# Patient Record
Sex: FEMALE | Race: WHITE | NOT HISPANIC OR LATINO | Employment: OTHER | URBAN - NONMETROPOLITAN AREA
[De-identification: names, ages, dates, MRNs, and addresses within clinical notes are randomized per-mention and may not be internally consistent; named-entity substitution may affect disease eponyms.]

---

## 2018-06-10 ENCOUNTER — APPOINTMENT (OUTPATIENT)
Dept: CT IMAGING | Facility: HOSPITAL | Age: 68
End: 2018-06-10

## 2018-06-10 ENCOUNTER — APPOINTMENT (OUTPATIENT)
Dept: GENERAL RADIOLOGY | Facility: HOSPITAL | Age: 68
End: 2018-06-10

## 2018-06-10 ENCOUNTER — HOSPITAL ENCOUNTER (EMERGENCY)
Facility: HOSPITAL | Age: 68
Discharge: ANOTHER HOSPITAL | End: 2018-06-10
Attending: EMERGENCY MEDICINE | Admitting: EMERGENCY MEDICINE

## 2018-06-10 VITALS
DIASTOLIC BLOOD PRESSURE: 58 MMHG | HEART RATE: 99 BPM | RESPIRATION RATE: 18 BRPM | WEIGHT: 110 LBS | OXYGEN SATURATION: 97 % | SYSTOLIC BLOOD PRESSURE: 111 MMHG | HEIGHT: 60 IN | TEMPERATURE: 97.6 F | BODY MASS INDEX: 21.6 KG/M2

## 2018-06-10 DIAGNOSIS — A41.9 SEPSIS, DUE TO UNSPECIFIED ORGANISM: ICD-10-CM

## 2018-06-10 DIAGNOSIS — T81.43XA POSTPROCEDURAL INTRAABDOMINAL ABSCESS: Primary | ICD-10-CM

## 2018-06-10 DIAGNOSIS — IMO0001 POSTOPERATIVE WOUND ABSCESS, INITIAL ENCOUNTER: ICD-10-CM

## 2018-06-10 DIAGNOSIS — D64.9 ANEMIA, UNSPECIFIED TYPE: ICD-10-CM

## 2018-06-10 LAB
ABO GROUP BLD: NORMAL
ABO GROUP BLD: NORMAL
ALBUMIN SERPL-MCNC: 3.9 G/DL (ref 3.5–5)
ALBUMIN/GLOB SERPL: 0.9 G/DL (ref 1–2)
ALP SERPL-CCNC: 772 U/L (ref 38–126)
ALT SERPL W P-5'-P-CCNC: 49 U/L (ref 13–69)
ANION GAP SERPL CALCULATED.3IONS-SCNC: 15.2 MMOL/L (ref 10–20)
AST SERPL-CCNC: 63 U/L (ref 15–46)
BASOPHILS # BLD AUTO: 0.05 10*3/MM3 (ref 0–0.2)
BASOPHILS NFR BLD AUTO: 0.3 % (ref 0–2.5)
BILIRUB SERPL-MCNC: 0.6 MG/DL (ref 0.2–1.3)
BLD GP AB SCN SERPL QL: NEGATIVE
BUN BLD-MCNC: 18 MG/DL (ref 7–20)
BUN/CREAT SERPL: 36 (ref 7.1–23.5)
CALCIUM SPEC-SCNC: 10 MG/DL (ref 8.4–10.2)
CHLORIDE SERPL-SCNC: 100 MMOL/L (ref 98–107)
CO2 SERPL-SCNC: 24 MMOL/L (ref 26–30)
CREAT BLD-MCNC: 0.5 MG/DL (ref 0.6–1.3)
D-LACTATE SERPL-SCNC: 1 MMOL/L (ref 0.5–2)
DEPRECATED RDW RBC AUTO: 58.2 FL (ref 37–54)
EOSINOPHIL # BLD AUTO: 0.45 10*3/MM3 (ref 0–0.7)
EOSINOPHIL NFR BLD AUTO: 2.7 % (ref 0–7)
ERYTHROCYTE [DISTWIDTH] IN BLOOD BY AUTOMATED COUNT: 19.5 % (ref 11.5–14.5)
GFR SERPL CREATININE-BSD FRML MDRD: 123 ML/MIN/1.73
GLOBULIN UR ELPH-MCNC: 4.2 GM/DL
GLUCOSE BLD-MCNC: 92 MG/DL (ref 74–98)
HCT VFR BLD AUTO: 23.4 % (ref 37–47)
HEMOCCULT STL QL: NEGATIVE
HGB BLD-MCNC: 7.7 G/DL (ref 12–16)
HOLD SPECIMEN: NORMAL
HOLD SPECIMEN: NORMAL
IMM GRANULOCYTES # BLD: 0.12 10*3/MM3 (ref 0–0.06)
IMM GRANULOCYTES NFR BLD: 0.7 % (ref 0–0.6)
INR PPP: 1.34 (ref 0.9–1.1)
LYMPHOCYTES # BLD AUTO: 1.96 10*3/MM3 (ref 0.6–3.4)
LYMPHOCYTES NFR BLD AUTO: 11.9 % (ref 10–50)
MAGNESIUM SERPL-MCNC: 2 MG/DL (ref 1.6–2.3)
MCH RBC QN AUTO: 27.1 PG (ref 27–31)
MCHC RBC AUTO-ENTMCNC: 32.9 G/DL (ref 30–37)
MCV RBC AUTO: 82.4 FL (ref 81–99)
MONOCYTES # BLD AUTO: 1.33 10*3/MM3 (ref 0–0.9)
MONOCYTES NFR BLD AUTO: 8.1 % (ref 0–12)
NEUTROPHILS # BLD AUTO: 12.56 10*3/MM3 (ref 2–6.9)
NEUTROPHILS NFR BLD AUTO: 76.3 % (ref 37–80)
NRBC BLD MANUAL-RTO: 0 /100 WBC (ref 0–0)
PLATELET # BLD AUTO: 765 10*3/MM3 (ref 130–400)
PMV BLD AUTO: 9.4 FL (ref 6–12)
POTASSIUM BLD-SCNC: 4.2 MMOL/L (ref 3.5–5.1)
PROT SERPL-MCNC: 8.1 G/DL (ref 6.3–8.2)
PROTHROMBIN TIME: 14.9 SECONDS (ref 9.3–12.1)
RBC # BLD AUTO: 2.84 10*6/MM3 (ref 4.2–5.4)
RH BLD: POSITIVE
RH BLD: POSITIVE
SODIUM BLD-SCNC: 135 MMOL/L (ref 137–145)
T&S EXPIRATION DATE: NORMAL
TROPONIN I SERPL-MCNC: <0.012 NG/ML (ref 0–0.03)
WBC NRBC COR # BLD: 16.47 10*3/MM3 (ref 4.8–10.8)
WHOLE BLOOD HOLD SPECIMEN: NORMAL
WHOLE BLOOD HOLD SPECIMEN: NORMAL

## 2018-06-10 PROCEDURE — 86900 BLOOD TYPING SEROLOGIC ABO: CPT

## 2018-06-10 PROCEDURE — 80053 COMPREHEN METABOLIC PANEL: CPT | Performed by: EMERGENCY MEDICINE

## 2018-06-10 PROCEDURE — 83605 ASSAY OF LACTIC ACID: CPT | Performed by: PHYSICIAN ASSISTANT

## 2018-06-10 PROCEDURE — 71045 X-RAY EXAM CHEST 1 VIEW: CPT

## 2018-06-10 PROCEDURE — 93005 ELECTROCARDIOGRAM TRACING: CPT | Performed by: EMERGENCY MEDICINE

## 2018-06-10 PROCEDURE — 96367 TX/PROPH/DG ADDL SEQ IV INF: CPT

## 2018-06-10 PROCEDURE — 84484 ASSAY OF TROPONIN QUANT: CPT | Performed by: EMERGENCY MEDICINE

## 2018-06-10 PROCEDURE — 25010000002 PIPERACILLIN SOD-TAZOBACTAM PER 1 G: Performed by: PHYSICIAN ASSISTANT

## 2018-06-10 PROCEDURE — 99285 EMERGENCY DEPT VISIT HI MDM: CPT

## 2018-06-10 PROCEDURE — 87040 BLOOD CULTURE FOR BACTERIA: CPT | Performed by: EMERGENCY MEDICINE

## 2018-06-10 PROCEDURE — 85025 COMPLETE CBC W/AUTO DIFF WBC: CPT | Performed by: EMERGENCY MEDICINE

## 2018-06-10 PROCEDURE — 87070 CULTURE OTHR SPECIMN AEROBIC: CPT | Performed by: EMERGENCY MEDICINE

## 2018-06-10 PROCEDURE — 83735 ASSAY OF MAGNESIUM: CPT | Performed by: EMERGENCY MEDICINE

## 2018-06-10 PROCEDURE — 87147 CULTURE TYPE IMMUNOLOGIC: CPT | Performed by: EMERGENCY MEDICINE

## 2018-06-10 PROCEDURE — 86900 BLOOD TYPING SEROLOGIC ABO: CPT | Performed by: PHYSICIAN ASSISTANT

## 2018-06-10 PROCEDURE — 86901 BLOOD TYPING SEROLOGIC RH(D): CPT

## 2018-06-10 PROCEDURE — 87186 SC STD MICRODIL/AGAR DIL: CPT | Performed by: EMERGENCY MEDICINE

## 2018-06-10 PROCEDURE — 85610 PROTHROMBIN TIME: CPT | Performed by: PHYSICIAN ASSISTANT

## 2018-06-10 PROCEDURE — 74176 CT ABD & PELVIS W/O CONTRAST: CPT

## 2018-06-10 PROCEDURE — 87077 CULTURE AEROBIC IDENTIFY: CPT | Performed by: EMERGENCY MEDICINE

## 2018-06-10 PROCEDURE — 82962 GLUCOSE BLOOD TEST: CPT

## 2018-06-10 PROCEDURE — 82272 OCCULT BLD FECES 1-3 TESTS: CPT | Performed by: EMERGENCY MEDICINE

## 2018-06-10 PROCEDURE — 25010000002 VANCOMYCIN PER 500 MG: Performed by: PHYSICIAN ASSISTANT

## 2018-06-10 PROCEDURE — 86901 BLOOD TYPING SEROLOGIC RH(D): CPT | Performed by: PHYSICIAN ASSISTANT

## 2018-06-10 PROCEDURE — 96361 HYDRATE IV INFUSION ADD-ON: CPT

## 2018-06-10 PROCEDURE — 96365 THER/PROPH/DIAG IV INF INIT: CPT

## 2018-06-10 PROCEDURE — 86850 RBC ANTIBODY SCREEN: CPT | Performed by: PHYSICIAN ASSISTANT

## 2018-06-10 RX ORDER — BUPROPION HYDROCHLORIDE 100 MG/1
200 TABLET, EXTENDED RELEASE ORAL 2 TIMES DAILY
COMMUNITY

## 2018-06-10 RX ORDER — MELATONIN
2000 DAILY
COMMUNITY

## 2018-06-10 RX ORDER — OXYCODONE HYDROCHLORIDE AND ACETAMINOPHEN 5; 325 MG/1; MG/1
1 TABLET ORAL EVERY 4 HOURS PRN
COMMUNITY

## 2018-06-10 RX ORDER — SUMATRIPTAN 25 MG/1
25 TABLET, FILM COATED ORAL ONCE AS NEEDED
COMMUNITY

## 2018-06-10 RX ORDER — LEVALBUTEROL INHALATION SOLUTION 1.25 MG/3ML
1 SOLUTION RESPIRATORY (INHALATION) EVERY 6 HOURS PRN
COMMUNITY

## 2018-06-10 RX ORDER — MONTELUKAST SODIUM 10 MG/1
10 TABLET ORAL NIGHTLY
COMMUNITY

## 2018-06-10 RX ORDER — ALBUTEROL SULFATE 90 UG/1
2 AEROSOL, METERED RESPIRATORY (INHALATION) EVERY 4 HOURS PRN
COMMUNITY

## 2018-06-10 RX ORDER — ATORVASTATIN CALCIUM 10 MG/1
10 TABLET, FILM COATED ORAL DAILY
COMMUNITY

## 2018-06-10 RX ORDER — DULOXETIN HYDROCHLORIDE 60 MG/1
60 CAPSULE, DELAYED RELEASE ORAL DAILY
COMMUNITY

## 2018-06-10 RX ORDER — LEVOTHYROXINE SODIUM 0.1 MG/1
100 TABLET ORAL DAILY
COMMUNITY

## 2018-06-10 RX ORDER — CIPROFLOXACIN 500 MG/1
500 TABLET, FILM COATED ORAL 2 TIMES DAILY
COMMUNITY

## 2018-06-10 RX ORDER — ROPINIROLE 1 MG/1
1 TABLET, FILM COATED ORAL NIGHTLY
COMMUNITY

## 2018-06-10 RX ORDER — PANTOPRAZOLE SODIUM 40 MG/1
40 TABLET, DELAYED RELEASE ORAL DAILY
COMMUNITY

## 2018-06-10 RX ORDER — ONDANSETRON 4 MG/1
4 TABLET, ORALLY DISINTEGRATING ORAL EVERY 8 HOURS PRN
COMMUNITY

## 2018-06-10 RX ORDER — AMLODIPINE BESYLATE 10 MG/1
10 TABLET ORAL DAILY
COMMUNITY

## 2018-06-10 RX ORDER — LANOLIN ALCOHOL/MO/W.PET/CERES
2000 CREAM (GRAM) TOPICAL DAILY
COMMUNITY

## 2018-06-10 RX ORDER — TEMAZEPAM 15 MG/1
15 CAPSULE ORAL NIGHTLY PRN
COMMUNITY

## 2018-06-10 RX ORDER — SODIUM CHLORIDE 0.9 % (FLUSH) 0.9 %
10 SYRINGE (ML) INJECTION AS NEEDED
Status: DISCONTINUED | OUTPATIENT
Start: 2018-06-10 | End: 2018-06-11 | Stop reason: HOSPADM

## 2018-06-10 RX ADMIN — SODIUM CHLORIDE 1000 ML: 9 INJECTION, SOLUTION INTRAVENOUS at 18:55

## 2018-06-10 RX ADMIN — SODIUM CHLORIDE 1500 ML: 9 INJECTION, SOLUTION INTRAVENOUS at 21:15

## 2018-06-10 RX ADMIN — TAZOBACTAM SODIUM AND PIPERACILLIN SODIUM 4.5 G: 500; 4 INJECTION, SOLUTION INTRAVENOUS at 20:14

## 2018-06-10 RX ADMIN — VANCOMYCIN HYDROCHLORIDE 1000 MG: 1 INJECTION, POWDER, LYOPHILIZED, FOR SOLUTION INTRAVENOUS at 20:53

## 2018-06-11 LAB — GLUCOSE BLDC GLUCOMTR-MCNC: 83 MG/DL (ref 70–130)

## 2018-06-13 LAB
BACTERIA SPEC AEROBE CULT: ABNORMAL
BACTERIA SPEC AEROBE CULT: ABNORMAL

## 2018-06-15 LAB
BACTERIA SPEC AEROBE CULT: NORMAL
BACTERIA SPEC AEROBE CULT: NORMAL

## 2018-07-12 PROCEDURE — 99283 EMERGENCY DEPT VISIT LOW MDM: CPT

## 2018-07-13 ENCOUNTER — HOSPITAL ENCOUNTER (EMERGENCY)
Facility: HOSPITAL | Age: 68
Discharge: HOME | End: 2018-07-13
Attending: EMERGENCY MEDICINE | Admitting: EMERGENCY MEDICINE

## 2018-07-13 VITALS
TEMPERATURE: 98 F | OXYGEN SATURATION: 100 % | BODY MASS INDEX: 21.05 KG/M2 | RESPIRATION RATE: 18 BRPM | HEART RATE: 77 BPM | DIASTOLIC BLOOD PRESSURE: 82 MMHG | HEIGHT: 60 IN | WEIGHT: 107.2 LBS | SYSTOLIC BLOOD PRESSURE: 128 MMHG

## 2018-07-13 DIAGNOSIS — T85.598A FEEDING TUBE DYSFUNCTION, INITIAL ENCOUNTER: Primary | ICD-10-CM

## 2018-07-14 ENCOUNTER — APPOINTMENT (OUTPATIENT)
Dept: GENERAL RADIOLOGY | Facility: HOSPITAL | Age: 68
End: 2018-07-14

## 2018-07-14 ENCOUNTER — HOSPITAL ENCOUNTER (EMERGENCY)
Facility: HOSPITAL | Age: 68
Discharge: HOME | End: 2018-07-14
Attending: EMERGENCY MEDICINE | Admitting: STUDENT IN AN ORGANIZED HEALTH CARE EDUCATION/TRAINING PROGRAM

## 2018-07-14 ENCOUNTER — APPOINTMENT (OUTPATIENT)
Dept: CT IMAGING | Facility: HOSPITAL | Age: 68
End: 2018-07-14

## 2018-07-14 VITALS
WEIGHT: 104 LBS | TEMPERATURE: 98.2 F | HEART RATE: 93 BPM | RESPIRATION RATE: 18 BRPM | DIASTOLIC BLOOD PRESSURE: 63 MMHG | BODY MASS INDEX: 20.42 KG/M2 | HEIGHT: 60 IN | OXYGEN SATURATION: 100 % | SYSTOLIC BLOOD PRESSURE: 96 MMHG

## 2018-07-14 DIAGNOSIS — R07.81 PLEURITIC PAIN: Primary | ICD-10-CM

## 2018-07-14 LAB
ALBUMIN SERPL-MCNC: 3.5 G/DL (ref 3.5–5)
ALBUMIN/GLOB SERPL: 0.8 G/DL (ref 1–2)
ALP SERPL-CCNC: 126 U/L (ref 38–126)
ALT SERPL W P-5'-P-CCNC: 20 U/L (ref 13–69)
ANION GAP SERPL CALCULATED.3IONS-SCNC: 15.8 MMOL/L (ref 10–20)
AST SERPL-CCNC: 46 U/L (ref 15–46)
BASOPHILS # BLD AUTO: 0.05 10*3/MM3 (ref 0–0.2)
BASOPHILS NFR BLD AUTO: 0.4 % (ref 0–2.5)
BILIRUB SERPL-MCNC: 0.5 MG/DL (ref 0.2–1.3)
BUN BLD-MCNC: 12 MG/DL (ref 7–20)
BUN/CREAT SERPL: 24 (ref 7.1–23.5)
CALCIUM SPEC-SCNC: 9.4 MG/DL (ref 8.4–10.2)
CHLORIDE SERPL-SCNC: 103 MMOL/L (ref 98–107)
CO2 SERPL-SCNC: 19 MMOL/L (ref 26–30)
CREAT BLD-MCNC: 0.5 MG/DL (ref 0.6–1.3)
DEPRECATED RDW RBC AUTO: 50.4 FL (ref 37–54)
EOSINOPHIL # BLD AUTO: 0.34 10*3/MM3 (ref 0–0.7)
EOSINOPHIL NFR BLD AUTO: 2.8 % (ref 0–7)
ERYTHROCYTE [DISTWIDTH] IN BLOOD BY AUTOMATED COUNT: 16.7 % (ref 11.5–14.5)
GFR SERPL CREATININE-BSD FRML MDRD: 123 ML/MIN/1.73
GLOBULIN UR ELPH-MCNC: 4.3 GM/DL
GLUCOSE BLD-MCNC: 104 MG/DL (ref 74–98)
HCT VFR BLD AUTO: 31.9 % (ref 37–47)
HGB BLD-MCNC: 10.3 G/DL (ref 12–16)
HOLD SPECIMEN: NORMAL
HOLD SPECIMEN: NORMAL
IMM GRANULOCYTES # BLD: 0.04 10*3/MM3 (ref 0–0.06)
IMM GRANULOCYTES NFR BLD: 0.3 % (ref 0–0.6)
LYMPHOCYTES # BLD AUTO: 1.37 10*3/MM3 (ref 0.6–3.4)
LYMPHOCYTES NFR BLD AUTO: 11.3 % (ref 10–50)
MCH RBC QN AUTO: 26.8 PG (ref 27–31)
MCHC RBC AUTO-ENTMCNC: 32.3 G/DL (ref 30–37)
MCV RBC AUTO: 83.1 FL (ref 81–99)
MONOCYTES # BLD AUTO: 0.53 10*3/MM3 (ref 0–0.9)
MONOCYTES NFR BLD AUTO: 4.4 % (ref 0–12)
NEUTROPHILS # BLD AUTO: 9.83 10*3/MM3 (ref 2–6.9)
NEUTROPHILS NFR BLD AUTO: 80.8 % (ref 37–80)
NRBC BLD MANUAL-RTO: 0 /100 WBC (ref 0–0)
PLATELET # BLD AUTO: 410 10*3/MM3 (ref 130–400)
PMV BLD AUTO: 9.3 FL (ref 6–12)
POTASSIUM BLD-SCNC: 3.8 MMOL/L (ref 3.5–5.1)
PROT SERPL-MCNC: 7.8 G/DL (ref 6.3–8.2)
RBC # BLD AUTO: 3.84 10*6/MM3 (ref 4.2–5.4)
SODIUM BLD-SCNC: 134 MMOL/L (ref 137–145)
TROPONIN I SERPL-MCNC: <0.012 NG/ML (ref 0–0.03)
WBC NRBC COR # BLD: 12.16 10*3/MM3 (ref 4.8–10.8)
WHOLE BLOOD HOLD SPECIMEN: NORMAL
WHOLE BLOOD HOLD SPECIMEN: NORMAL

## 2018-07-14 PROCEDURE — 96374 THER/PROPH/DIAG INJ IV PUSH: CPT

## 2018-07-14 PROCEDURE — 96361 HYDRATE IV INFUSION ADD-ON: CPT

## 2018-07-14 PROCEDURE — 25010000002 MORPHINE PER 10 MG: Performed by: STUDENT IN AN ORGANIZED HEALTH CARE EDUCATION/TRAINING PROGRAM

## 2018-07-14 PROCEDURE — 93005 ELECTROCARDIOGRAM TRACING: CPT | Performed by: EMERGENCY MEDICINE

## 2018-07-14 PROCEDURE — 80053 COMPREHEN METABOLIC PANEL: CPT | Performed by: EMERGENCY MEDICINE

## 2018-07-14 PROCEDURE — 25010000002 IOPAMIDOL 61 % SOLUTION: Performed by: STUDENT IN AN ORGANIZED HEALTH CARE EDUCATION/TRAINING PROGRAM

## 2018-07-14 PROCEDURE — 71045 X-RAY EXAM CHEST 1 VIEW: CPT

## 2018-07-14 PROCEDURE — 87040 BLOOD CULTURE FOR BACTERIA: CPT | Performed by: PHYSICIAN ASSISTANT

## 2018-07-14 PROCEDURE — 85025 COMPLETE CBC W/AUTO DIFF WBC: CPT | Performed by: EMERGENCY MEDICINE

## 2018-07-14 PROCEDURE — 99284 EMERGENCY DEPT VISIT MOD MDM: CPT

## 2018-07-14 PROCEDURE — 84484 ASSAY OF TROPONIN QUANT: CPT | Performed by: EMERGENCY MEDICINE

## 2018-07-14 PROCEDURE — 71275 CT ANGIOGRAPHY CHEST: CPT

## 2018-07-14 RX ORDER — SODIUM CHLORIDE 0.9 % (FLUSH) 0.9 %
10 SYRINGE (ML) INJECTION AS NEEDED
Status: DISCONTINUED | OUTPATIENT
Start: 2018-07-14 | End: 2018-07-15 | Stop reason: HOSPADM

## 2018-07-14 RX ORDER — MORPHINE SULFATE 4 MG/ML
4 INJECTION, SOLUTION INTRAMUSCULAR; INTRAVENOUS ONCE
Status: COMPLETED | OUTPATIENT
Start: 2018-07-14 | End: 2018-07-14

## 2018-07-14 RX ORDER — HYDROCODONE BITARTRATE AND ACETAMINOPHEN 5; 325 MG/1; MG/1
1 TABLET ORAL EVERY 8 HOURS PRN
Qty: 6 TABLET | Refills: 0 | Status: SHIPPED | OUTPATIENT
Start: 2018-07-14

## 2018-07-14 RX ORDER — ASPIRIN 325 MG
325 TABLET ORAL ONCE
Status: COMPLETED | OUTPATIENT
Start: 2018-07-14 | End: 2018-07-14

## 2018-07-14 RX ADMIN — MORPHINE SULFATE 4 MG: 4 INJECTION, SOLUTION INTRAMUSCULAR; INTRAVENOUS at 19:54

## 2018-07-14 RX ADMIN — IOPAMIDOL 100 ML: 612 INJECTION, SOLUTION INTRAVENOUS at 20:48

## 2018-07-14 RX ADMIN — SODIUM CHLORIDE 1000 ML: 9 INJECTION, SOLUTION INTRAVENOUS at 19:54

## 2018-07-14 RX ADMIN — ASPIRIN 325 MG ORAL TABLET 325 MG: 325 PILL ORAL at 19:54

## 2018-07-19 LAB — BACTERIA SPEC AEROBE CULT: NORMAL

## 2019-04-19 ENCOUNTER — HOSPITAL ENCOUNTER (EMERGENCY)
Age: 69
Discharge: HOME | End: 2019-04-19
Attending: EMERGENCY MEDICINE
Payer: MEDICARE

## 2019-04-19 ENCOUNTER — APPOINTMENT (OUTPATIENT)
Dept: GENERAL RADIOLOGY | Age: 69
End: 2019-04-19
Attending: EMERGENCY MEDICINE
Payer: MEDICARE

## 2019-04-19 VITALS
SYSTOLIC BLOOD PRESSURE: 103 MMHG | HEART RATE: 80 BPM | WEIGHT: 99 LBS | RESPIRATION RATE: 20 BRPM | OXYGEN SATURATION: 98 % | HEIGHT: 60 IN | TEMPERATURE: 98.4 F | DIASTOLIC BLOOD PRESSURE: 85 MMHG | BODY MASS INDEX: 19.44 KG/M2

## 2019-04-19 DIAGNOSIS — J44.1 COPD EXACERBATION (HCC): Primary | ICD-10-CM

## 2019-04-19 LAB
ALBUMIN SERPL-MCNC: 3 G/DL (ref 3.4–5)
ALBUMIN/GLOB SERPL: 0.9 {RATIO} (ref 0.8–1.7)
ALP SERPL-CCNC: 146 U/L (ref 45–117)
ALT SERPL-CCNC: 17 U/L (ref 13–56)
ANION GAP SERPL CALC-SCNC: 6 MMOL/L (ref 3–18)
AST SERPL-CCNC: 24 U/L (ref 15–37)
BASOPHILS # BLD: 0.1 K/UL (ref 0–0.1)
BASOPHILS NFR BLD: 1 % (ref 0–2)
BILIRUB SERPL-MCNC: 0.3 MG/DL (ref 0.2–1)
BNP SERPL-MCNC: 238 PG/ML (ref 0–900)
BUN SERPL-MCNC: 15 MG/DL (ref 7–18)
BUN/CREAT SERPL: 24 (ref 12–20)
CALCIUM SERPL-MCNC: 8.9 MG/DL (ref 8.5–10.1)
CHLORIDE SERPL-SCNC: 109 MMOL/L (ref 100–108)
CK MB CFR SERPL CALC: ABNORMAL % (ref 0–4)
CK MB SERPL-MCNC: <1 NG/ML (ref 5–25)
CK SERPL-CCNC: 25 U/L (ref 26–192)
CO2 SERPL-SCNC: 26 MMOL/L (ref 21–32)
CREAT SERPL-MCNC: 0.62 MG/DL (ref 0.6–1.3)
D DIMER PPP FEU-MCNC: 0.71 UG/ML(FEU)
DIFFERENTIAL METHOD BLD: ABNORMAL
EOSINOPHIL # BLD: 0.6 K/UL (ref 0–0.4)
EOSINOPHIL NFR BLD: 6 % (ref 0–5)
ERYTHROCYTE [DISTWIDTH] IN BLOOD BY AUTOMATED COUNT: 15.4 % (ref 11.6–14.5)
GLOBULIN SER CALC-MCNC: 3.5 G/DL (ref 2–4)
GLUCOSE SERPL-MCNC: 91 MG/DL (ref 74–99)
HCT VFR BLD AUTO: 35.2 % (ref 35–45)
HGB BLD-MCNC: 11.2 G/DL (ref 12–16)
LYMPHOCYTES # BLD: 3 K/UL (ref 0.9–3.6)
LYMPHOCYTES NFR BLD: 33 % (ref 21–52)
MAGNESIUM SERPL-MCNC: 2.2 MG/DL (ref 1.6–2.6)
MCH RBC QN AUTO: 28 PG (ref 24–34)
MCHC RBC AUTO-ENTMCNC: 31.8 G/DL (ref 31–37)
MCV RBC AUTO: 88 FL (ref 74–97)
MONOCYTES # BLD: 0.7 K/UL (ref 0.05–1.2)
MONOCYTES NFR BLD: 7 % (ref 3–10)
NEUTS SEG # BLD: 4.7 K/UL (ref 1.8–8)
NEUTS SEG NFR BLD: 53 % (ref 40–73)
PLATELET # BLD AUTO: 305 K/UL (ref 135–420)
PMV BLD AUTO: 9.2 FL (ref 9.2–11.8)
POTASSIUM SERPL-SCNC: 4.1 MMOL/L (ref 3.5–5.5)
PROT SERPL-MCNC: 6.5 G/DL (ref 6.4–8.2)
RBC # BLD AUTO: 4 M/UL (ref 4.2–5.3)
SODIUM SERPL-SCNC: 141 MMOL/L (ref 136–145)
TROPONIN I SERPL-MCNC: <0.02 NG/ML (ref 0–0.04)
WBC # BLD AUTO: 9 K/UL (ref 4.6–13.2)

## 2019-04-19 PROCEDURE — 71045 X-RAY EXAM CHEST 1 VIEW: CPT

## 2019-04-19 PROCEDURE — 96374 THER/PROPH/DIAG INJ IV PUSH: CPT

## 2019-04-19 PROCEDURE — 83880 ASSAY OF NATRIURETIC PEPTIDE: CPT

## 2019-04-19 PROCEDURE — 77030013140 HC MSK NEB VYRM -A

## 2019-04-19 PROCEDURE — 99284 EMERGENCY DEPT VISIT MOD MDM: CPT

## 2019-04-19 PROCEDURE — 80053 COMPREHEN METABOLIC PANEL: CPT

## 2019-04-19 PROCEDURE — 93005 ELECTROCARDIOGRAM TRACING: CPT

## 2019-04-19 PROCEDURE — 94640 AIRWAY INHALATION TREATMENT: CPT

## 2019-04-19 PROCEDURE — 85379 FIBRIN DEGRADATION QUANT: CPT

## 2019-04-19 PROCEDURE — 83735 ASSAY OF MAGNESIUM: CPT

## 2019-04-19 PROCEDURE — 82550 ASSAY OF CK (CPK): CPT

## 2019-04-19 PROCEDURE — 74011250636 HC RX REV CODE- 250/636: Performed by: EMERGENCY MEDICINE

## 2019-04-19 PROCEDURE — 74011000250 HC RX REV CODE- 250: Performed by: EMERGENCY MEDICINE

## 2019-04-19 PROCEDURE — 85025 COMPLETE CBC W/AUTO DIFF WBC: CPT

## 2019-04-19 RX ORDER — IPRATROPIUM BROMIDE AND ALBUTEROL SULFATE 2.5; .5 MG/3ML; MG/3ML
3 SOLUTION RESPIRATORY (INHALATION)
Status: COMPLETED | OUTPATIENT
Start: 2019-04-19 | End: 2019-04-19

## 2019-04-19 RX ORDER — DULOXETIN HYDROCHLORIDE 20 MG/1
60 CAPSULE, DELAYED RELEASE ORAL DAILY
COMMUNITY

## 2019-04-19 RX ORDER — LOPERAMIDE HYDROCHLORIDE 2 MG/1
2 CAPSULE ORAL
COMMUNITY

## 2019-04-19 RX ORDER — IPRATROPIUM BROMIDE AND ALBUTEROL SULFATE 2.5; .5 MG/3ML; MG/3ML
3 SOLUTION RESPIRATORY (INHALATION)
COMMUNITY

## 2019-04-19 RX ORDER — PREDNISONE 20 MG/1
60 TABLET ORAL DAILY
Qty: 15 TAB | Refills: 0 | Status: SHIPPED | OUTPATIENT
Start: 2019-04-19 | End: 2019-04-24

## 2019-04-19 RX ORDER — BUPROPION HYDROCHLORIDE 150 MG/1
300 TABLET ORAL
COMMUNITY

## 2019-04-19 RX ORDER — ALBUTEROL SULFATE 90 UG/1
AEROSOL, METERED RESPIRATORY (INHALATION)
COMMUNITY
End: 2019-04-19

## 2019-04-19 RX ORDER — ALBUTEROL SULFATE 90 UG/1
1 AEROSOL, METERED RESPIRATORY (INHALATION)
Qty: 1 INHALER | Refills: 1 | Status: SHIPPED | OUTPATIENT
Start: 2019-04-19

## 2019-04-19 RX ADMIN — IPRATROPIUM BROMIDE AND ALBUTEROL SULFATE 3 ML: .5; 3 SOLUTION RESPIRATORY (INHALATION) at 19:18

## 2019-04-19 RX ADMIN — METHYLPREDNISOLONE SODIUM SUCCINATE 125 MG: 125 INJECTION, POWDER, FOR SOLUTION INTRAMUSCULAR; INTRAVENOUS at 19:50

## 2019-04-19 RX ADMIN — IPRATROPIUM BROMIDE AND ALBUTEROL SULFATE 3 ML: .5; 3 SOLUTION RESPIRATORY (INHALATION) at 19:17

## 2019-04-19 NOTE — ED PROVIDER NOTES
EMERGENCY DEPARTMENT HISTORY AND PHYSICAL EXAM 
 
Date: 4/19/2019 Patient Name: Saul Yañez History of Presenting Illness Chief Complaint Patient presents with  Shortness of Breath History Provided By: Patient Additional History (Context):  
Saul Yañez is a 76 y.o. female with PMHX non-oxygen dependent COPD/emphysema presents to the emergency department C/O shortness of breath that started this morning. Patient states that she used to have her DuoNeb's however reports minimal improvement. Patient denies chest pain however she states that every time she takes a deep breath she feels \"tight\". Pt denies recent illness including fever, chills, nausea or vomiting, and any other sxs or complaints. Reports she just moved here from [place] 6 weeks ago. Denies any history of DVT or PE. 
 
PCP: None Current Outpatient Medications Medication Sig Dispense Refill  lipase-protease-amylase (CREON) 12,000-38,000 -60,000 unit capsule Take  by mouth three (3) times daily (with meals).  loperamide (IMODIUM) 2 mg capsule Take  by mouth.  buPROPion XL (WELLBUTRIN XL) 150 mg tablet Take 150 mg by mouth every morning.  DULoxetine (CYMBALTA) 20 mg capsule Take 20 mg by mouth daily.  Cetirizine 10 mg cap Take  by mouth.  albuterol-ipratropium (DUO-NEB) 2.5 mg-0.5 mg/3 ml nebu 3 mL by Nebulization route.  albuterol (PROVENTIL HFA, VENTOLIN HFA, PROAIR HFA) 90 mcg/actuation inhaler Take 1 Puff by inhalation every four (4) hours as needed for Wheezing. 1 Inhaler 1  
 HYDROcodone-acetaminophen (NORCO) 5-325 mg per tablet Take 1 Tab by mouth every four (4) hours as needed for Pain.  TEMAZEPAM PO Take  by mouth as needed.  ropinirole HCl (REQUIP PO) Take  by mouth as needed.  levothyroxine (SYNTHROID) 112 mcg tablet Take 112 mcg by mouth Daily (before breakfast).     
 nystatin-triamcinolone (MYCOLOG II) topical cream Apply  to affected area three (3) times daily. 30 g 2 Past History Past Medical History: 
Past Medical History:  
Diagnosis Date  Asthma  Depression  Emphysema of lung ([address]  Gastroparesis  Hypothyroidism  Malabsorption   
 of fat  Pancreatic insufficiency  Restless leg   
 bilateral  
 
 
Past Surgical History: 
Past Surgical History:  
Procedure Laterality Date [address] UNLISTED  HX HERNIA REPAIR    
 HX SMALL BOWEL RESECTION    
 intra aortic baloon pump  HX TUBAL LIGATION Family History: 
History reviewed. No pertinent family history. Social History: 
Social History Tobacco Use  Smoking status: Former Smoker  Smokeless tobacco: Never Used Substance Use Topics  Alcohol use: Never Frequency: Never  Drug use: Not Currently Allergies: 
No Known Allergies Review of Systems Review of Systems Constitutional: Negative for chills and fever. HENT: Negative for congestion, ear pain, sinus pain and sore throat. Eyes: Negative for pain and visual disturbance. Respiratory: Positive for chest tightness and shortness of breath. Negative for cough. Cardiovascular: Negative for chest pain and leg swelling. Gastrointestinal: Negative for abdominal pain, constipation, diarrhea, nausea and vomiting. Genitourinary: Negative for dysuria, hematuria, vaginal bleeding and vaginal discharge. Musculoskeletal: Negative for back pain and neck pain. Skin: Negative for pallor and rash. Neurological: Negative for dizziness, tremors, weakness, light-headedness and headaches. All other systems reviewed and are negative. Physical Exam  
 
Vitals:  
 04/19/19 1843 04/19/19 1921 BP: 103/85 Pulse: 80 Resp: 20 Temp: 98.4 °F (36.9 °C) SpO2: 98% 98% Weight: 44.9 kg (99 lb) Height: 5' (1.524 m) Physical Exam 
 
Nursing note and vitals reviewed Constitutional: Elderly  female, no acute distress Head: Normocephalic, Atraumatic Eyes: Pupils are equal, round, and reactive to light, EOMI Neck: Supple, non-tender Cardiovascular: Regular rate and rhythm, no murmurs, rubs, or gallops Chest: Normal work of breathing and chest excursion bilaterally Lungs: Clear to ausculation bilaterally, no wheezes, no rhonchi Abdomen: Soft, non tender, non distended, normoactive bowel sounds Back: No evidence of trauma or deformity Extremities: No evidence of trauma or deformity, no LE edema Skin: Warm and dry, normal cap refill Neuro: Alert and appropriate, CN intact, normal speech, moving all 4 extremities freely and symmetrically Psychiatric: Normal mood and affect Diagnostic Study Results Labs - No results found for this or any previous visit (from the past 12 hour(s)). Radiologic Studies -  
XR CHEST PORT Final Result Impression: Hyperinflated lungs without acute process. CT Results  (Last 48 hours) None CXR Results  (Last 48 hours) None Medical Decision Making I am the first provider for this patient. I reviewed the vital signs, available nursing notes, past medical history, past surgical history, family history and social history. Vital Signs-Reviewed the patient's vital signs. Pulse Oximetry Analysis -98 % on room air Cardiac Monitor: 
Rate: 85 bpm 
Rhythm: Regular EKG interpretation: (Preliminary) 6:56 PM  
 
Records Reviewed: Nursing Notes and Old Medical Records Provider Notes:  
76 y.o. female past medical history of COPD/emphysema presenting with shortness of breath that started today. On exam patient does not appear to be in respiratory distress. Saturating 98% room room air. Lungs clear to auscultation with no wheezes auscultated. Will give duo nebs and Solu-Medrol and reassess her breathing. No prior history of DVT or PE. However, PE is my differential with her recent long distance move.   Will obtain d-dimer. Will also evaluate for ACS although lower on my differential. 
 
Procedures: 
Procedures ED Course:  
6:56 PM 
 Initial assessment performed. The patients presenting problems have been discussed, and they are in agreement with the care plan formulated and outlined with them. I have encouraged them to ask questions as they arise throughout their visit. BEDSIDE TRANSFER OF CARE: 
6:56 PM 
[facility]  [facility] Augusto Garcia MD.  Discussed available diagnostic results and care plan at length at beside. Patient and family made aware of provider change. All questions answered. Patient and family voiced understanding. Written by Augusto Garcia MD,  
 
9:15 PM 
Progress note Patient responded well to treatments medications results updated with patient. They feel comfortable going home with reasons to return discussed at length patient given prescription for medications including steroids encouraged to follow-up with primary care doctor Diagnosis and Disposition DISCHARGE NOTE: 
 
Myesha Barker's  results have been reviewed with her. She has been counseled regarding her diagnosis, treatment, and plan. She verbally conveys understanding and agreement of the signs, symptoms, diagnosis, treatment and prognosis and additionally agrees to follow up as discussed. She also agrees with the care-plan and conveys that all of her questions have been answered. I have also provided discharge instructions for her that include: educational information regarding their diagnosis and treatment, and list of reasons why they would want to return to the ED prior to their follow-up appointment, should her condition change. She has been provided with education for proper emergency department utilization. CLINICAL IMPRESSION: 
 
1. COPD exacerbation ([address] PLAN: 
1. D/C Home 2.   
Discharge Medication List as of 4/19/2019  9:38 PM  
  
 START taking these medications Details  
predniSONE (DELTASONE) 20 mg tablet Take 60 mg by mouth daily for 5 days. With Breakfast, Print, Disp-15 Tab, R-0  
  
  
CONTINUE these medications which have CHANGED Details  
albuterol (PROVENTIL HFA, VENTOLIN HFA, PROAIR HFA) 90 mcg/actuation inhaler Take 1 Puff by inhalation every four (4) hours as needed for Wheezing., Print, Disp-1 Inhaler, R-1  
  
  
CONTINUE these medications which have NOT CHANGED Details  
lipase-protease-amylase (CREON) 12,000-38,000 -60,000 unit capsule Take  by mouth three (3) times daily (with meals). , Historical Med  
  
loperamide (IMODIUM) 2 mg capsule Take  by mouth., Historical Med  
  
buPROPion XL (WELLBUTRIN XL) 150 mg tablet Take 150 mg by mouth every morning., Historical Med DULoxetine (CYMBALTA) 20 mg capsule Take 20 mg by mouth daily. , Historical Med Cetirizine 10 mg cap Take  by mouth., Historical Med  
  
albuterol-ipratropium (DUO-NEB) 2.5 mg-0.5 mg/3 ml nebu 3 mL by Nebulization route., Historical Med 3. Follow-up Information Follow up With Specialties Details Why Contact Info None  In 1 week  None (395) Patient stated that they have no PCP 
  
 [facility] EMERGENCY DE[facility] Emergency Medicine  As needed, If symptoms worsen [address] [address] 
[phone]  
  
 
____________________________________ Please note that this dictation was completed with Smartsheet, the [employer] voice recognition software. Quite often unanticipated grammatical, syntax, homophones, and other interpretive errors are inadvertently transcribed by the computer software. Please disregard these errors. Please excuse any errors that have escaped final proofreading.

## 2019-04-20 NOTE — DISCHARGE INSTRUCTIONS
Patient Education        Using a Metered-Dose Inhaler: Care Instructions  Your Care Instructions    A metered-dose inhaler lets you breathe medicine into your lungs quickly. Inhaled medicine works faster than the same medicine in a pill. An inhaler allows you to take less medicine than you would need if you took it as a pill. \"Metered-dose\" means that the inhaler gives a measured amount of medicine each time you use it. A metered-dose inhaler gives medicine in the form of a liquid mist.  Your doctor may want you to use a spacer with your inhaler. A spacer is a chamber that you attach to the inhaler. The chamber holds the medicine before you inhale it. That way, you can inhale the medicine in as many breaths as you need. Doctors recommend using a spacer with most metered-dose inhalers, especially those with corticosteroid medicines. Follow-up care is a key part of your treatment and safety. Be sure to make and go to all appointments, and call your doctor if you are having problems. It's also a good idea to know your test results and keep a list of the medicines you take. How can you care for yourself at home? To get started using your inhaler  · Talk with your health care provider to be sure you are using your inhaler the right way. It might help if you practice using it in front of a mirror. Use the inhaler exactly as prescribed. · Check that you have the correct medicine. If you use more than one inhaler, put a label on each one. This will let you know which one to use at the right time. · Keep track of how much medicine is in the inhaler. Check the label to see how many doses are in the container. If you know how many puffs you can take, you can replace the inhaler before you run out. Ask your health care provider how you can keep track of how much medicine is left. · Use a spacer if you have problems pressing the inhaler and breathing in at the same time.  You also may need a spacer if you are using corticosteroid medicines. · If you are using a corticosteroid inhaler, gargle and rinse out your mouth with water after use. Do not swallow the water. Swallowing the water will increase the chance that the medicine will get into your bloodstream. This may make it more likely that you will have side effects. To use a spacer with an inhaler  1. Shake the inhaler. Remove the inhaler cap, and place the mouthpiece of the inhaler into the spacer. Check the inhaler instructions to see if you need to prime your inhaler before you use it. If it needs priming, follow the instructions on how to prime your inhaler. 2. Remove the cap from the spacer. 3. Hold the inhaler upright with the mouthpiece at the bottom. 4. Tilt your head back a little, and breathe out slowly and completely. 5. Place the spacer's mouthpiece in your mouth. 6. Press down on the inhaler to spray one puff of medicine into the spacer, and then start breathing in slowly. Wait to inhale until after you have pressed down on the inhaler. Some spacers have a whistle. If you hear it, you should breathe in more slowly. 7. Hold your breath for 10 seconds. This will let the medicine settle in your lungs. 8. If you need to take a second dose, wait 30 to 60 seconds to allow the inhaler valve to refill. To use an inhaler without a spacer  1. Shake the inhaler as directed. Remove the cap. Check the instructions to see if you need to prime your inhaler before you use it. If it needs priming, follow the instructions on how to prime your inhaler. 2. Hold the inhaler upright with the mouthpiece at the bottom. 3. Tilt your head back a little, and breathe out slowly and completely. 4. Position the inhaler in one of two ways:  ? You can place the inhaler in your mouth. This is easier for most people. And it lowers the risk that any of the medicine will get into your eyes.   ? Or you can place the inhaler 1 to 2 inches in front of your open mouth, without closing your lips over it. Try to open your mouth as wide as you can. Placing the inhaler in front of your open mouth may be better for getting the medicine into your lungs. But some people may find this too hard to do. 5. Start taking slow, even breaths through your mouth. Press down on the inhaler once, then inhale fully. 6. Hold your breath for 10 seconds. This will let the medicine settle in your lungs. 7. If you need to take a second dose, wait 30 to 60 seconds to allow the inhaler valve to refill. Where can you learn more? Go to http://rocky-kenneth.info/. Enter K111 in the search box to learn more about \"Using a Metered-Dose Inhaler: Care Instructions. \"  Current as of: September 5, 2018  Content Version: 11.9  © 4222-2177 [employer]. Care instructions adapted under license by [employer] (which disclaims liability or warranty for this information). If you have questions about a medical condition or this instruction, always ask your healthcare professional. [place] [address] any warranty or liability for your use of this information. Patient Education        Chronic Obstructive Pulmonary Disease (COPD): Care Instructions  Your Care Instructions    Chronic obstructive pulmonary disease (COPD) is a general term for a group of lung diseases, including emphysema and chronic bronchitis. People with COPD have decreased airflow in and out of the lungs, which makes it hard to breathe. The airways also can get clogged with thick mucus. Cigarette smoking is a major cause of COPD. Although there is no cure for COPD, you can slow its progress. Following your treatment plan and taking care of yourself can help you feel better and live longer. Follow-up care is a key part of your treatment and safety. Be sure to make and go to all appointments, and call your doctor if you are having problems.  It's also a good idea to know your test results and keep a list of the medicines you take. How can you care for yourself at home?   Staying healthy    · Do not smoke. This is the most important step you can take to prevent more damage to your lungs. If you need help quitting, talk to your doctor about stop-smoking programs and medicines. These can increase your chances of quitting for good.     · Avoid colds and flu. Get a pneumococcal vaccine shot. If you have had one before, ask your doctor whether you need a second dose. Get the flu vaccine every fall. If you must be around people with colds or the flu, wash your hands often.     · Avoid secondhand smoke, air pollution, and high altitudes. Also avoid cold, dry air and hot, humid air. Stay at home with your windows closed when air pollution is bad.    Medicines and oxygen therapy    · Take your medicines exactly as prescribed. Call your doctor if you think you are having a problem with your medicine.     · You may be taking medicines such as:  ? Bronchodilators. These help open your airways and make breathing easier. Bronchodilators are either short-acting (work for 6 to 9 hours) or long-acting (work for 24 hours). You inhale most bronchodilators, so they start to act quickly. Always carry your quick-relief inhaler with you in case you need it while you are away from home. ? Corticosteroids (prednisone, budesonide). These reduce airway inflammation. They come in pill or inhaled form. You must take these medicines every day for them to work well.     · A spacer may help you get more inhaled medicine to your lungs. Ask your doctor or pharmacist if a spacer is right for you. If it is, ask how to use it properly.     · Do not take any vitamins, over-the-counter medicine, or herbal products without talking to your doctor first.     · If your doctor prescribed antibiotics, take them as directed. Do not stop taking them just because you feel better.  You need to take the full course of antibiotics.     · Oxygen therapy boosts the amount of oxygen in your blood and helps you breathe easier. Use the flow rate your doctor has recommended, and do not change it without talking to your doctor first.   Activity    · Get regular exercise. Walking is an easy way to get exercise. Start out slowly, and walk a little more each day.     · Pay attention to your breathing. You are exercising too hard if you cannot talk while you are exercising.     · Take short rest breaks when doing household chores and other activities.     · Learn breathing methods--such as breathing through pursed lips--to help you become less short of breath.     · If your doctor has not set you up with a pulmonary rehabilitation program, talk to him or her about whether rehab is right for you. Rehab includes exercise programs, education about your disease and how to manage it, help with diet and other changes, and emotional support. Diet    · Eat regular, healthy meals. Use bronchodilators about 1 hour before you eat to make it easier to eat. Eat several small meals instead of three large ones. Drink beverages at the end of the meal. Avoid foods that are hard to chew.     · Eat foods that contain protein so that you do not lose muscle mass.     · Talk with your doctor if you gain too much weight or if you lose weight without trying.    Mental health    · Talk to your family, friends, or a therapist about your feelings. It is normal to feel frightened, angry, hopeless, helpless, and even guilty. Talking openly about bad feelings can help you cope. If these feelings last, talk to your doctor. When should you call for help? Call 911 anytime you think you may need emergency care.  For example, call if:    · You have severe trouble breathing.    Call your doctor now or seek immediate medical care if:    · You have new or worse trouble breathing.     · You cough up blood.     · You have a fever.    Watch closely for changes in your health, and be sure to contact your doctor if:    · You cough more deeply or more often, especially if you notice more mucus or a change in the color of your mucus.     · You have new or worse swelling in your legs or belly.     · You are not getting better as expected. Where can you learn more? Go to http://rocky-kenneth.info/. Xochitl Short in the search box to learn more about \"Chronic Obstructive Pulmonary Disease (COPD): Care Instructions. \"  Current as of: September 5, 2018  Content Version: 11.9  © 0892-4656 [employer]. Care instructions adapted under license by [employer] (which disclaims liability or warranty for this information). If you have questions about a medical condition or this instruction, always ask your healthcare professional. [address] any warranty or liability for your use of this information. Patient Education        COPD Exacerbation Plan: Care Instructions  Your Care Instructions    If you have chronic obstructive pulmonary disease (COPD), your usual shortness of breath could suddenly get worse. You may start coughing more and have more mucus. This flare-up is called a COPD exacerbation (say \"[id number]-[id number]-[id number]\"). A lung infection or air pollution could set off an exacerbation. Sometimes it can happen after a quick change in temperature or being around chemicals. Work with your doctor to make a plan for dealing with an exacerbation. You can better manage it if you plan ahead. Follow-up care is a key part of your treatment and safety. Be sure to make and go to all appointments, and call your doctor if you are having problems. It's also a good idea to know your test results and keep a list of the medicines you take. How can you care for yourself at home? During an exacerbation  · Do not panic if you start to have one. Quick treatment at home may help you prevent serious breathing problems.  If you have a COPD exacerbation plan that you developed with your doctor, follow it.  · Take your medicines exactly as your doctor tells you.  ? Use your inhaler as directed by your doctor. If your symptoms do not get better after you use your medicine, have someone take you to the emergency room. Call an ambulance if necessary. ? With inhaled medicines, a spacer or a nebulizer may help you get more medicine to your lungs. Ask your doctor or pharmacist how to use them properly. Practice using the spacer in front of a mirror before you have an exacerbation. This may help you get the medicine into your lungs quickly. ? If your doctor has given you steroid pills, take them as directed. ? Your doctor may have given you a prescription for antibiotics, which you can fill if you need it. ? Talk to your doctor if you have any problems with your medicine. And call your doctor if you have to use your antibiotic or steroid pills. Preventing an exacerbation  · Do not smoke. This is the most important step you can take to prevent more damage to your lungs and prevent problems. If you already smoke, it is never too late to stop. If you need help quitting, talk to your doctor about stop-smoking programs and medicines. These can increase your chances of quitting for good. · Take your daily medicines as prescribed. · Avoid colds and flu. ? Get a pneumococcal vaccine. ? Get a flu vaccine each year, as soon as it is available. Ask those you live or work with to do the same, so they will not get the flu and infect you. ? Try to stay away from people with colds or the flu. ? Wash your hands often. · Avoid secondhand smoke; air pollution; cold, dry air; hot, humid air; and high altitudes. Stay at home with your windows closed when air pollution is bad. · Learn breathing techniques for COPD, such as breathing through pursed lips. These techniques can help you breathe easier during an exacerbation. When should you call for help? Call 911 anytime you think you may need emergency care.  For example, call if:    · You have severe trouble breathing.     · You have severe chest pain.    Call your doctor now or seek immediate medical care if:    · You have new or worse shortness of breath.     · You develop new chest pain.     · You are coughing more deeply or more often, especially if you notice more mucus or a change in the color of your mucus.     · You cough up blood.     · You have new or increased swelling in your legs or belly.     · You have a fever.    Watch closely for changes in your health, and be sure to contact your doctor if:    · You need to use your antibiotic or steroid pills.     · Your symptoms are getting worse. Where can you learn more? Go to http://rocky-kenneth.info/. Enter W157 in the search box to learn more about \"COPD Exacerbation Plan: Care Instructions. \"  Current as of: September 5, 2018  Content Version: 11.9  © 5301-7552 [employer]. Care instructions adapted under license by [employer] (which disclaims liability or warranty for this information). If you have questions about a medical condition or this instruction, always ask your healthcare professional. [address] any warranty or liability for your use of this information.

## 2019-04-21 ENCOUNTER — HOSPITAL ENCOUNTER (EMERGENCY)
Age: 69
Discharge: HOME | End: 2019-04-21
Attending: EMERGENCY MEDICINE | Admitting: EMERGENCY MEDICINE
Payer: MEDICARE

## 2019-04-21 ENCOUNTER — APPOINTMENT (OUTPATIENT)
Dept: CT IMAGING | Age: 69
End: 2019-04-21
Attending: PHYSICIAN ASSISTANT
Payer: MEDICARE

## 2019-04-21 VITALS
HEIGHT: 63 IN | SYSTOLIC BLOOD PRESSURE: 135 MMHG | HEART RATE: 76 BPM | TEMPERATURE: 98 F | BODY MASS INDEX: 17.54 KG/M2 | DIASTOLIC BLOOD PRESSURE: 80 MMHG | OXYGEN SATURATION: 99 % | WEIGHT: 99 LBS | RESPIRATION RATE: 16 BRPM

## 2019-04-21 DIAGNOSIS — Z76.0 MEDICATION REFILL: ICD-10-CM

## 2019-04-21 DIAGNOSIS — R10.84 ABDOMINAL PAIN, GENERALIZED: Primary | ICD-10-CM

## 2019-04-21 DIAGNOSIS — K92.89 FISTULA OF DIGESTIVE SYSTEM: ICD-10-CM

## 2019-04-21 DIAGNOSIS — L30.8 DERMATITIS ASSOCIATED WITH MOISTURE: ICD-10-CM

## 2019-04-21 LAB
ALBUMIN SERPL-MCNC: 2.8 G/DL (ref 3.4–5)
ALBUMIN/GLOB SERPL: 0.8 {RATIO} (ref 0.8–1.7)
ALP SERPL-CCNC: 128 U/L (ref 45–117)
ALT SERPL-CCNC: 19 U/L (ref 13–56)
ANION GAP SERPL CALC-SCNC: 9 MMOL/L (ref 3–18)
APPEARANCE UR: ABNORMAL
AST SERPL-CCNC: 18 U/L (ref 15–37)
BACTERIA URNS QL MICRO: ABNORMAL /HPF
BASOPHILS # BLD: 0 K/UL (ref 0–0.1)
BASOPHILS NFR BLD: 0 % (ref 0–2)
BILIRUB SERPL-MCNC: 0.2 MG/DL (ref 0.2–1)
BILIRUB UR QL: NEGATIVE
BUN SERPL-MCNC: 24 MG/DL (ref 7–18)
BUN/CREAT SERPL: 38 (ref 12–20)
CALCIUM SERPL-MCNC: 8.9 MG/DL (ref 8.5–10.1)
CAOX CRY URNS QL MICRO: ABNORMAL
CHLORIDE SERPL-SCNC: 110 MMOL/L (ref 100–108)
CO2 SERPL-SCNC: 23 MMOL/L (ref 21–32)
COLOR UR: YELLOW
CREAT SERPL-MCNC: 0.63 MG/DL (ref 0.6–1.3)
DIFFERENTIAL METHOD BLD: ABNORMAL
EOSINOPHIL # BLD: 0 K/UL (ref 0–0.4)
EOSINOPHIL NFR BLD: 0 % (ref 0–5)
EPITH CASTS URNS QL MICRO: ABNORMAL /LPF (ref 0–5)
ERYTHROCYTE [DISTWIDTH] IN BLOOD BY AUTOMATED COUNT: 15.9 % (ref 11.6–14.5)
GLOBULIN SER CALC-MCNC: 3.5 G/DL (ref 2–4)
GLUCOSE SERPL-MCNC: 129 MG/DL (ref 74–99)
GLUCOSE UR STRIP.AUTO-MCNC: NEGATIVE MG/DL
HCT VFR BLD AUTO: 33.5 % (ref 35–45)
HGB BLD-MCNC: 10.6 G/DL (ref 12–16)
HGB UR QL STRIP: ABNORMAL
KETONES UR QL STRIP.AUTO: NEGATIVE MG/DL
LEUKOCYTE ESTERASE UR QL STRIP.AUTO: NEGATIVE
LIPASE SERPL-CCNC: 35 U/L (ref 73–393)
LYMPHOCYTES # BLD: 1.5 K/UL (ref 0.9–3.6)
LYMPHOCYTES NFR BLD: 14 % (ref 21–52)
MCH RBC QN AUTO: 27.7 PG (ref 24–34)
MCHC RBC AUTO-ENTMCNC: 31.6 G/DL (ref 31–37)
MCV RBC AUTO: 87.7 FL (ref 74–97)
MONOCYTES # BLD: 0.3 K/UL (ref 0.05–1.2)
MONOCYTES NFR BLD: 3 % (ref 3–10)
MUCOUS THREADS URNS QL MICRO: ABNORMAL /LPF
NEUTS SEG # BLD: 9.2 K/UL (ref 1.8–8)
NEUTS SEG NFR BLD: 83 % (ref 40–73)
NITRITE UR QL STRIP.AUTO: NEGATIVE
PH UR STRIP: 6 [PH] (ref 5–8)
PLATELET # BLD AUTO: 300 K/UL (ref 135–420)
PMV BLD AUTO: 9 FL (ref 9.2–11.8)
POTASSIUM SERPL-SCNC: 3.9 MMOL/L (ref 3.5–5.5)
PROT SERPL-MCNC: 6.3 G/DL (ref 6.4–8.2)
PROT UR STRIP-MCNC: NEGATIVE MG/DL
RBC # BLD AUTO: 3.82 M/UL (ref 4.2–5.3)
RBC #/AREA URNS HPF: >100 /HPF (ref 0–5)
SODIUM SERPL-SCNC: 142 MMOL/L (ref 136–145)
SP GR UR REFRACTOMETRY: >1.03 (ref 1–1.03)
UROBILINOGEN UR QL STRIP.AUTO: 0.2 EU/DL (ref 0.2–1)
WBC # BLD AUTO: 11 K/UL (ref 4.6–13.2)
WBC URNS QL MICRO: ABNORMAL /HPF (ref 0–5)

## 2019-04-21 PROCEDURE — 74177 CT ABD & PELVIS W/CONTRAST: CPT

## 2019-04-21 PROCEDURE — 83690 ASSAY OF LIPASE: CPT

## 2019-04-21 PROCEDURE — 74011250636 HC RX REV CODE- 250/636: Performed by: PHYSICIAN ASSISTANT

## 2019-04-21 PROCEDURE — 85025 COMPLETE CBC W/AUTO DIFF WBC: CPT

## 2019-04-21 PROCEDURE — 74011250637 HC RX REV CODE- 250/637: Performed by: PHYSICIAN ASSISTANT

## 2019-04-21 PROCEDURE — 96374 THER/PROPH/DIAG INJ IV PUSH: CPT

## 2019-04-21 PROCEDURE — 74011636320 HC RX REV CODE- 636/320: Performed by: EMERGENCY MEDICINE

## 2019-04-21 PROCEDURE — 80053 COMPREHEN METABOLIC PANEL: CPT

## 2019-04-21 PROCEDURE — 81001 URINALYSIS AUTO W/SCOPE: CPT

## 2019-04-21 PROCEDURE — 99285 EMERGENCY DEPT VISIT HI MDM: CPT

## 2019-04-21 RX ORDER — HYDROCODONE BITARTRATE AND ACETAMINOPHEN 5; 325 MG/1; MG/1
1 TABLET ORAL
Status: COMPLETED | OUTPATIENT
Start: 2019-04-21 | End: 2019-04-21

## 2019-04-21 RX ORDER — NYSTATIN AND TRIAMCINOLONE ACETONIDE 100000; 1 [USP'U]/G; MG/G
CREAM TOPICAL 3 TIMES DAILY
Qty: 30 G | Refills: 2 | Status: SHIPPED | OUTPATIENT
Start: 2019-04-21

## 2019-04-21 RX ORDER — MORPHINE SULFATE 2 MG/ML
4 INJECTION, SOLUTION INTRAMUSCULAR; INTRAVENOUS
Status: COMPLETED | OUTPATIENT
Start: 2019-04-21 | End: 2019-04-21

## 2019-04-21 RX ORDER — HYDROCODONE BITARTRATE AND ACETAMINOPHEN 5; 325 MG/1; MG/1
1 TABLET ORAL
Qty: 20 TAB | Refills: 0 | Status: SHIPPED | OUTPATIENT
Start: 2019-04-21 | End: 2019-04-24

## 2019-04-21 RX ADMIN — HYDROCODONE BITARTRATE AND ACETAMINOPHEN 1 TABLET: 5; 325 TABLET ORAL at 19:26

## 2019-04-21 RX ADMIN — MORPHINE SULFATE 4 MG: 2 INJECTION, SOLUTION INTRAMUSCULAR; INTRAVENOUS at 20:48

## 2019-04-21 RX ADMIN — IOPAMIDOL 100 ML: 612 INJECTION, SOLUTION INTRAVENOUS at 19:49

## 2019-04-21 NOTE — ED TRIAGE NOTES
Pt arrived per EMS with c/o mid abdominal pain due to inflamed/irritated fistula. Fistula is visibly draining green discharge.

## 2019-04-21 NOTE — ED PROVIDER NOTES
EMERGENCY DEPARTMENT HISTORY AND PHYSICAL EXAM 
 
Date: 4/21/2019 Patient Name: Na Quinonez History of Presenting Illness Chief Complaint Patient presents with  
 Skin Problem History Provided By: Patient Chief Complaint: abd pain Additional History (Context):  
6:11 PM 
Na Quinonez is a 76 y.o. female with PMHX abd fistula presents to the emergency department C/O abdominal pain that began yesterday. She has drainage from her fistula site which is not new. She denies any fevers nausea or vomiting. She normally takes Norco for pain but ran out. PCP: None Current Outpatient Medications Medication Sig Dispense Refill  
 HYDROcodone-acetaminophen (NORCO) 5-325 mg per tablet Take 1 Tab by mouth every four (4) hours as needed for Pain for up to 3 days. Max Daily Amount: 6 Tabs. Indications: Pain 20 Tab 0  
 nystatin-triamcinolone (MYCOLOG II) topical cream Apply  to affected area three (3) times daily. 30 g 2  
 lipase-protease-amylase (CREON) 12,000-38,000 -60,000 unit capsule Take  by mouth three (3) times daily (with meals).  loperamide (IMODIUM) 2 mg capsule Take  by mouth.  buPROPion XL (WELLBUTRIN XL) 150 mg tablet Take 150 mg by mouth every morning.  DULoxetine (CYMBALTA) 20 mg capsule Take 20 mg by mouth daily.  Cetirizine 10 mg cap Take  by mouth.  albuterol-ipratropium (DUO-NEB) 2.5 mg-0.5 mg/3 ml nebu 3 mL by Nebulization route.  albuterol (PROVENTIL HFA, VENTOLIN HFA, PROAIR HFA) 90 mcg/actuation inhaler Take 1 Puff by inhalation every four (4) hours as needed for Wheezing. 1 Inhaler 1  predniSONE (DELTASONE) 20 mg tablet Take 60 mg by mouth daily for 5 days. With Breakfast 15 Tab 0 Past History Past Medical History: 
Past Medical History:  
Diagnosis Date  Asthma  Depression  Emphysema of lung ([address]  Gastroparesis  Hypothyroidism  Malabsorption   
 of fat  Pancreatic insufficiency  Restless leg   
 bilateral  
 
 
Past Surgical History: 
Past Surgical History:  
Procedure Laterality Date [address] UNLISTED Family History: 
History reviewed. No pertinent family history. Social History: 
Social History Tobacco Use  Smoking status: Never Smoker  Smokeless tobacco: Never Used Substance Use Topics  Alcohol use: Never Frequency: Never  Drug use: Not on file Allergies: 
No Known Allergies Review of Systems Review of Systems Constitutional: Negative for chills and fever. HENT: Positive for congestion. Gastrointestinal: Positive for abdominal pain. Negative for diarrhea, nausea and vomiting. Neurological: Negative for weakness and headaches. All other systems reviewed and are negative. Physical Exam  
 
Vitals:  
 04/21/19 1750 04/21/19 1926 04/21/19 1930 04/21/19 2030 BP: 130/82 124/73 129/72 135/80 Pulse: 76 Resp: 16 Temp: 98 °F (36.7 °C) SpO2: 98% 98% 99% 99% Weight: 44.9 kg (99 lb) Height: 5' 3\" (1.6 m) Physical Exam  
Constitutional: She is oriented to person, place, and time. She appears well-developed and well-nourished. Alert, lying on stretcher, NAD, non toxic HENT:  
Head: Normocephalic and atraumatic. Neck: Normal range of motion. Neck supple. Cardiovascular: Normal rate, regular rhythm, normal heart sounds and intact distal pulses. No murmur heard. Pulmonary/Chest: Effort normal and breath sounds normal. No respiratory distress. She has no wheezes. She has no rales. Abdominal: Soft. Bowel sounds are normal. There is tenderness in the epigastric area and periumbilical area. Neurological: She is alert and oriented to person, place, and time. Skin: Skin is warm and dry. Psychiatric: She has a normal mood and affect. Judgment normal.  
Nursing note and vitals reviewed. Diagnostic Study Results Labs: No results found for this or any previous visit (from the past 12 hour(s)). Radiologic Studies:  
CT ABD PELV W CONT Final Result IMPRESSION:  
  
Postoperative changes seen about the stomach with suggestion of a  
gastrojejunostomy. Sliver of low density fluid seen at the operative site  
extending to the skin suspicious for a fistulous tract. No drainable fluid  
collection seen. There is mild thickening of the proximal jejunum just distal to  
the gastrojejunostomy which may reflect marginal ulcer disease. Consider upper GI series for further evaluation. Large amount of stool in the colon. No bowel obstruction. CT Results  (Last 48 hours) 04/21/19 2004  CT ABD PELV W CONT Final result Impression:  IMPRESSION:  
   
Postoperative changes seen about the stomach with suggestion of a  
gastrojejunostomy. Sliver of low density fluid seen at the operative site  
extending to the skin suspicious for a fistulous tract. No drainable fluid  
collection seen. There is mild thickening of the proximal jejunum just distal to  
the gastrojejunostomy which may reflect marginal ulcer disease. Consider upper GI series for further evaluation. Large amount of stool in the colon. No bowel obstruction. Narrative:  EXAM: CT of the abdomen and pelvis INDICATION: Abdominal pain COMPARISON: None. TECHNIQUE: Axial CT imaging of the abdomen and pelvis was performed with  
intravenous contrast. Multiplanar reformats were generated. One or more dose  
reduction techniques were used on this CT: automated exposure control,  
adjustment of the mAs and/or kVp according to patient size, and iterative  
reconstruction techniques. The specific techniques used on this CT exam have  
been documented in the patient's electronic medical record.  Digital Imaging and  
Communications in Medicine (DICOM) format image data are available to  
 nonaffiliated external healthcare facilities or entities on a secure, media  
free, reciprocally searchable basis with patient authorization for at least a  
12-month period after this study. _______________ FINDINGS:  
   
LOWER CHEST: There are moderate to severe emphysematous changes. LIVER, BILIARY: Liver is normal. No biliary dilation. Gallbladder is  
unremarkable. PANCREAS: Normal.  
   
SPLEEN: Normal.  
   
ADRENALS: Normal.  
   
KIDNEYS: There is a 5 mm calculus in the lower pole the right kidney. Kidneys  
demonstrate no hydronephrosis. There is a prominent right extrarenal pelvis. VASCULATURE: Moderate calcific atherosclerosis present. There is ectasia of the  
infrarenal abdominal aorta measuring 2 cm. LYMPH NODES: No enlarged lymph nodes. GASTROINTESTINAL TRACT: There are postoperative changes seen about the stomach. There is suggestion of a gastrojejunostomy. There is low density area anterior  
to the stomach and a slightly to the left of the stomach tracking to the skin at  
the midline. This sliver of fluid collection is best seen on axial image 23 and  
measures approximately 7 mm in width and extends proximally 6 cm in length to  
the skin suggesting a fistulous tract. No drainable intra-abdominal fluid  
collections seen. There is suggestion of a gastrojejunostomy and there is some  
thickening of the proximal portion of the jejunum possibly related to peptic  
disease. Moderate to large amount of stool present in the colon. Appendix is not clearly  
visualized. No inflammation seen the right lower quadrant to suggest acute  
appendicitis. There are postsurgical changes seen about the colon. PELVIC ORGANS: There is a 2.5 x 2.5 cm cystic structure in the right hemipelvis  
suggesting a adnexal cyst.  
   
BONES: No acute or aggressive osseous abnormalities identified. OTHER: None.  
   
_______________ Medical Decision Making I am the first provider for this patient. I reviewed the vital signs, available nursing notes, past medical history, past surgical history, family history and social history. Vital Signs: Reviewed the patient's vital signs. Pulse Oximetry Analysis: 98% on RA Records Reviewed: Nursing Notes and Old Medical Records Procedures: 
Procedures ED Course:  
6:11 PM Initial assessment performed. The patients presenting problems have been discussed, and they are in agreement with the care plan formulated and outlined with them. I have encouraged them to ask questions as they arise throughout their visit. SIGN OUT: 
8:24 PM 
Patient's presentation, labs/imaging and plan of care was reviewed with Lee Kuo PA-C as part of sign out. They will follow up on CT as part of the plan discussed with the patient. Benny Gonzalez's assistance in completion of this plan is greatly appreciated but it should be noted that I will be the provider of record for this patient. 9:20 PM 
Spoke to the patient regarding the results of her CT scan. There is no obvious abscess as indicated by the radiologist.  Caitlin Sinclair a lot of the inflammation that he is seeing is related just to her chronic fistula. Very happy. She is followed by Dr. Carolina Ocampo, surgeon. Has a scheduled appointment to see him very shortly. Has been seen by him already. Status post fistulogram.  Discussions are ongoing regarding whether he can I do about her chronic fistula formation / repair. Patient also appears to have a fungal type inflammation around the stoma. Moisture associated skin disorder. Discussed medication and treatment. Also will refill her [place].  Discharged home. Patient requested referrals for internal medicine and gastroenterology. Not happy with the receptiveness she is getting from [facility] regarding seeing the specialists and internist. 
 
 
 
Discussion: Pt presents with patient with complicated surgical abdominal history with chronic fistula. She is complaining of increased abdominal pain. Is afebrile no leukocytosis. States she ran out of her Norco.  Patient is concerned for infection. Abdominal CT pending. strict return precautions given, pt offering no questions or complaints. Diagnosis and Disposition DISCHARGE NOTE: 
Victoriano Barker's  results have been reviewed with her. She has been counseled regarding her diagnosis, treatment, and plan. She verbally conveys understanding and agreement of the signs, symptoms, diagnosis, treatment and prognosis and additionally agrees to follow up as discussed. She also agrees with the care-plan and conveys that all of her questions have been answered. I have also provided discharge instructions for her that include: educational information regarding their diagnosis and treatment, and list of reasons why they would want to return to the ED prior to their follow-up appointment, should her condition change. She has been provided with education for proper emergency department utilization. CLINICAL IMPRESSION: 
 
1. Abdominal pain, generalized 2. Fistula of digestive system 3. Dermatitis associated with moisture 4. Medication refill PLAN: 
1. D/C Home 2. Discharge Medication List as of 4/21/2019  9:27 PM  
  
START taking these medications Details HYDROcodone-acetaminophen (NORCO) 5-325 mg per tablet Take 1 Tab by mouth every four (4) hours as needed for Pain for up to 3 days. Max Daily Amount: 6 Tabs. Indications: Pain, Print, Disp-20 Tab, R-0  
  
nystatin-triamcinolone (MYCOLOG II) topical cream Apply  to affected area three (3) times daily. , Print, Disp-30 g, R-2  
  
  
CONTINUE these medications which have NOT CHANGED Details  
lipase-protease-amylase (CREON) 12,000-38,000 -60,000 unit capsule Take  by mouth three (3) times daily (with meals). , Historical Med  
  
 loperamide (IMODIUM) 2 mg capsule Take  by mouth., Historical Med  
  
buPROPion XL (WELLBUTRIN XL) 150 mg tablet Take 150 mg by mouth every morning., Historical Med DULoxetine (CYMBALTA) 20 mg capsule Take 20 mg by mouth daily. , Historical Med Cetirizine 10 mg cap Take  by mouth., Historical Med  
  
albuterol-ipratropium (DUO-NEB) 2.5 mg-0.5 mg/3 ml nebu 3 mL by Nebulization route., Historical Med  
  
albuterol (PROVENTIL HFA, VENTOLIN HFA, PROAIR HFA) 90 mcg/actuation inhaler Take 1 Puff by inhalation every four (4) hours as needed for Wheezing., Print, Disp-1 Inhaler, R-1  
  
predniSONE (DELTASONE) 20 mg tablet Take 60 mg by mouth daily for 5 days. With Breakfast, Print, Disp-15 Tab, R-0  
  
  
 
3. Follow-up Information Follow up With Specialties Details Why Contact Info Preston Mohan MD Family Practice, Palliative Medicine Call in 1 day To make an appointment Viry Martinez [address] [facility] [place] 
[phone] Sadiq Butler MD Gastroenterology Call in 1 day Make appointment - GI [address] [facility] [address] [place] [address] 
[phone] [facility] EMERGENCY DEPT Emergency Medicine  If symptoms worsen [address] [place] [address] [phone]

## 2019-04-22 NOTE — DISCHARGE INSTRUCTIONS

## 2019-04-22 NOTE — ED NOTES
Pt now reporting blood tinge discharge from fistula. Will update provider. Pain re-assessment as documented.

## 2019-04-22 NOTE — ED NOTES
Discharge instructions reviewed with opportunity for questions provided. Pt vocalized understanding. Armband removed and shredded. Pt stable condition at time of discharge. Daughter standing by in lobby for transport.

## 2019-04-28 ENCOUNTER — HOSPITAL ENCOUNTER (EMERGENCY)
Age: 69
Discharge: HOME | End: 2019-04-28
Attending: EMERGENCY MEDICINE
Payer: MEDICARE

## 2019-04-28 ENCOUNTER — APPOINTMENT (OUTPATIENT)
Dept: ULTRASOUND IMAGING | Age: 69
End: 2019-04-28
Attending: EMERGENCY MEDICINE
Payer: MEDICARE

## 2019-04-28 ENCOUNTER — APPOINTMENT (OUTPATIENT)
Dept: CT IMAGING | Age: 69
End: 2019-04-28
Attending: EMERGENCY MEDICINE
Payer: MEDICARE

## 2019-04-28 VITALS
HEIGHT: 60 IN | HEART RATE: 99 BPM | WEIGHT: 98 LBS | TEMPERATURE: 97.5 F | SYSTOLIC BLOOD PRESSURE: 112 MMHG | DIASTOLIC BLOOD PRESSURE: 92 MMHG | BODY MASS INDEX: 19.24 KG/M2 | RESPIRATION RATE: 16 BRPM | OXYGEN SATURATION: 100 %

## 2019-04-28 DIAGNOSIS — K63.2 ENTEROCUTANEOUS FISTULA: Primary | ICD-10-CM

## 2019-04-28 DIAGNOSIS — N83.201 CYST OF RIGHT OVARY: ICD-10-CM

## 2019-04-28 LAB
ALBUMIN SERPL-MCNC: 2.7 G/DL (ref 3.4–5)
ALBUMIN/GLOB SERPL: 0.8 {RATIO} (ref 0.8–1.7)
ALP SERPL-CCNC: 127 U/L (ref 45–117)
ALT SERPL-CCNC: 17 U/L (ref 13–56)
ANION GAP SERPL CALC-SCNC: 7 MMOL/L (ref 3–18)
AST SERPL-CCNC: 14 U/L (ref 15–37)
BASOPHILS # BLD: 0 K/UL (ref 0–0.1)
BASOPHILS NFR BLD: 0 % (ref 0–2)
BILIRUB SERPL-MCNC: 0.3 MG/DL (ref 0.2–1)
BUN SERPL-MCNC: 13 MG/DL (ref 7–18)
BUN/CREAT SERPL: 21 (ref 12–20)
CALCIUM SERPL-MCNC: 8.8 MG/DL (ref 8.5–10.1)
CHLORIDE SERPL-SCNC: 108 MMOL/L (ref 100–108)
CO2 SERPL-SCNC: 26 MMOL/L (ref 21–32)
CREAT SERPL-MCNC: 0.61 MG/DL (ref 0.6–1.3)
DIFFERENTIAL METHOD BLD: ABNORMAL
EOSINOPHIL # BLD: 0.4 K/UL (ref 0–0.4)
EOSINOPHIL NFR BLD: 4 % (ref 0–5)
ERYTHROCYTE [DISTWIDTH] IN BLOOD BY AUTOMATED COUNT: 15.6 % (ref 11.6–14.5)
GLOBULIN SER CALC-MCNC: 3.4 G/DL (ref 2–4)
GLUCOSE SERPL-MCNC: 84 MG/DL (ref 74–99)
HCT VFR BLD AUTO: 38.4 % (ref 35–45)
HGB BLD-MCNC: 12.1 G/DL (ref 12–16)
LIPASE SERPL-CCNC: 41 U/L (ref 73–393)
LYMPHOCYTES # BLD: 2.9 K/UL (ref 0.9–3.6)
LYMPHOCYTES NFR BLD: 31 % (ref 21–52)
MCH RBC QN AUTO: 27.9 PG (ref 24–34)
MCHC RBC AUTO-ENTMCNC: 31.5 G/DL (ref 31–37)
MCV RBC AUTO: 88.5 FL (ref 74–97)
MONOCYTES # BLD: 0.9 K/UL (ref 0.05–1.2)
MONOCYTES NFR BLD: 10 % (ref 3–10)
NEUTS SEG # BLD: 5.1 K/UL (ref 1.8–8)
NEUTS SEG NFR BLD: 55 % (ref 40–73)
PLATELET # BLD AUTO: 333 K/UL (ref 135–420)
PMV BLD AUTO: 9.5 FL (ref 9.2–11.8)
POTASSIUM SERPL-SCNC: 3.5 MMOL/L (ref 3.5–5.5)
PROT SERPL-MCNC: 6.1 G/DL (ref 6.4–8.2)
RBC # BLD AUTO: 4.34 M/UL (ref 4.2–5.3)
SODIUM SERPL-SCNC: 141 MMOL/L (ref 136–145)
WBC # BLD AUTO: 9.3 K/UL (ref 4.6–13.2)

## 2019-04-28 PROCEDURE — 74011250636 HC RX REV CODE- 250/636: Performed by: EMERGENCY MEDICINE

## 2019-04-28 PROCEDURE — 74011636320 HC RX REV CODE- 636/320: Performed by: EMERGENCY MEDICINE

## 2019-04-28 PROCEDURE — 85025 COMPLETE CBC W/AUTO DIFF WBC: CPT

## 2019-04-28 PROCEDURE — 96361 HYDRATE IV INFUSION ADD-ON: CPT

## 2019-04-28 PROCEDURE — 83690 ASSAY OF LIPASE: CPT

## 2019-04-28 PROCEDURE — 99282 EMERGENCY DEPT VISIT SF MDM: CPT

## 2019-04-28 PROCEDURE — 80053 COMPREHEN METABOLIC PANEL: CPT

## 2019-04-28 PROCEDURE — 74177 CT ABD & PELVIS W/CONTRAST: CPT

## 2019-04-28 PROCEDURE — 96360 HYDRATION IV INFUSION INIT: CPT

## 2019-04-28 PROCEDURE — 76830 TRANSVAGINAL US NON-OB: CPT

## 2019-04-28 RX ORDER — LEVOTHYROXINE SODIUM 112 UG/1
88 TABLET ORAL
COMMUNITY

## 2019-04-28 RX ORDER — HYDROCODONE BITARTRATE AND ACETAMINOPHEN 5; 325 MG/1; MG/1
1 TABLET ORAL
Status: ON HOLD | COMMUNITY
End: 2019-10-08 | Stop reason: SDUPTHER

## 2019-04-28 RX ADMIN — SODIUM CHLORIDE 1000 ML: 900 INJECTION, SOLUTION INTRAVENOUS at 14:38

## 2019-04-28 RX ADMIN — IOPAMIDOL 100 ML: 612 INJECTION, SOLUTION INTRAVENOUS at 15:59

## 2019-04-28 NOTE — DISCHARGE INSTRUCTIONS
You were seen and evaluated in the Emergency Department. Please understand that your work up is not all encompassing and you should follow up with your primary care physician for further management and continuity of care. Please return to Emergency Department or seek medical attention immediately if you have acute worsening in your symptoms or develop chest pain, shortness of breath, repeated vomiting, fever, altered level of consciousness, coughing up blood, or start sweating and feel clammy. If you were prescribed any medicine for home, please take as prescribed by your health-care provider. If you were given any follow-up appointments or numbers to call, please do so as instructed. Avoid any tobacco products or excessive alcohol. Patient Education        Functional Ovarian Cyst: Care Instructions  Your Care Instructions    A functional ovarian cyst is a sac that forms on the surface of a woman's ovary during ovulation. The sac holds a maturing egg. Usually the sac goes away after the egg is released. But if the egg is not released, or if the sac closes up after the egg is released, the sac can swell up with fluid and form a cyst.  Functional ovarian cysts are different than ovarian growths caused by other problems, such as cancer. Most functional ovarian cysts cause no symptoms and go away on their own. Some cause mild pain. Others can cause severe pain when they rupture or bleed. Follow-up care is a key part of your treatment and safety. Be sure to make and go to all appointments, and call your doctor if you are having problems. It's also a good idea to know your test results and keep a list of the medicines you take. How can you care for yourself at home? · Use heat, such as a hot water bottle, a heating pad set on low, or a warm bath, to relax tense muscles and relieve cramping. · Be safe with medicines. Take pain medicines exactly as directed.   ? If the doctor gave you a prescription medicine for pain, take it as prescribed. ? If you are not taking a prescription pain medicine, ask your doctor if you can take an over-the-counter medicine. · Avoid constipation. Make sure you drink enough fluids and include fruits, vegetables, and fiber in your diet each day. Constipation does not cause ovarian cysts, but it may make your pelvic pain worse. When should you call for help? Call your doctor now or seek immediate medical care if:    · You have severe vaginal bleeding.     · You have new or worse belly or pelvic pain.    Watch closely for changes in your health, and be sure to contact your doctor if:    · You have unusual vaginal bleeding.     · You do not get better as expected. Where can you learn more? Go to http://rocky-kenneth.info/. Enter Z455 in the search box to learn more about \"Functional Ovarian Cyst: Care Instructions. \"  Current as of: May 14, 2018  Content Version: 11.9  © 9311-5961 [employer]. Care instructions adapted under license by [employer] (which disclaims liability or warranty for this information). If you have questions about a medical condition or this instruction, always ask your healthcare professional. [place] [address] any warranty or liability for your use of this information.

## 2019-04-28 NOTE — ED PROVIDER NOTES
EMERGENCY DEPARTMENT HISTORY AND PHYSICAL EXAM 
 
Date: 4/28/2019 Patient Name: Priti Amador History of Presenting Illness Chief Complaint Patient presents with  Abdominal Pain History Provided By: Patient Additional History (Context):  
Priti Amador is a 76 y.o. female with PMHX enterocutaneous fistula, gastroparesis, pancreatic insufficiency who is followed by Dr. Jamila Diamond who presents to the emergency department C/O new onset right lower quadrant pain that started this morning. Patient reports that she does have a history of right-sided ovarian cyst and was last told that it was approximately 4 cm in size. Patient was recently seen in the emergency department 7 days ago for possible infection of her enterocutaneous fistula. No acute abdominal findings with suggestion patient states that she has followed up with Dr. Jamila Diamond who then referred her to wound clinic. We will follow-up with him in 3 months. Pt denies nausea, vomiting, fever, dysuria or hematuria, and any other sxs or complaints. PCP: None Current Outpatient Medications Medication Sig Dispense Refill  
 HYDROcodone-acetaminophen (NORCO) 5-325 mg per tablet Take 1 Tab by mouth every four (4) hours as needed for Pain.  TEMAZEPAM PO Take  by mouth as needed.  ropinirole HCl (REQUIP PO) Take  by mouth as needed.  levothyroxine (SYNTHROID) 112 mcg tablet Take 112 mcg by mouth Daily (before breakfast).  nystatin-triamcinolone (MYCOLOG II) topical cream Apply  to affected area three (3) times daily. 30 g 2  
 lipase-protease-amylase (CREON) 12,000-38,000 -60,000 unit capsule Take  by mouth three (3) times daily (with meals).  loperamide (IMODIUM) 2 mg capsule Take  by mouth.  buPROPion XL (WELLBUTRIN XL) 150 mg tablet Take 150 mg by mouth every morning.  DULoxetine (CYMBALTA) 20 mg capsule Take 20 mg by mouth daily.  Cetirizine 10 mg cap Take  by mouth.  albuterol-ipratropium (DUO-NEB) 2.5 mg-0.5 mg/3 ml nebu 3 mL by Nebulization route.  albuterol (PROVENTIL HFA, VENTOLIN HFA, PROAIR HFA) 90 mcg/actuation inhaler Take 1 Puff by inhalation every four (4) hours as needed for Wheezing. 1 Inhaler 1 Past History Past Medical History: 
Past Medical History:  
Diagnosis Date  Asthma  Depression  Emphysema of lung ([address]  Gastroparesis  Hypothyroidism  Malabsorption   
 of fat  Pancreatic insufficiency  Restless leg   
 bilateral  
 
 
Past Surgical History: 
Past Surgical History:  
Procedure Laterality Date [address] [place] UNLISTED  HX HERNIA REPAIR    
 HX SMALL BOWEL RESECTION    
 intra aortic baloon pump  HX TUBAL LIGATION Family History: 
History reviewed. No pertinent family history. Social History: 
Social History Tobacco Use  Smoking status: Former Smoker  Smokeless tobacco: Never Used Substance Use Topics  Alcohol use: Never Frequency: Never  Drug use: Not Currently Allergies: 
No Known Allergies Review of Systems Review of Systems Constitutional: Negative for chills and fever. HENT: Negative for congestion, ear pain, sinus pain and sore throat. Eyes: Negative for pain and visual disturbance. Respiratory: Negative for cough and shortness of breath. Cardiovascular: Negative for chest pain and leg swelling. Gastrointestinal: Positive for abdominal pain. Negative for constipation, diarrhea, nausea and vomiting. Genitourinary: Negative for dysuria, hematuria, vaginal bleeding and vaginal discharge. Musculoskeletal: Negative for back pain and neck pain. Skin: Negative for rash and wound. Neurological: Negative for dizziness, tremors, weakness, light-headedness and numbness. All other systems reviewed and are negative. Physical Exam  
 
Vitals:  
 04/28/19 1342 BP: (!) 112/92 Pulse: 99 Resp: 16 Temp: 97.5 °F (36.4 °C) SpO2: 100% Weight: 44.5 kg (98 lb) Height: 5' (1.524 m) Physical Exam  
Abdominal:  
 
 
 
 
Nursing note and vitals reviewed Constitutional: Elderly  female, no acute distress Head: Normocephalic, Atraumatic Eyes: Pupils are equal, round, and reactive to light, EOMI Neck: Supple, non-tender Cardiovascular: Regular rate and rhythm, no murmurs, rubs, or gallops Chest: Normal work of breathing and chest excursion bilaterally Lungs: Clear to ausculation bilaterally, no wheezes, no rhonchi Abdomen: Soft, right lower quadrant tenderness with no rebound or guarding, non distended, normoactive bowel sounds, midline, vertical erythematous track along her abdomen with mild discharge Back: No evidence of trauma or deformity Extremities: No evidence of trauma or deformity, no LE edema Skin: Warm and dry, normal cap refill Neuro: Alert and appropriate, CN intact, normal speech, moving all 4 extremities freely and symmetrically Psychiatric: Normal mood and affect Diagnostic Study Results Labs - Recent Results (from the past 12 hour(s)) CBC WITH AUTOMATED DIFF Collection Time: 04/28/19  2:15 PM  
Result Value Ref Range WBC 9.3 4.6 - 13.2 K/uL  
 RBC 4.34 4.20 - 5.30 M/uL  
 HGB 12.1 12.0 - 16.0 g/dL HCT 38.4 35.0 - 45.0 % MCV 88.5 74.0 - 97.0 FL  
 MCH 27.9 24.0 - 34.0 PG  
 MCHC 31.5 31.0 - 37.0 g/dL  
 RDW 15.6 (H) 11.6 - 14.5 % PLATELET 617 481 - 130 K/uL MPV 9.5 9.2 - 11.8 FL  
 NEUTROPHILS 55 40 - 73 % LYMPHOCYTES 31 21 - 52 % MONOCYTES 10 3 - 10 % EOSINOPHILS 4 0 - 5 % BASOPHILS 0 0 - 2 %  
 ABS. NEUTROPHILS 5.1 1.8 - 8.0 K/UL  
 ABS. LYMPHOCYTES 2.9 0.9 - 3.6 K/UL  
 ABS. MONOCYTES 0.9 0.05 - 1.2 K/UL  
 ABS. EOSINOPHILS 0.4 0.0 - 0.4 K/UL  
 ABS. BASOPHILS 0.0 0.0 - 0.1 K/UL  
 DF AUTOMATED METABOLIC PANEL, COMPREHENSIVE Collection Time: 04/28/19  2:15 PM  
Result Value Ref Range  Sodium 141 136 - 145 mmol/L  
 Potassium 3.5 3.5 - 5.5 mmol/L Chloride 108 100 - 108 mmol/L  
 CO2 26 21 - 32 mmol/L Anion gap 7 3.0 - 18 mmol/L Glucose 84 74 - 99 mg/dL BUN 13 7.0 - 18 MG/DL Creatinine 0.61 0.6 - 1.3 MG/DL  
 BUN/Creatinine ratio 21 (H) 12 - 20 GFR est AA >60 >60 ml/min/1.73m2 GFR est non-AA >60 >60 ml/min/1.73m2 Calcium 8.8 8.5 - 10.1 MG/DL Bilirubin, total 0.3 0.2 - 1.0 MG/DL  
 ALT (SGPT) 17 13 - 56 U/L  
 AST (SGOT) 14 (L) 15 - 37 U/L Alk. phosphatase 127 (H) 45 - 117 U/L Protein, total 6.1 (L) 6.4 - 8.2 g/dL Albumin 2.7 (L) 3.4 - 5.0 g/dL Globulin 3.4 2.0 - 4.0 g/dL A-G Ratio 0.8 0.8 - 1.7 LIPASE Collection Time: 04/28/19  2:15 PM  
Result Value Ref Range Lipase 41 (L) 73 - 393 U/L Radiologic Studies -  
US TRANSVAGINAL Final Result IMPRESSION:  
  
Findings of a right ovarian follicle within normal limits for age. Left ovary not visualized in this exam.  
  
The posterior lower midline pelvic cystic structure is not visualized on this  
exam as correlated to the CT comparison exams and therefore recommend further  
evaluation with pelvic MRI which can be performed on a nonemergent basis. CT ABD PELV W CONT Final Result IMPRESSION:  
  
Postsurgical changes of the stomach and bowel for which there are regional  
inflammatory changes near the surgical gastric bed with findings concerning for  
underlying abdominal wall fistula formation and potential developing abscess  
and/or phlegmon formation anteriorly and along the left lateral margin. Separate pelvic and adnexal cystic structures. Recommend pelvic ultrasound or MRI when clinically feasible to ensure no underlying ovarian neoplasm. CT Results  (Last 48 hours) 04/28/19 1610  CT ABD PELV W CONT Final result  Impression:  IMPRESSION:  
   
Postsurgical changes of the stomach and bowel for which there are regional  
 inflammatory changes near the surgical gastric bed with findings concerning for  
underlying abdominal wall fistula formation and potential developing abscess  
and/or phlegmon formation anteriorly and along the left lateral margin. Separate pelvic and adnexal cystic structures. Recommend pelvic ultrasound or MRI when clinically feasible to ensure no underlying ovarian neoplasm. Narrative:  EXAM: CT ABD PELV W CONT INDICATION: Abdominal pain, RLQ  
   
COMPARISON: CT 4/21/2019 CONTRAST: 100 mL of Isovue-300. TECHNIQUE:   
Following the uneventful intravenous administration of contrast, thin axial  
images were obtained through the abdomen and pelvis. Coronal and sagittal  
reconstructions were generated. Oral contrast was not administered. CT dose  
reduction was achieved through use of a standardized protocol tailored for this  
examination and automatic exposure control for dose modulation. FINDINGS:   
LUNG BASES: Emphysematous changes. INCIDENTALLY IMAGED HEART AND MEDIASTINUM: Aortic valve calcification. Atherosclerosis. LIVER: No mass or biliary dilatation. GALLBLADDER: The gallbladder is distended. SPLEEN: No mass. PANCREAS: No mass or ductal dilatation. ADRENALS: Unremarkable. KIDNEYS: Bilateral nephrolithiasis. No hydronephrosis. STOMACH: There are postsurgical changes suggestive of prior gastrojejunostomy. There is a somewhat ill defined tract near the surgical bed at the midline,  
image 25 series 2 which extends to the skin surface. This area measures up to 18  
mm in the transverse direction on axial image 23 series 2. There also a  
subcentimeter ill-defined pocket of fluid collection and air within the lateral  
margin of the surgical bed. SMALL BOWEL: No evidence of bowel obstruction. There is large colonic stool  
burden. COLON: As above. APPENDIX: No appendicitis.   
PERITONEUM: There is an organized simple fluid oval-shaped attenuated structure  
in the right lower pelvis. This structure measures 37 mm. RETROPERITONEUM: No lymphadenopathy or aortic aneurysm. REPRODUCTIVE ORGANS: Apart from the deep posterior fluid attenuated structure in  
the pelvis there is a right adnexal fluid attenuation structure which measures 25 mm. URINARY BLADDER: No mass or calculus. BONES: Dgenerative changes of the skeleton. Osteopenia. ADDITIONAL COMMENTS: N/A  
   
  
  
 
CXR Results  (Last 48 hours) None Medical Decision Making I am the first provider for this patient. I reviewed the vital signs, available nursing notes, past medical history, past surgical history, family history and social history. Vital Signs-Reviewed the patient's vital signs. Pulse Oximetry Analysis -100 % on room air Records Reviewed: Nursing Notes and Old Medical Records Provider Notes:  
76 y.o. female with a history of enterocutaneous fistula, gastroparesis, pancreatic insufficiency presenting with right lower quadrant pain that she states is new and different from baseline abdominal pain x1 day. On exam patient is afebrile with appropriate vital signs. Abdomen is soft, nondistended, but with mild tenderness of the right lower quadrant with no rebound or guarding. Vertical, midline erythema, which she states is unchanged from her baseline. Although the patient just had a CT scan 7 days ago, patient reports that her right lower quadrant abdominal pain is new that started today. Will obtain labs and a CT scan. Procedures: 
Procedures ED Course:  
1:45 PM 
 Initial assessment performed. The patients presenting problems have been discussed, and they are in agreement with the care plan formulated and outlined with them. I have encouraged them to ask questions as they arise throughout their visit. ED Course as of Apr 28 1921 Sun Apr 28, 2019  
1707 5:07 PM Discussed patient's history, exam, and available diagnostics results with Dr. Noelle Taylor, radiologist, and when compared to prior CT scan obtained 7 days ago show no appreciable drainable abscess, maybe mildly by width 17 mm today vs. 11 mm   
 [CA] 1723 Right-sided adnexa sickle fluid attenuation in the right lower pelvis, will obtain an ultrasound to evaluate. [CA] ED Course User Index 
[CA] Dada Toth, DO  
 
 
 
7:19 PM 
R ovarian follicle within normal limits for age. Will be referred for Ob/Gyn for continuing management. Diagnosis and Disposition DISCHARGE NOTE: 
7:18 PM 
 
Courtney Barker's  results have been reviewed with her. She has been counseled regarding her diagnosis, treatment, and plan. She verbally conveys understanding and agreement of the signs, symptoms, diagnosis, treatment and prognosis and additionally agrees to follow up as discussed. She also agrees with the care-plan and conveys that all of her questions have been answered. I have also provided discharge instructions for her that include: educational information regarding their diagnosis and treatment, and list of reasons why they would want to return to the ED prior to their follow-up appointment, should her condition change. She has been provided with education for proper emergency department utilization. CLINICAL IMPRESSION: 
 
1. Enterocutaneous fistula 2. Cyst of right ovary PLAN: 
1. D/C Home 2. Current Discharge Medication List  
  
 
3. Follow-up Information Follow up With Specialties Details Why Contact Info Isaiah Salas MD Proctology Schedule an appointment as soon as possible for a visit in 2 days  [address] [facility] [address] [place] [address] 
[phone] Radha Walden MD Obstetrics & Gynecology, Gynecology, Obstetrics Schedule an appointment as soon as possible for a visit in 2 days  [address] [place] [address] [facility] [address] [facility] 
[phone] [facility] EMERGENCY DE[facility] Emergency Medicine  As needed if symptoms worsen [address] [place] [id number] 
[phone]  
  
 
____________________________________ Please note that this dictation was completed with [employer], the computer voice recognition software. Quite often unanticipated grammatical, syntax, homophones, and other interpretive errors are inadvertently transcribed by the computer software. Please disregard these errors. Please excuse any errors that have escaped final proofreading.

## 2019-04-28 NOTE — ED TRIAGE NOTES
Pt c/o abd pain, osnet today, pt states hx of rt ovarian cyst, pt has fistula, pt states skin is excoriated around fistula

## 2019-04-30 ENCOUNTER — HOSPITAL ENCOUNTER (OUTPATIENT)
Dept: LAB | Age: 69
Discharge: HOME | End: 2019-04-30
Payer: MEDICARE

## 2019-04-30 LAB — CRP SERPL-MCNC: 1.9 MG/DL (ref 0–0.3)

## 2019-04-30 PROCEDURE — 86140 C-REACTIVE PROTEIN: CPT

## 2019-04-30 PROCEDURE — 36415 COLL VENOUS BLD VENIPUNCTURE: CPT

## 2019-04-30 PROCEDURE — 86671 FUNGUS NES ANTIBODY: CPT

## 2019-05-01 LAB
BAKER'S YEAST IGA QN: 62.9 UNITS (ref 0–24.9)
BAKER'S YEAST IGG QN: 95.8 UNITS (ref 0–24.9)
FAX TO INFO,FAXT: NORMAL
FAX TO NUMBER,FAXN: NORMAL

## 2019-05-10 ENCOUNTER — HOSPITAL ENCOUNTER (EMERGENCY)
Age: 69
Discharge: HOME | End: 2019-05-11
Attending: EMERGENCY MEDICINE
Payer: MEDICARE

## 2019-05-10 ENCOUNTER — APPOINTMENT (OUTPATIENT)
Dept: GENERAL RADIOLOGY | Age: 69
End: 2019-05-10
Attending: EMERGENCY MEDICINE
Payer: MEDICARE

## 2019-05-10 VITALS
RESPIRATION RATE: 21 BRPM | OXYGEN SATURATION: 100 % | TEMPERATURE: 98.2 F | HEIGHT: 60 IN | WEIGHT: 98 LBS | DIASTOLIC BLOOD PRESSURE: 68 MMHG | SYSTOLIC BLOOD PRESSURE: 117 MMHG | BODY MASS INDEX: 19.24 KG/M2 | HEART RATE: 80 BPM

## 2019-05-10 DIAGNOSIS — R06.09 DOE (DYSPNEA ON EXERTION): ICD-10-CM

## 2019-05-10 DIAGNOSIS — J44.1 COPD EXACERBATION (HCC): Primary | ICD-10-CM

## 2019-05-10 LAB
ALBUMIN SERPL-MCNC: 2.9 G/DL (ref 3.4–5)
ALBUMIN/GLOB SERPL: 0.9 {RATIO} (ref 0.8–1.7)
ALP SERPL-CCNC: 163 U/L (ref 45–117)
ALT SERPL-CCNC: 18 U/L (ref 13–56)
ANION GAP SERPL CALC-SCNC: 10 MMOL/L (ref 3–18)
APPEARANCE UR: ABNORMAL
ARTERIAL PATENCY WRIST A: ABNORMAL
AST SERPL-CCNC: 19 U/L (ref 15–37)
BACTERIA URNS QL MICRO: ABNORMAL /HPF
BASE DEFICIT BLD-SCNC: 2 MMOL/L
BASOPHILS # BLD: 0.1 K/UL (ref 0–0.1)
BASOPHILS NFR BLD: 1 % (ref 0–2)
BDY SITE: ABNORMAL
BILIRUB SERPL-MCNC: 0.2 MG/DL (ref 0.2–1)
BILIRUB UR QL: NEGATIVE
BNP SERPL-MCNC: 265 PG/ML (ref 0–900)
BODY TEMPERATURE: 98.2
BUN SERPL-MCNC: 17 MG/DL (ref 7–18)
BUN/CREAT SERPL: 21 (ref 12–20)
CALCIUM SERPL-MCNC: 9.1 MG/DL (ref 8.5–10.1)
CHLORIDE SERPL-SCNC: 107 MMOL/L (ref 100–108)
CK MB CFR SERPL CALC: NORMAL % (ref 0–4)
CK MB SERPL-MCNC: <1 NG/ML (ref 5–25)
CK SERPL-CCNC: 29 U/L (ref 26–192)
CO2 SERPL-SCNC: 24 MMOL/L (ref 21–32)
COLOR UR: YELLOW
CREAT SERPL-MCNC: 0.81 MG/DL (ref 0.6–1.3)
D DIMER PPP FEU-MCNC: 0.83 UG/ML(FEU)
DIFFERENTIAL METHOD BLD: ABNORMAL
EOSINOPHIL # BLD: 0.5 K/UL (ref 0–0.4)
EOSINOPHIL NFR BLD: 5 % (ref 0–5)
EPITH CASTS URNS QL MICRO: ABNORMAL /LPF (ref 0–5)
ERYTHROCYTE [DISTWIDTH] IN BLOOD BY AUTOMATED COUNT: 15.6 % (ref 11.6–14.5)
GAS FLOW.O2 O2 DELIVERY SYS: ABNORMAL L/MIN
GLOBULIN SER CALC-MCNC: 3.3 G/DL (ref 2–4)
GLUCOSE SERPL-MCNC: 98 MG/DL (ref 74–99)
GLUCOSE UR STRIP.AUTO-MCNC: NEGATIVE MG/DL
HCO3 BLD-SCNC: 22 MMOL/L (ref 22–26)
HCT VFR BLD AUTO: 36.7 % (ref 35–45)
HGB BLD-MCNC: 11.6 G/DL (ref 12–16)
HGB UR QL STRIP: ABNORMAL
INR PPP: 1 (ref 0.8–1.2)
KETONES UR QL STRIP.AUTO: NEGATIVE MG/DL
LEUKOCYTE ESTERASE UR QL STRIP.AUTO: ABNORMAL
LYMPHOCYTES # BLD: 3 K/UL (ref 0.9–3.6)
LYMPHOCYTES NFR BLD: 31 % (ref 21–52)
MCH RBC QN AUTO: 28 PG (ref 24–34)
MCHC RBC AUTO-ENTMCNC: 31.6 G/DL (ref 31–37)
MCV RBC AUTO: 88.4 FL (ref 74–97)
MONOCYTES # BLD: 0.7 K/UL (ref 0.05–1.2)
MONOCYTES NFR BLD: 7 % (ref 3–10)
NEUTS SEG # BLD: 5.5 K/UL (ref 1.8–8)
NEUTS SEG NFR BLD: 56 % (ref 40–73)
NITRITE UR QL STRIP.AUTO: NEGATIVE
O2/TOTAL GAS SETTING VFR VENT: 0.21 %
PCO2 BLD: 31.7 MMHG (ref 35–45)
PH BLD: 7.45 [PH] (ref 7.35–7.45)
PH UR STRIP: 5.5 [PH] (ref 5–8)
PLATELET # BLD AUTO: 317 K/UL (ref 135–420)
PMV BLD AUTO: 9.8 FL (ref 9.2–11.8)
PO2 BLD: 73 MMHG (ref 80–100)
POTASSIUM SERPL-SCNC: 4.1 MMOL/L (ref 3.5–5.5)
PROT SERPL-MCNC: 6.2 G/DL (ref 6.4–8.2)
PROT UR STRIP-MCNC: NEGATIVE MG/DL
PROTHROMBIN TIME: 13.3 SEC (ref 11.5–15.2)
RBC # BLD AUTO: 4.15 M/UL (ref 4.2–5.3)
RBC #/AREA URNS HPF: ABNORMAL /HPF (ref 0–5)
SAO2 % BLD: 96 % (ref 92–97)
SERVICE CMNT-IMP: ABNORMAL
SODIUM SERPL-SCNC: 141 MMOL/L (ref 136–145)
SP GR UR REFRACTOMETRY: 1.02 (ref 1–1.03)
SPECIMEN TYPE: ABNORMAL
TOTAL RESP. RATE, ITRR: 2
TROPONIN I SERPL-MCNC: <0.02 NG/ML (ref 0–0.04)
UROBILINOGEN UR QL STRIP.AUTO: 1 EU/DL (ref 0.2–1)
WBC # BLD AUTO: 9.7 K/UL (ref 4.6–13.2)
WBC URNS QL MICRO: ABNORMAL /HPF (ref 0–5)

## 2019-05-10 PROCEDURE — 77030013140 HC MSK NEB VYRM -A

## 2019-05-10 PROCEDURE — 99285 EMERGENCY DEPT VISIT HI MDM: CPT

## 2019-05-10 PROCEDURE — 82550 ASSAY OF CK (CPK): CPT

## 2019-05-10 PROCEDURE — 85025 COMPLETE CBC W/AUTO DIFF WBC: CPT

## 2019-05-10 PROCEDURE — 74011000250 HC RX REV CODE- 250: Performed by: EMERGENCY MEDICINE

## 2019-05-10 PROCEDURE — 36600 WITHDRAWAL OF ARTERIAL BLOOD: CPT

## 2019-05-10 PROCEDURE — 83880 ASSAY OF NATRIURETIC PEPTIDE: CPT

## 2019-05-10 PROCEDURE — 93005 ELECTROCARDIOGRAM TRACING: CPT

## 2019-05-10 PROCEDURE — 71046 X-RAY EXAM CHEST 2 VIEWS: CPT

## 2019-05-10 PROCEDURE — 80053 COMPREHEN METABOLIC PANEL: CPT

## 2019-05-10 PROCEDURE — 81001 URINALYSIS AUTO W/SCOPE: CPT

## 2019-05-10 PROCEDURE — 94640 AIRWAY INHALATION TREATMENT: CPT

## 2019-05-10 PROCEDURE — 85610 PROTHROMBIN TIME: CPT

## 2019-05-10 PROCEDURE — 82803 BLOOD GASES ANY COMBINATION: CPT

## 2019-05-10 PROCEDURE — 85379 FIBRIN DEGRADATION QUANT: CPT

## 2019-05-10 RX ORDER — IPRATROPIUM BROMIDE AND ALBUTEROL SULFATE 2.5; .5 MG/3ML; MG/3ML
3 SOLUTION RESPIRATORY (INHALATION) ONCE
Status: COMPLETED | OUTPATIENT
Start: 2019-05-10 | End: 2019-05-10

## 2019-05-10 RX ADMIN — IPRATROPIUM BROMIDE AND ALBUTEROL SULFATE 3 ML: .5; 3 SOLUTION RESPIRATORY (INHALATION) at 22:44

## 2019-05-11 ENCOUNTER — APPOINTMENT (OUTPATIENT)
Dept: CT IMAGING | Age: 69
End: 2019-05-11
Attending: EMERGENCY MEDICINE
Payer: MEDICARE

## 2019-05-11 PROCEDURE — 71275 CT ANGIOGRAPHY CHEST: CPT

## 2019-05-11 PROCEDURE — 74011250636 HC RX REV CODE- 250/636: Performed by: EMERGENCY MEDICINE

## 2019-05-11 PROCEDURE — 96374 THER/PROPH/DIAG INJ IV PUSH: CPT

## 2019-05-11 PROCEDURE — 74011636320 HC RX REV CODE- 636/320: Performed by: EMERGENCY MEDICINE

## 2019-05-11 RX ORDER — METHYLPREDNISOLONE 4 MG/1
TABLET ORAL
Qty: 1 DOSE PACK | Refills: 0 | Status: SHIPPED | OUTPATIENT
Start: 2019-05-11 | End: 2019-06-13

## 2019-05-11 RX ORDER — AZITHROMYCIN 250 MG/1
TABLET, FILM COATED ORAL
Qty: 6 TAB | Refills: 0 | Status: SHIPPED | OUTPATIENT
Start: 2019-05-11 | End: 2019-09-01

## 2019-05-11 RX ADMIN — IOPAMIDOL 100 ML: 755 INJECTION, SOLUTION INTRAVENOUS at 00:53

## 2019-05-11 RX ADMIN — METHYLPREDNISOLONE SODIUM SUCCINATE 125 MG: 125 INJECTION, POWDER, FOR SOLUTION INTRAMUSCULAR; INTRAVENOUS at 02:40

## 2019-05-11 NOTE — DISCHARGE INSTRUCTIONS

## 2019-05-11 NOTE — ED TRIAGE NOTES
Patient arrives via EMS with c/c of SOB on exertion. Patient speaking in complete sentences without any distress noted. VSS. Patient on room air 100%. No wheezing noted. Per EMS patient was 100% on Room Air as well.

## 2019-05-13 LAB
ATRIAL RATE: 78 BPM
ATRIAL RATE: 84 BPM
CALCULATED P AXIS, ECG09: 40 DEGREES
CALCULATED P AXIS, ECG09: 65 DEGREES
CALCULATED R AXIS, ECG10: 14 DEGREES
CALCULATED R AXIS, ECG10: 54 DEGREES
CALCULATED T AXIS, ECG11: 47 DEGREES
CALCULATED T AXIS, ECG11: 69 DEGREES
DIAGNOSIS, 93000: NORMAL
DIAGNOSIS, 93000: NORMAL
P-R INTERVAL, ECG05: 154 MS
P-R INTERVAL, ECG05: 158 MS
Q-T INTERVAL, ECG07: 376 MS
Q-T INTERVAL, ECG07: 378 MS
QRS DURATION, ECG06: 76 MS
QRS DURATION, ECG06: 76 MS
QTC CALCULATION (BEZET), ECG08: 430 MS
QTC CALCULATION (BEZET), ECG08: 444 MS
VENTRICULAR RATE, ECG03: 78 BPM
VENTRICULAR RATE, ECG03: 84 BPM

## 2019-05-13 NOTE — ED PROVIDER NOTES
EMERGENCY DEPARTMENT HISTORY AND PHYSICAL EXAM 
 
Date: 5/10/2019 Patient Name: Mya Gomez History of Presenting Illness Chief Complaint Patient presents with  Shortness of Breath HPI:  
11:13 AM 
Mya Gomez is a 76 y.o. female with PMHX of COPD, hypothyroidism, depression, restless leg syndrome, gastroparesis and pancreatic insufficiency status post small bowel resection who presents to the emergency department C/O shortness of breath. Patient states that shortness of breath is worse with exertion and is been progressively worsening over the last 2 to 3 days. She has an occasional dry cough. She denies wheezing. She also denies fever. She has no chest pain. PCP: None Current Outpatient Medications Medication Sig Dispense Refill  methylPREDNISolone (MEDROL, WENDY,) 4 mg tablet Us as directed 1 Dose Pack 0  
 azithromycin (ZITHROMAX Z-WENDY) 250 mg tablet Take two tablets today then one tablet daily 6 Tab 0  
 HYDROcodone-acetaminophen (NORCO) 5-325 mg per tablet Take 1 Tab by mouth every four (4) hours as needed for Pain.  TEMAZEPAM PO Take  by mouth as needed.  ropinirole HCl (REQUIP PO) Take  by mouth as needed.  levothyroxine (SYNTHROID) 112 mcg tablet Take 112 mcg by mouth Daily (before breakfast).  lipase-protease-amylase (CREON) 12,000-38,000 -60,000 unit capsule Take  by mouth three (3) times daily (with meals).  loperamide (IMODIUM) 2 mg capsule Take  by mouth.  buPROPion XL (WELLBUTRIN XL) 150 mg tablet Take 150 mg by mouth every morning.  DULoxetine (CYMBALTA) 20 mg capsule Take 20 mg by mouth daily.  Cetirizine 10 mg cap Take  by mouth.  albuterol-ipratropium (DUO-NEB) 2.5 mg-0.5 mg/3 ml nebu 3 mL by Nebulization route.  albuterol (PROVENTIL HFA, VENTOLIN HFA, PROAIR HFA) 90 mcg/actuation inhaler Take 1 Puff by inhalation every four (4) hours as needed for Wheezing.  1 Inhaler 1  
  nystatin-triamcinolone (MYCOLOG II) topical cream Apply  to affected area three (3) times daily. 30 g 2 Past History Past Medical History: 
Past Medical History:  
Diagnosis Date  Asthma  Depression  Emphysema of lung ([address]  Gastroparesis  Hypothyroidism  Malabsorption   
 of fat  Pancreatic insufficiency  Restless leg   
 bilateral  
 
 
Past Surgical History: 
Past Surgical History:  
Procedure Laterality Date [address] [place] [address] UNLISTED  HX HERNIA REPAIR    
 HX SMALL BOWEL RESECTION    
 intra aortic baloon pump  HX TUBAL LIGATION Family History: 
History reviewed. No pertinent family history. Social History: 
Social History Tobacco Use  Smoking status: Former Smoker  Smokeless tobacco: Never Used Substance Use Topics  Alcohol use: Never Frequency: Never  Drug use: Not Currently Allergies: 
No Known Allergies Review of Systems Review of Systems Constitutional: Negative for chills and fever. HENT: Negative for congestion and sore throat. Respiratory: Positive for cough, chest tightness and shortness of breath. Negative for wheezing. Cardiovascular: Negative for chest pain, palpitations and leg swelling. Gastrointestinal: Negative for abdominal distention, nausea and vomiting. Genitourinary: Negative for difficulty urinating, dysuria, flank pain, frequency, hematuria, urgency, vaginal bleeding and vaginal discharge. Musculoskeletal: Negative for arthralgias and joint swelling. Skin: Negative for rash and wound. Neurological: Negative for dizziness, weakness, light-headedness and headaches. Hematological: Negative for adenopathy. Physical Exam  
 
Vitals:  
 05/10/19 2121 05/10/19 2130 05/10/19 2215 05/10/19 2245 BP:  105/70 117/68 Pulse:  86 80 Resp:  21 21 Temp:      
SpO2: 100% 99% 99% 100% Weight:      
Height:      
 
Physical Exam  
 Constitutional: She is oriented to person, place, and time. She appears well-developed and well-nourished. No distress. Patient appears in no acute distress. HENT:  
Head: Normocephalic and atraumatic. Right Ear: External ear normal. No swelling or tenderness. Tympanic membrane is not perforated, not erythematous and not bulging. Left Ear: External ear normal. No swelling or tenderness. Tympanic membrane is not perforated, not erythematous and not bulging. Nose: Nose normal. No mucosal edema or rhinorrhea. Right sinus exhibits no maxillary sinus tenderness and no frontal sinus tenderness. Left sinus exhibits no maxillary sinus tenderness and no frontal sinus tenderness. Mouth/Throat: Uvula is midline, oropharynx is clear and moist and mucous membranes are normal. No oral lesions. No trismus in the jaw. No dental abscesses or uvula swelling. No oropharyngeal exudate, posterior oropharyngeal edema, posterior oropharyngeal erythema or tonsillar abscesses. Eyes: Conjunctivae are normal. Right eye exhibits no discharge. Left eye exhibits no discharge. No scleral icterus. Neck: Normal range of motion. Neck supple. Cardiovascular: Normal rate, regular rhythm, normal heart sounds and intact distal pulses. Exam reveals no gallop and no friction rub. No murmur heard. Pulmonary/Chest: Effort normal and breath sounds normal. No accessory muscle usage. No tachypnea. No respiratory distress. She has no decreased breath sounds. She has no wheezes. She has no rhonchi. She has no rales. Abdominal: Soft. She exhibits no distension. There is no tenderness. She has a midline abdominal scar which is what appears to be chronic poorly healing small ulceration proximal end of the scar. Musculoskeletal: Normal range of motion. She exhibits no edema or tenderness. Lymphadenopathy:  
  She has no cervical adenopathy. Neurological: She is alert and oriented to person, place, and time.  No cranial nerve deficit. Coordination normal.  
Skin: Skin is warm and dry. No rash noted. She is not diaphoretic. No erythema. Psychiatric: She has a normal mood and affect. Judgment normal.  
Nursing note and vitals reviewed. Diagnostic Study Results Labs - No results found for this or any previous visit (from the past 12 hour(s)). Radiologic Studies -  
CTA CHEST W OR W WO CONT Final Result IMPRESSION:  
  
1. No evidence of pulmonary embolism. 2.  Emphysema. 3. 1.3 cm nodular density superior segment left lower lobe associated with  
pleural thickening. No comparisons are available. This could be related to prior  
procedure. Consider PET scan evaluation does been no prior surgery. Short-term  
follow-up should be considered. XR CHEST PA LAT Final Result Impression:  
--------------  
  
Emphysema/COPD. No focal consolidation or pleural effusion. No significant interval change. CT Results  (Last 48 hours) None CXR Results  (Last 48 hours) None Medications given in the ED- Medications  
albuterol-ipratropium (DUO-NEB) 2.5 MG-0.5 MG/3 ML (3 mL Nebulization Given 5/10/19 5105) iopamidol (ISOVUE-370) 76 % injection 100 mL (100 mL IntraVENous Given 5/11/19 0053) methylPREDNISolone (PF) (Solu-MEDROL) injection 125 mg (125 mg IntraVENous Given 5/11/19 0240) Medical Decision Making I am the first provider for this patient. I reviewed the vital signs, available nursing notes, past medical history, past surgical history, family history and social history. Vital Signs-Reviewed the patient's vital signs. Pulse Oximetry Analysis - 100% on ra Cardiac Monitor: 
Rate: 80 bpm 
Rhythm: Sinus EKG interpretation: (Preliminary) 11:13 AM  
NSR, rate 84, Nl Qrs Records Reviewed: Nursing Notes and Old Medical Records Provider Notes (Medical Decision Making):  
 
 The patient presents with short of breath with a differential diagnosis of COPD exacerbation, pneumonia, pneumothorax, PE, CHF, anxiety, hyperventilation, medication reaction Procedures: 
Procedures ED Course:  
11:13 AM Initial assessment performed. The patients presenting problems have been discussed, and they are in agreement with the care plan formulated and outlined with them. I have encouraged them to ask questions as they arise throughout their visit. Diagnosis and Disposition DISCHARGE NOTE: 
02:23 Young Villarreal  results have been reviewed with her. She has been counseled regarding her diagnosis, treatment, and plan. She verbally conveys understanding and agreement of the signs, symptoms, diagnosis, treatment and prognosis and additionally agrees to follow up as discussed. She also agrees with the care-plan and conveys that all of her questions have been answered. I have also provided discharge instructions for her that include: educational information regarding their diagnosis and treatment, and list of reasons why they would want to return to the ED prior to their follow-up appointment, should her condition change. She has been provided with education for proper emergency department utilization. CLINICAL IMPRESSION: 
 
1. COPD exacerbation ([address] 2. KAUR (dyspnea on exertion) PLAN: 
1. D/C Home 2. Discharge Medication List as of 5/11/2019  2:26 AM  
  
CONTINUE these medications which have NOT CHANGED Details HYDROcodone-acetaminophen (NORCO) 5-325 mg per tablet Take 1 Tab by mouth every four (4) hours as needed for Pain., Historical Med  
  
TEMAZEPAM PO Take  by mouth as needed., Historical Med  
  
ropinirole HCl (REQUIP PO) Take  by mouth as needed., Historical Med  
  
levothyroxine (SYNTHROID) 112 mcg tablet Take 112 mcg by mouth Daily (before breakfast). , Historical Med  
  
lipase-protease-amylase (CREON) 12,000-38,000 -60,000 unit capsule Take by mouth three (3) times daily (with meals). , Historical Med  
  
loperamide (IMODIUM) 2 mg capsule Take  by mouth., Historical Med  
  
buPROPion XL (WELLBUTRIN XL) 150 mg tablet Take 150 mg by mouth every morning., Historical Med DULoxetine (CYMBALTA) 20 mg capsule Take 20 mg by mouth daily. , Historical Med Cetirizine 10 mg cap Take  by mouth., Historical Med  
  
albuterol-ipratropium (DUO-NEB) 2.5 mg-0.5 mg/3 ml nebu 3 mL by Nebulization route., Historical Med  
  
albuterol (PROVENTIL HFA, VENTOLIN HFA, PROAIR HFA) 90 mcg/actuation inhaler Take 1 Puff by inhalation every four (4) hours as needed for Wheezing., Print, Disp-1 Inhaler, R-1  
  
nystatin-triamcinolone (MYCOLOG II) topical cream Apply  to affected area three (3) times daily. , Print, Disp-30 g, R-2 3. Follow-up Information Follow up With Specialties Details Why Contact Info Susie Mancera MD Pulmonary Disease Call in 2 days  [address] [facility] [address] [facility] 
[phone] [facility] EMERG[facility] Emergency Medicine  As needed [address] [place] [address] 
[phone] [facility]  In 2 days   [address] [place], [facility] [address] [facility] [place] [address] 
[phone]

## 2019-05-15 ENCOUNTER — HOSPITAL ENCOUNTER (OUTPATIENT)
Dept: WOUND CARE | Age: 69
Discharge: HOME | End: 2019-05-15
Attending: HOSPITALIST
Payer: MEDICARE

## 2019-05-15 VITALS
DIASTOLIC BLOOD PRESSURE: 78 MMHG | HEART RATE: 87 BPM | TEMPERATURE: 98 F | RESPIRATION RATE: 16 BRPM | WEIGHT: 97 LBS | OXYGEN SATURATION: 100 % | SYSTOLIC BLOOD PRESSURE: 139 MMHG | HEIGHT: 60 IN | BODY MASS INDEX: 19.04 KG/M2

## 2019-05-15 PROBLEM — K63.2 ENTEROCUTANEOUS FISTULA: Status: ACTIVE | Noted: 2019-05-15

## 2019-05-15 PROCEDURE — 99204 OFFICE O/P NEW MOD 45 MIN: CPT

## 2019-05-15 NOTE — WOUND CARE
[facility] Chief Complaint: 
Abdi Golden is a 76 y.o.  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 Referred by:  Dr. Gerda Leyden HPI:   She had multiple abdominal surgeries from 2017. She had bowel resection in Nov of 2017. She had bowel perforation and had surgery. [place] 2 May 2018. 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 She has redness around the opening. She is reffered to [facility] and is going to follow up at [facility] Wound caused by: prior multiple abdominal surgeries Current wound care: local wound care Offloading wound: n/a Appetite: good Wound associated pain: mild Diabetic: No 
Smoker: no 
 
 
Past Medical History:  
Diagnosis Date  Asthma  Depression  Emphysema of lung ([address]  Gastroparesis  Hypothyroidism  Malabsorption   
 of fat  Pancreatic insufficiency  Restless leg   
 bilateral  
  
Past Surgical History:  
Procedure Laterality Date [address] UNLISTED  HX HERNIA REPAIR    
 HX SMALL BOWEL RESECTION    
 intra aortic baloon pump  HX TUBAL LIGATION History reviewed. No pertinent family history. Social History Tobacco Use  Smoking status: Former Smoker  Smokeless tobacco: Never Used Substance Use Topics  Alcohol use: Never Frequency: Never Prior to Admission medications Medication Sig Start Date End Date Taking? Authorizing Provider  
methylPREDNISolone (MEDROL, WENDY,) 4 mg tablet Us as directed 5/11/19   Grady Hall MD  
azithromycin (ZITHROMAX Z-WENDY) 250 mg tablet Take two tablets today then one tablet daily 5/11/19   Georgette Magallanes MD  
HYDROcodone-acetaminophen (NORCO) 5-325 mg per tablet Take 1 Tab by mouth every four (4) hours as needed for Pain. Aaron Ayala MD  
TEMAZEPAM PO Take  by mouth as needed.     Aaron Ayala MD  
 ropinirole HCl (REQUIP PO) Take  by mouth as needed. Aaron Ayala MD  
levothyroxine (SYNTHROID) 112 mcg tablet Take 112 mcg by mouth Daily (before breakfast). Aaron Ayala MD  
nystatin-triamcinolone (MYCOLOG II) topical cream Apply  to affected area three (3) times daily. 4/21/19   KATHLEEN Sorenson  
lipase-protease-amylase (CREON) 12,000-38,000 -60,000 unit capsule Take  by mouth three (3) times daily (with meals). Aaron Ayala MD  
loperamide (IMODIUM) 2 mg capsule Take  by mouth. Aaron Ayala MD  
buPROPion XL (WELLBUTRIN XL) 150 mg tablet Take 150 mg by mouth every morning. Aaron Ayala MD  
DULoxetine (CYMBALTA) 20 mg capsule Take 20 mg by mouth daily. Aaron Ayala MD  
Cetirizine 10 mg cap Take  by mouth. Aaron Ayala MD  
albuterol-ipratropium (DUO-NEB) 2.5 mg-0.5 mg/3 ml nebu 3 mL by Nebulization route. Aaron Ayala MD  
albuterol (PROVENTIL HFA, VENTOLIN HFA, PROAIR HFA) 90 mcg/actuation inhaler Take 1 Puff by inhalation every four (4) hours as needed for Wheezing. 4/19/19   Guevara Swanson MD  
 
No Known Allergies Review of Systems Gen: No fever, chills, malaise, weight loss/gain. Heent: No headache, rhinorrhea, epistaxis, ear pain, hearing loss, sinus pain, neck pain/stiffness, sore throat. Heart: No chest pain, palpitations, KAUR, pnd, or orthopnea. Resp: No cough, hemoptysis, wheezing and shortness of breath. GI: No nausea, vomiting, diarrhea, constipation, melena or hematochezia. : No urinary obstruction, dysuria or hematuria. Derm: see above Musc/skeletal: no bone or joint complains. Vasc: No edema, cyanosis or claudication. Endo: No heat/cold intolerance, no polyuria,polydipsia or polyphagia. Neuro: No unilateral weakness, numbness, tingling. No seizures. Heme: No easy bruising or bleeding. Objective:  
 
Physical Exam:  
 
Visit Vitals /78 (BP 1 Location: Right arm, BP Patient Position: Sitting) Pulse 87 Temp 98 °F (36.7 °C) Resp 16 Ht 5' (1.524 m) Wt 44 kg (97 lb) SpO2 100% Breastfeeding? No  
BMI 18.94 kg/m² General: well developed, well nourished, pleasant , NAD. Hygiene good Psych: cooperative. Pleasant. No anxiety or depression. Normal mood and affect. Neuro: alert and oriented to person/place/situation. Otherwise nonfocal. 
Derm: Skin turgor for age, dry skin HEENT: Normocephalic, atraumatic. EOMI. Conjunctiva clear. No scleral icterus. Neck: Normal range of motion. No masses. Chest: Good air entry bilaterally. Respirations nonlabored Cardio[de-identified] Normal heart sounds,no rubs, murmurs or gallops Abdomen: Soft, nontender, nondistended, normoactive bowel sounds. Lower extremities: color normal; temperature normal. Calves are supple, nontender. Capillary refill <3 sec Enterocutaneous fistula above umbilicus, redness around the opening Wound Description: Wound Length:   
 
Wound Width :  
 
Wound Depth :  
 
 
Data Review: No results found for this or any previous visit (from the past 24 hour(s)). Assessment:  
 
Patient Active Problem List  
Diagnosis Code  Enterocutaneous fistula K60.3  
 
76 y.o. female with enterocutaneous fistula,  
I have discussed with her that her definite treatment is surgery. She has redness around the opening likely from acid release from secretion. Will apply barrier cream  
 
Needs : 
 
Good local wound care Nutrition optimization Plan:  
 
proshield around the fistula Patient to return for wound care in:  14 Days Follow up with Nurse visit as recommended. PLEASE CONTACT OFFICE AS SOON AS POSSIBLE IF UNABLE TO MAKE THIS APPOINTMENT. Inspect your wounds, looking for signs of infection which may include the following:  Increase in redness  Red \"streaks\" from wound  Increase in swelling  Fever  Unusual odor  Change in the amount of wound drainage.  Should you experience any significant changes in your wound(s) or have any questions regarding your home care instructions please contact the wound center or your home health company. If after regular business hours, please call your family doctor or local emergency room. Signed By: Matty Mendez MD   
 May 15, 2019

## 2019-05-15 NOTE — WOUND CARE
05/15/19 1501 Wound Abdomen Date First Assessed/Time First Assessed: 05/15/19 1501   Present on Hospital Admission: Yes  Primary Wound Type: Fistula  Location: Abdomen Dressing Status Breakthrough drainage Dressing Type Absorptive Non-staged Wound Description Full thickness Wound Length (cm) 3.5 cm Wound Width (cm) 3.2 cm Wound Depth (cm) 0.1 cm 
(0.1) Wound Volume (cm^3) 1.12 cm^3 Condition of Base Pink Condition of Edges Open Assessment Red;Painful Tunneling (Depth unknown due to too small opening to measure) Drainage Amount Copious Drainage Color Tan Wound Odor None Cleansing and Cleansing Agents  Other (comment); Soap and water (vashe) Dressing Changed Changed/New Dressing Type Applied Other (Comment) 
(Marathon skin protectant, Proshield, Exu-dry, Hypafix) Procedure Tolerated Well

## 2019-06-13 ENCOUNTER — HOSPITAL ENCOUNTER (EMERGENCY)
Age: 69
Discharge: HOME | End: 2019-06-13
Attending: EMERGENCY MEDICINE
Payer: MEDICARE

## 2019-06-13 VITALS
WEIGHT: 97 LBS | SYSTOLIC BLOOD PRESSURE: 126 MMHG | BODY MASS INDEX: 19.04 KG/M2 | TEMPERATURE: 97.8 F | HEIGHT: 60 IN | HEART RATE: 82 BPM | RESPIRATION RATE: 20 BRPM | OXYGEN SATURATION: 100 % | DIASTOLIC BLOOD PRESSURE: 80 MMHG

## 2019-06-13 DIAGNOSIS — J44.1 COPD EXACERBATION (HCC): Primary | ICD-10-CM

## 2019-06-13 PROCEDURE — 74011250636 HC RX REV CODE- 250/636: Performed by: EMERGENCY MEDICINE

## 2019-06-13 PROCEDURE — 74011000250 HC RX REV CODE- 250: Performed by: EMERGENCY MEDICINE

## 2019-06-13 PROCEDURE — 99282 EMERGENCY DEPT VISIT SF MDM: CPT

## 2019-06-13 PROCEDURE — 94640 AIRWAY INHALATION TREATMENT: CPT

## 2019-06-13 PROCEDURE — 96372 THER/PROPH/DIAG INJ SC/IM: CPT

## 2019-06-13 RX ORDER — METHYLPREDNISOLONE 4 MG/1
TABLET ORAL
Qty: 1 DOSE PACK | Refills: 0 | Status: SHIPPED | OUTPATIENT
Start: 2019-06-13 | End: 2019-09-29

## 2019-06-13 RX ORDER — IPRATROPIUM BROMIDE AND ALBUTEROL SULFATE 2.5; .5 MG/3ML; MG/3ML
3 SOLUTION RESPIRATORY (INHALATION) ONCE
Status: COMPLETED | OUTPATIENT
Start: 2019-06-13 | End: 2019-06-13

## 2019-06-13 RX ADMIN — IPRATROPIUM BROMIDE AND ALBUTEROL SULFATE 3 ML: .5; 3 SOLUTION RESPIRATORY (INHALATION) at 22:51

## 2019-06-13 RX ADMIN — METHYLPREDNISOLONE SODIUM SUCCINATE 125 MG: 125 INJECTION, POWDER, FOR SOLUTION INTRAMUSCULAR; INTRAVENOUS at 22:47

## 2019-06-14 NOTE — DISCHARGE INSTRUCTIONS

## 2019-06-14 NOTE — ED PROVIDER NOTES
EMERGENCY DEPARTMENT HISTORY AND PHYSICAL EXAM    Date: 6/13/2019  Patient Name: Steph Vargas    History of Presenting Illness     Chief Complaint   Patient presents with    Shortness of Breath         HPI:   10:41 PM  Steph Vargas is a 76 y.o. female with PMHX of COPD and asthma, gastroparesis, pancreatic insufficiency, hypothyroidism, depression and restless leg syndrome who presents to the emergency department C/O asthma attack. Patient states she began having a bad dry cough since this morning. Cough is not productive and is associated with shortness of breath and wheezing. She has no chest pain, fever, nausea or vomiting. Patient states she uses a nebulizer machine at home but she did not get any relief. .     PCP: Alma Noguera MD    Current Outpatient Medications   Medication Sig Dispense Refill    methylPREDNISolone (MEDROL, WENDY,) 4 mg tablet Us as directed 1 Dose Pack 0    azithromycin (ZITHROMAX Z-WENDY) 250 mg tablet Take two tablets today then one tablet daily 6 Tab 0    HYDROcodone-acetaminophen (NORCO) 5-325 mg per tablet Take 1 Tab by mouth every four (4) hours as needed for Pain.  TEMAZEPAM PO Take  by mouth as needed.  ropinirole HCl (REQUIP PO) Take  by mouth as needed.  levothyroxine (SYNTHROID) 112 mcg tablet Take 112 mcg by mouth Daily (before breakfast).  nystatin-triamcinolone (MYCOLOG II) topical cream Apply  to affected area three (3) times daily. 30 g 2    lipase-protease-amylase (CREON) 12,000-38,000 -60,000 unit capsule Take  by mouth three (3) times daily (with meals).  loperamide (IMODIUM) 2 mg capsule Take  by mouth.  buPROPion XL (WELLBUTRIN XL) 150 mg tablet Take 150 mg by mouth every morning.  DULoxetine (CYMBALTA) 20 mg capsule Take 20 mg by mouth daily.  Cetirizine 10 mg cap Take  by mouth.  albuterol-ipratropium (DUO-NEB) 2.5 mg-0.5 mg/3 ml nebu 3 mL by Nebulization route.       albuterol (PROVENTIL HFA, VENTOLIN HFA, PROAIR HFA) 90 mcg/actuation inhaler Take 1 Puff by inhalation every four (4) hours as needed for Wheezing. 1 Inhaler 1       Past History     Past Medical History:  Past Medical History:   Diagnosis Date    Asthma     Depression     Emphysema of lung (HCC)     Gastroparesis     Hypothyroidism     Malabsorption     of fat    Pancreatic insufficiency     Restless leg     bilateral       Past Surgical History:  Past Surgical History:   Procedure Laterality Date    ABDOMEN SURGERY PROC UNLISTED      HX HERNIA REPAIR      HX SMALL BOWEL RESECTION      intra aortic baloon pump    HX TUBAL LIGATION         Family History:  History reviewed. No pertinent family history. Social History:  Social History     Tobacco Use    Smoking status: Former Smoker    Smokeless tobacco: Never Used   Substance Use Topics    Alcohol use: Never     Frequency: Never    Drug use: Not Currently       Allergies:  No Known Allergies      Review of Systems   Review of Systems   Constitutional: Negative for chills and fever. HENT: Negative for congestion and sore throat. Respiratory: Positive for cough, shortness of breath and wheezing. Cardiovascular: Negative for chest pain. Gastrointestinal: Negative for abdominal distention, nausea and vomiting. Genitourinary: Negative for difficulty urinating, dysuria, flank pain, frequency, hematuria, urgency, vaginal bleeding and vaginal discharge. Musculoskeletal: Negative for arthralgias and joint swelling. Skin: Negative for rash and wound. Neurological: Negative for dizziness, weakness, light-headedness and headaches. Hematological: Negative for adenopathy. Physical Exam     Vitals:    06/13/19 2227   BP: 126/80   Pulse: 82   Resp: 20   Temp: 97.8 °F (36.6 °C)   SpO2: 100%   Weight: 44 kg (97 lb)   Height: 5' (1.524 m)     Physical Exam   Constitutional: She is oriented to person, place, and time. She appears well-developed and well-nourished. No distress. Chronically ill looking female in no distress. She arrived with coffee cup in her hand. HENT:   Head: Normocephalic and atraumatic. Right Ear: External ear normal. No swelling or tenderness. Tympanic membrane is not perforated, not erythematous and not bulging. Left Ear: External ear normal. No swelling or tenderness. Tympanic membrane is not perforated, not erythematous and not bulging. Nose: Nose normal. No mucosal edema or rhinorrhea. Right sinus exhibits no maxillary sinus tenderness and no frontal sinus tenderness. Left sinus exhibits no maxillary sinus tenderness and no frontal sinus tenderness. Mouth/Throat: Uvula is midline, oropharynx is clear and moist and mucous membranes are normal. No oral lesions. No trismus in the jaw. No dental abscesses or uvula swelling. No oropharyngeal exudate, posterior oropharyngeal edema, posterior oropharyngeal erythema or tonsillar abscesses. Eyes: Conjunctivae are normal. Right eye exhibits no discharge. Left eye exhibits no discharge. No scleral icterus. Neck: Normal range of motion. Neck supple. Cardiovascular: Normal rate, regular rhythm, normal heart sounds and intact distal pulses. Exam reveals no gallop and no friction rub. No murmur heard. Pulmonary/Chest: Effort normal and breath sounds normal. No accessory muscle usage. No tachypnea. No respiratory distress. She has no decreased breath sounds. She has no wheezes. She has no rhonchi. She has no rales. Lungs are clear without any wheezing. Abdominal: Soft. She exhibits no distension. There is no tenderness. Musculoskeletal: Normal range of motion. She exhibits no edema or tenderness. Lymphadenopathy:     She has no cervical adenopathy. Neurological: She is alert and oriented to person, place, and time. No cranial nerve deficit. Skin: Skin is warm and dry. No rash noted. She is not diaphoretic. No erythema. Psychiatric: She has a normal mood and affect.  Judgment normal.   Nursing note and vitals reviewed. Diagnostic Study Results     Labs -   No results found for this or any previous visit (from the past 12 hour(s)). Radiologic Studies -   No orders to display     CT Results  (Last 48 hours)    None        CXR Results  (Last 48 hours)    None          Medications given in the ED-  Medications   methylPREDNISolone (PF) (Solu-MEDROL) injection 125 mg (125 mg IntraMUSCular Given 6/13/19 4255)   albuterol-ipratropium (DUO-NEB) 2.5 MG-0.5 MG/3 ML (3 mL Nebulization Given 6/13/19 5131)         Medical Decision Making   I am the first provider for this patient. I reviewed the vital signs, available nursing notes, past medical history, past surgical history, family history and social history. Vital Signs-Reviewed the patient's vital signs  Pulse Oximetry Analysis - 100% on RA     Cardiac Monitor:  Rate: 82 bpm  Rhythm: Sinus      Records Reviewed: Nursing Notes and Old Medical Records    Provider Notes (Medical Decision Making):   Patient with long history of COPD. She presents complaining of asthma attack. She however appears stable. Her pulse ox is 100% on room air and she is not wheezing. She is asking for Solu-Medrol shot and a nebulizer treatment. Will discharge home with Medrol Dosepak and follow-up with PCP. Procedures:  Procedures    ED Course:   10:41 PM Initial assessment performed. The patients presenting problems have been discussed, and they are in agreement with the care plan formulated and outlined with them. I have encouraged them to ask questions as they arise throughout their visit. Diagnosis and Disposition       DISCHARGE NOTE:  10:55 PM    Vannessa Barker's  results have been reviewed with her. She has been counseled regarding her diagnosis, treatment, and plan. She verbally conveys understanding and agreement of the signs, symptoms, diagnosis, treatment and prognosis and additionally agrees to follow up as discussed.   She also agrees with the care-plan and conveys that all of her questions have been answered. I have also provided discharge instructions for her that include: educational information regarding their diagnosis and treatment, and list of reasons why they would want to return to the ED prior to their follow-up appointment, should her condition change. She has been provided with education for proper emergency department utilization. CLINICAL IMPRESSION:    1. COPD exacerbation ([address]        PLAN:  1. D/C Home  2. Current Discharge Medication List      CONTINUE these medications which have CHANGED    Details   methylPREDNISolone (MEDROL, WENDY,) 4 mg tablet Us as directed  Qty: 1 Dose Pack, Refills: 0           3.    Follow-up Information     Follow up With Specialties Details Why Contact Info    Krystal Shin MD Family Practice In 1 day  [address]  [address] [place] [address]  [phone]      [facility] EMERGENCY DEPT Emergency Medicine  As needed [address] [address] [phone]

## 2019-06-14 NOTE — ED TRIAGE NOTES
Patient with complaints of cough and shortness of breath.  Patient states that her asthma has been acting up

## 2019-06-14 NOTE — ED NOTES
Patient is ambulatory to ED bed 1 with c/o increased SOB since this morning. Patient states she did not call her primary care physician because she did not have a ride. Patient is able to formulate sentences and make needs known. No acute respiratory distress noted.

## 2019-06-14 NOTE — ED NOTES
Reviewed discharge instructions and medications with patient. Patient verbalized understanding of instructions. All questions answered    Armband removed and shredded. Patient ambulatory to waiting room to wait for [employer] cab.

## 2019-08-31 ENCOUNTER — APPOINTMENT (OUTPATIENT)
Dept: CT IMAGING | Age: 69
End: 2019-08-31
Attending: EMERGENCY MEDICINE
Payer: MEDICARE

## 2019-08-31 ENCOUNTER — APPOINTMENT (OUTPATIENT)
Dept: GENERAL RADIOLOGY | Age: 69
End: 2019-08-31
Attending: EMERGENCY MEDICINE
Payer: MEDICARE

## 2019-08-31 ENCOUNTER — HOSPITAL ENCOUNTER (OUTPATIENT)
Age: 69
Setting detail: OBSERVATION
Discharge: HOME | End: 2019-09-01
Attending: EMERGENCY MEDICINE | Admitting: INTERNAL MEDICINE
Payer: MEDICARE

## 2019-08-31 DIAGNOSIS — N12 PYELONEPHRITIS: Primary | ICD-10-CM

## 2019-08-31 DIAGNOSIS — J44.1 COPD EXACERBATION (HCC): ICD-10-CM

## 2019-08-31 DIAGNOSIS — R91.1 LUNG NODULE: ICD-10-CM

## 2019-08-31 LAB
ALBUMIN SERPL-MCNC: 2.7 G/DL (ref 3.4–5)
ALBUMIN/GLOB SERPL: 0.8 {RATIO} (ref 0.8–1.7)
ALP SERPL-CCNC: 173 U/L (ref 45–117)
ALT SERPL-CCNC: 26 U/L (ref 13–56)
ANION GAP SERPL CALC-SCNC: 5 MMOL/L (ref 3–18)
APPEARANCE UR: CLEAR
AST SERPL-CCNC: 28 U/L (ref 10–38)
BACTERIA URNS QL MICRO: ABNORMAL /HPF
BASOPHILS # BLD: 0.1 K/UL (ref 0–0.1)
BASOPHILS NFR BLD: 1 % (ref 0–2)
BILIRUB SERPL-MCNC: 0.3 MG/DL (ref 0.2–1)
BILIRUB UR QL: NEGATIVE
BUN SERPL-MCNC: 16 MG/DL (ref 7–18)
BUN/CREAT SERPL: 29 (ref 12–20)
CALCIUM SERPL-MCNC: 8.7 MG/DL (ref 8.5–10.1)
CHLORIDE SERPL-SCNC: 110 MMOL/L (ref 100–111)
CK MB CFR SERPL CALC: NORMAL % (ref 0–4)
CK MB SERPL-MCNC: <1 NG/ML (ref 5–25)
CK SERPL-CCNC: 47 U/L (ref 26–192)
CO2 SERPL-SCNC: 26 MMOL/L (ref 21–32)
COLOR UR: YELLOW
CREAT SERPL-MCNC: 0.56 MG/DL (ref 0.6–1.3)
DIFFERENTIAL METHOD BLD: ABNORMAL
EOSINOPHIL # BLD: 1 K/UL (ref 0–0.4)
EOSINOPHIL NFR BLD: 10 % (ref 0–5)
EPITH CASTS URNS QL MICRO: ABNORMAL /LPF (ref 0–5)
ERYTHROCYTE [DISTWIDTH] IN BLOOD BY AUTOMATED COUNT: 16.2 % (ref 11.6–14.5)
GLOBULIN SER CALC-MCNC: 3.6 G/DL (ref 2–4)
GLUCOSE SERPL-MCNC: 79 MG/DL (ref 74–99)
GLUCOSE UR STRIP.AUTO-MCNC: NEGATIVE MG/DL
HCT VFR BLD AUTO: 37.8 % (ref 35–45)
HGB BLD-MCNC: 12.1 G/DL (ref 12–16)
HGB UR QL STRIP: NEGATIVE
KETONES UR QL STRIP.AUTO: ABNORMAL MG/DL
LACTATE BLD-SCNC: 1.16 MMOL/L (ref 0.4–2)
LEUKOCYTE ESTERASE UR QL STRIP.AUTO: ABNORMAL
LYMPHOCYTES # BLD: 2.5 K/UL (ref 0.9–3.6)
LYMPHOCYTES NFR BLD: 25 % (ref 21–52)
MCH RBC QN AUTO: 27.5 PG (ref 24–34)
MCHC RBC AUTO-ENTMCNC: 32 G/DL (ref 31–37)
MCV RBC AUTO: 85.9 FL (ref 74–97)
MONOCYTES # BLD: 0.9 K/UL (ref 0.05–1.2)
MONOCYTES NFR BLD: 9 % (ref 3–10)
NEUTS SEG # BLD: 5.5 K/UL (ref 1.8–8)
NEUTS SEG NFR BLD: 55 % (ref 40–73)
NITRITE UR QL STRIP.AUTO: NEGATIVE
PH UR STRIP: 6.5 [PH] (ref 5–8)
PLATELET # BLD AUTO: 306 K/UL (ref 135–420)
PMV BLD AUTO: 9.5 FL (ref 9.2–11.8)
POTASSIUM SERPL-SCNC: 4.3 MMOL/L (ref 3.5–5.5)
PROT SERPL-MCNC: 6.3 G/DL (ref 6.4–8.2)
PROT UR STRIP-MCNC: NEGATIVE MG/DL
RBC # BLD AUTO: 4.4 M/UL (ref 4.2–5.3)
RBC #/AREA URNS HPF: ABNORMAL /HPF (ref 0–5)
SODIUM SERPL-SCNC: 141 MMOL/L (ref 136–145)
SP GR UR REFRACTOMETRY: >1.03 (ref 1–1.03)
TROPONIN I SERPL-MCNC: <0.02 NG/ML (ref 0–0.04)
UROBILINOGEN UR QL STRIP.AUTO: 0.2 EU/DL (ref 0.2–1)
WBC # BLD AUTO: 9.9 K/UL (ref 4.6–13.2)
WBC URNS QL MICRO: ABNORMAL /HPF (ref 0–5)

## 2019-08-31 PROCEDURE — 80053 COMPREHEN METABOLIC PANEL: CPT

## 2019-08-31 PROCEDURE — 71045 X-RAY EXAM CHEST 1 VIEW: CPT

## 2019-08-31 PROCEDURE — 96376 TX/PRO/DX INJ SAME DRUG ADON: CPT

## 2019-08-31 PROCEDURE — 99285 EMERGENCY DEPT VISIT HI MDM: CPT

## 2019-08-31 PROCEDURE — 87040 BLOOD CULTURE FOR BACTERIA: CPT

## 2019-08-31 PROCEDURE — 96372 THER/PROPH/DIAG INJ SC/IM: CPT

## 2019-08-31 PROCEDURE — 87086 URINE CULTURE/COLONY COUNT: CPT

## 2019-08-31 PROCEDURE — 93005 ELECTROCARDIOGRAM TRACING: CPT

## 2019-08-31 PROCEDURE — 85025 COMPLETE CBC W/AUTO DIFF WBC: CPT

## 2019-08-31 PROCEDURE — 74011250637 HC RX REV CODE- 250/637: Performed by: EMERGENCY MEDICINE

## 2019-08-31 PROCEDURE — 74011636320 HC RX REV CODE- 636/320: Performed by: EMERGENCY MEDICINE

## 2019-08-31 PROCEDURE — 74011250636 HC RX REV CODE- 250/636: Performed by: EMERGENCY MEDICINE

## 2019-08-31 PROCEDURE — 81001 URINALYSIS AUTO W/SCOPE: CPT

## 2019-08-31 PROCEDURE — 99218 HC RM OBSERVATION: CPT

## 2019-08-31 PROCEDURE — 74011250636 HC RX REV CODE- 250/636: Performed by: INTERNAL MEDICINE

## 2019-08-31 PROCEDURE — 83605 ASSAY OF LACTIC ACID: CPT

## 2019-08-31 PROCEDURE — 87077 CULTURE AEROBIC IDENTIFY: CPT

## 2019-08-31 PROCEDURE — 74011000250 HC RX REV CODE- 250: Performed by: EMERGENCY MEDICINE

## 2019-08-31 PROCEDURE — 71275 CT ANGIOGRAPHY CHEST: CPT

## 2019-08-31 PROCEDURE — 96375 TX/PRO/DX INJ NEW DRUG ADDON: CPT

## 2019-08-31 PROCEDURE — 96361 HYDRATE IV INFUSION ADD-ON: CPT

## 2019-08-31 PROCEDURE — 87186 SC STD MICRODIL/AGAR DIL: CPT

## 2019-08-31 PROCEDURE — 96374 THER/PROPH/DIAG INJ IV PUSH: CPT

## 2019-08-31 PROCEDURE — 82550 ASSAY OF CK (CPK): CPT

## 2019-08-31 PROCEDURE — 74011250637 HC RX REV CODE- 250/637: Performed by: INTERNAL MEDICINE

## 2019-08-31 RX ORDER — ACETAMINOPHEN 500 MG
500 TABLET ORAL
COMMUNITY

## 2019-08-31 RX ORDER — GUAIFENESIN 600 MG/1
1200 TABLET, EXTENDED RELEASE ORAL AS NEEDED
Status: ON HOLD | COMMUNITY
End: 2019-10-11 | Stop reason: SDUPTHER

## 2019-08-31 RX ORDER — LIDOCAINE 4 G/100G
1 PATCH TOPICAL
Status: COMPLETED | OUTPATIENT
Start: 2019-08-31 | End: 2019-09-01

## 2019-08-31 RX ORDER — ALBUTEROL SULFATE 90 UG/1
2 AEROSOL, METERED RESPIRATORY (INHALATION)
Status: DISCONTINUED | OUTPATIENT
Start: 2019-08-31 | End: 2019-09-01 | Stop reason: HOSPADM

## 2019-08-31 RX ORDER — LEVOTHYROXINE SODIUM 75 UG/1
112 TABLET ORAL
Status: DISCONTINUED | OUTPATIENT
Start: 2019-09-01 | End: 2019-08-31

## 2019-08-31 RX ORDER — KETOROLAC TROMETHAMINE 30 MG/ML
15 INJECTION, SOLUTION INTRAMUSCULAR; INTRAVENOUS ONCE
Status: COMPLETED | OUTPATIENT
Start: 2019-08-31 | End: 2019-08-31

## 2019-08-31 RX ORDER — TEMAZEPAM 15 MG/1
30 CAPSULE ORAL
Status: DISCONTINUED | OUTPATIENT
Start: 2019-08-31 | End: 2019-09-01 | Stop reason: HOSPADM

## 2019-08-31 RX ORDER — DULOXETIN HYDROCHLORIDE 60 MG/1
60 CAPSULE, DELAYED RELEASE ORAL DAILY
Status: DISCONTINUED | OUTPATIENT
Start: 2019-09-01 | End: 2019-09-01 | Stop reason: HOSPADM

## 2019-08-31 RX ORDER — IPRATROPIUM BROMIDE AND ALBUTEROL SULFATE 2.5; .5 MG/3ML; MG/3ML
3 SOLUTION RESPIRATORY (INHALATION)
Status: DISCONTINUED | OUTPATIENT
Start: 2019-08-31 | End: 2019-09-01 | Stop reason: HOSPADM

## 2019-08-31 RX ORDER — ACETAMINOPHEN 325 MG/1
650 TABLET ORAL ONCE
Status: COMPLETED | OUTPATIENT
Start: 2019-08-31 | End: 2019-08-31

## 2019-08-31 RX ORDER — GUAIFENESIN 600 MG/1
600 TABLET, EXTENDED RELEASE ORAL
Status: DISCONTINUED | OUTPATIENT
Start: 2019-08-31 | End: 2019-09-01 | Stop reason: HOSPADM

## 2019-08-31 RX ORDER — FLUTICASONE PROPIONATE AND SALMETEROL 500; 50 UG/1; UG/1
1 POWDER RESPIRATORY (INHALATION) EVERY 12 HOURS
COMMUNITY

## 2019-08-31 RX ORDER — MONTELUKAST SODIUM 10 MG/1
10 TABLET ORAL
Status: DISCONTINUED | OUTPATIENT
Start: 2019-08-31 | End: 2019-09-01 | Stop reason: HOSPADM

## 2019-08-31 RX ORDER — HEPARIN SODIUM 5000 [USP'U]/ML
5000 INJECTION, SOLUTION INTRAVENOUS; SUBCUTANEOUS EVERY 8 HOURS
Status: DISCONTINUED | OUTPATIENT
Start: 2019-08-31 | End: 2019-09-01 | Stop reason: HOSPADM

## 2019-08-31 RX ORDER — ROPINIROLE 0.25 MG/1
0.25 TABLET, FILM COATED ORAL AS NEEDED
Status: DISCONTINUED | OUTPATIENT
Start: 2019-08-31 | End: 2019-09-01 | Stop reason: HOSPADM

## 2019-08-31 RX ORDER — BUPROPION HYDROCHLORIDE 150 MG/1
150 TABLET ORAL
Status: DISCONTINUED | OUTPATIENT
Start: 2019-09-01 | End: 2019-09-01 | Stop reason: HOSPADM

## 2019-08-31 RX ORDER — HYDROCODONE BITARTRATE AND ACETAMINOPHEN 5; 325 MG/1; MG/1
1 TABLET ORAL
Status: COMPLETED | OUTPATIENT
Start: 2019-08-31 | End: 2019-08-31

## 2019-08-31 RX ORDER — ROPINIROLE 0.25 MG/1
0.25 TABLET, FILM COATED ORAL 3 TIMES DAILY
Status: DISCONTINUED | OUTPATIENT
Start: 2019-08-31 | End: 2019-08-31

## 2019-08-31 RX ORDER — FLUTICASONE FUROATE AND VILANTEROL 200; 25 UG/1; UG/1
1 POWDER RESPIRATORY (INHALATION) DAILY
Status: DISCONTINUED | OUTPATIENT
Start: 2019-09-01 | End: 2019-09-01 | Stop reason: HOSPADM

## 2019-08-31 RX ORDER — DULOXETIN HYDROCHLORIDE 20 MG/1
20 CAPSULE, DELAYED RELEASE ORAL DAILY
Status: DISCONTINUED | OUTPATIENT
Start: 2019-09-01 | End: 2019-08-31

## 2019-08-31 RX ORDER — OXYCODONE AND ACETAMINOPHEN 7.5; 325 MG/1; MG/1
1 TABLET ORAL
Status: DISCONTINUED | OUTPATIENT
Start: 2019-08-31 | End: 2019-09-01 | Stop reason: HOSPADM

## 2019-08-31 RX ORDER — LOPERAMIDE HYDROCHLORIDE 2 MG/1
2 CAPSULE ORAL 4 TIMES DAILY
Status: DISCONTINUED | OUTPATIENT
Start: 2019-08-31 | End: 2019-09-01

## 2019-08-31 RX ORDER — DIPHENHYDRAMINE HCL 25 MG
25 TABLET ORAL
COMMUNITY
End: 2019-11-11

## 2019-08-31 RX ADMIN — ACETAMINOPHEN 650 MG: 325 TABLET ORAL at 18:01

## 2019-08-31 RX ADMIN — MONTELUKAST 10 MG: 10 TABLET, FILM COATED ORAL at 22:34

## 2019-08-31 RX ADMIN — IOPAMIDOL 100 ML: 755 INJECTION, SOLUTION INTRAVENOUS at 15:46

## 2019-08-31 RX ADMIN — CEFTRIAXONE 1 G: 1 INJECTION, POWDER, FOR SOLUTION INTRAMUSCULAR; INTRAVENOUS at 18:07

## 2019-08-31 RX ADMIN — SODIUM CHLORIDE 1000 ML: 900 INJECTION, SOLUTION INTRAVENOUS at 17:58

## 2019-08-31 RX ADMIN — HEPARIN SODIUM 5000 UNITS: 5000 INJECTION INTRAVENOUS; SUBCUTANEOUS at 19:34

## 2019-08-31 RX ADMIN — BENZOCAINE AND MENTHOL 1 LOZENGE: 15; 3.6 LOZENGE ORAL at 22:33

## 2019-08-31 RX ADMIN — HYDROCODONE BITARTRATE AND ACETAMINOPHEN 1 TABLET: 5; 325 TABLET ORAL at 13:52

## 2019-08-31 RX ADMIN — KETOROLAC TROMETHAMINE 15 MG: 30 INJECTION, SOLUTION INTRAMUSCULAR; INTRAVENOUS at 17:59

## 2019-08-31 RX ADMIN — METHYLPREDNISOLONE SODIUM SUCCINATE 40 MG: 40 INJECTION, POWDER, FOR SOLUTION INTRAMUSCULAR; INTRAVENOUS at 22:35

## 2019-08-31 RX ADMIN — METHYLPREDNISOLONE SODIUM SUCCINATE 125 MG: 125 INJECTION, POWDER, FOR SOLUTION INTRAMUSCULAR; INTRAVENOUS at 13:40

## 2019-08-31 NOTE — ED NOTES
Pt c/o pain, requesting pain meds, Dr. Amie Art discussed admission vs discharge with pt. Pt states she would feel better if she could be admitted.

## 2019-08-31 NOTE — ED PROVIDER NOTES
EMERGENCY DEPARTMENT HISTORY AND PHYSICAL EXAM    Date: 8/31/2019  Patient Name: Michelle Chahal    History of Presenting Illness     Chief Complaint   Patient presents with    Cough    Back Pain         History Provided By: Patient    12:57 PM  Michelle Chahal is a 71 y.o. female with PMHX of emphysema who presents to the emergency department C/O cough and shortness of breath for the past 5 days and now with left-sided thoracic back pain. She denies any fever, swelling of her legs, abdominal pain, other complaints. States this feels similar to prior episodes of emphysema. States she used her nebulizer just prior to arrival.     PCP: Irina Monsalve MD    Current Facility-Administered Medications   Medication Dose Route Frequency Provider Last Rate Last Dose    lidocaine 4 % patch 1 Patch  1 Patch TransDERmal NOW Anest, Jelena Shaikh MD   1 Patch at 08/31/19 1802     Current Outpatient Medications   Medication Sig Dispense Refill    methylPREDNISolone (MEDROL, WENDY,) 4 mg tablet Us as directed 1 Dose Pack 0    azithromycin (ZITHROMAX Z-WENDY) 250 mg tablet Take two tablets today then one tablet daily 6 Tab 0    HYDROcodone-acetaminophen (NORCO) 5-325 mg per tablet Take 1 Tab by mouth every four (4) hours as needed for Pain.  TEMAZEPAM PO Take  by mouth as needed.  ropinirole HCl (REQUIP PO) Take  by mouth as needed.  levothyroxine (SYNTHROID) 112 mcg tablet Take 112 mcg by mouth Daily (before breakfast).  nystatin-triamcinolone (MYCOLOG II) topical cream Apply  to affected area three (3) times daily. 30 g 2    lipase-protease-amylase (CREON) 12,000-38,000 -60,000 unit capsule Take  by mouth three (3) times daily (with meals).  loperamide (IMODIUM) 2 mg capsule Take  by mouth.  buPROPion XL (WELLBUTRIN XL) 150 mg tablet Take 150 mg by mouth every morning.  DULoxetine (CYMBALTA) 20 mg capsule Take 20 mg by mouth daily.  Cetirizine 10 mg cap Take  by mouth.       albuterol-ipratropium (DUO-NEB) 2.5 mg-0.5 mg/3 ml nebu 3 mL by Nebulization route.  albuterol (PROVENTIL HFA, VENTOLIN HFA, PROAIR HFA) 90 mcg/actuation inhaler Take 1 Puff by inhalation every four (4) hours as needed for Wheezing. 1 Inhaler 1       Past History     Past Medical History:  Past Medical History:   Diagnosis Date    Asthma     Depression     Emphysema of lung (HCC)     Gastroparesis     Hypothyroidism     Malabsorption     of fat    Pancreatic insufficiency     Restless leg     bilateral       Past Surgical History:  Past Surgical History:   Procedure Laterality Date    ABDOMEN SURGERY PROC UNLISTED      HX HERNIA REPAIR      HX SMALL BOWEL RESECTION      intra aortic baloon pump    HX TUBAL LIGATION         Family History:  History reviewed. No pertinent family history. Social History:  Social History     Tobacco Use    Smoking status: Former Smoker    Smokeless tobacco: Never Used   Substance Use Topics    Alcohol use: Never     Frequency: Never    Drug use: Not Currently       Allergies:  No Known Allergies      Review of Systems   Review of Systems   Constitutional: Negative for fever. Respiratory: Positive for cough and shortness of breath. Cardiovascular: Positive for chest pain. Gastrointestinal: Negative for abdominal pain. Musculoskeletal: Positive for back pain. All other systems reviewed and are negative.         Physical Exam     Vitals:    08/31/19 1354 08/31/19 1430 08/31/19 1630 08/31/19 1712   BP: (!) 151/92 (!) 156/94 139/85 142/90   Pulse: 96 87 99 97   Resp: 23 20 23 20   Temp:    98.3 °F (36.8 °C)   SpO2: 96% 100% 97% 97%   Weight:       Height:         Physical Exam    Nursing notes and vital signs reviewed    Constitutional: Elderly and thin appearing, mild distress  Head: Normocephalic, Atraumatic  Eyes: EOMI  Neck: Supple  Cardiovascular: Regular rate and rhythm, no murmurs, rubs, or gallops  Chest: Normal work of breathing and chest excursion bilaterally  Lungs: Diminished to ausculation bilaterally  Back: No evidence of trauma or deformity  Extremities: No evidence of trauma or deformity, no LE edema  Skin: Warm and dry, normal cap refill  Neuro: Alert and appropriate  Psychiatric: Normal mood and affect      Diagnostic Study Results     Labs -     Recent Results (from the past 12 hour(s))   EKG, 12 LEAD, INITIAL    Collection Time: 08/31/19  1:14 PM   Result Value Ref Range    Ventricular Rate 105 BPM    Atrial Rate 105 BPM    P-R Interval 172 ms    QRS Duration 70 ms    Q-T Interval 308 ms    QTC Calculation (Bezet) 407 ms    Calculated P Axis 71 degrees    Calculated R Axis 57 degrees    Calculated T Axis 64 degrees    Diagnosis       Sinus tachycardia  Possible Left atrial enlargement  Borderline ECG  When compared with ECG of 10-MAY-2019 21:12,  No significant change was found     CBC WITH AUTOMATED DIFF    Collection Time: 08/31/19  1:40 PM   Result Value Ref Range    WBC 9.9 4.6 - 13.2 K/uL    RBC 4.40 4.20 - 5.30 M/uL    HGB 12.1 12.0 - 16.0 g/dL    HCT 37.8 35.0 - 45.0 %    MCV 85.9 74.0 - 97.0 FL    MCH 27.5 24.0 - 34.0 PG    MCHC 32.0 31.0 - 37.0 g/dL    RDW 16.2 (H) 11.6 - 14.5 %    PLATELET 790 369 - 263 K/uL    MPV 9.5 9.2 - 11.8 FL    NEUTROPHILS 55 40 - 73 %    LYMPHOCYTES 25 21 - 52 %    MONOCYTES 9 3 - 10 %    EOSINOPHILS 10 (H) 0 - 5 %    BASOPHILS 1 0 - 2 %    ABS. NEUTROPHILS 5.5 1.8 - 8.0 K/UL    ABS. LYMPHOCYTES 2.5 0.9 - 3.6 K/UL    ABS. MONOCYTES 0.9 0.05 - 1.2 K/UL    ABS. EOSINOPHILS 1.0 (H) 0.0 - 0.4 K/UL    ABS.  BASOPHILS 0.1 0.0 - 0.1 K/UL    DF AUTOMATED     METABOLIC PANEL, COMPREHENSIVE    Collection Time: 08/31/19  1:40 PM   Result Value Ref Range    Sodium 141 136 - 145 mmol/L    Potassium 4.3 3.5 - 5.5 mmol/L    Chloride 110 100 - 111 mmol/L    CO2 26 21 - 32 mmol/L    Anion gap 5 3.0 - 18 mmol/L    Glucose 79 74 - 99 mg/dL    BUN 16 7.0 - 18 MG/DL    Creatinine 0.56 (L) 0.6 - 1.3 MG/DL    BUN/Creatinine ratio 29 (H) 12 - 20      GFR est AA >60 >60 ml/min/1.73m2    GFR est non-AA >60 >60 ml/min/1.73m2    Calcium 8.7 8.5 - 10.1 MG/DL    Bilirubin, total 0.3 0.2 - 1.0 MG/DL    ALT (SGPT) 26 13 - 56 U/L    AST (SGOT) 28 10 - 38 U/L    Alk. phosphatase 173 (H) 45 - 117 U/L    Protein, total 6.3 (L) 6.4 - 8.2 g/dL    Albumin 2.7 (L) 3.4 - 5.0 g/dL    Globulin 3.6 2.0 - 4.0 g/dL    A-G Ratio 0.8 0.8 - 1.7     CARDIAC PANEL,(CK, CKMB & TROPONIN)    Collection Time: 08/31/19  1:40 PM   Result Value Ref Range    CK 47 26 - 192 U/L    CK - MB <1.0 <3.6 ng/ml    CK-MB Index  0.0 - 4.0 %     CALCULATION NOT PERFORMED WHEN RESULT IS BELOW LINEAR LIMIT    Troponin-I, QT <0.02 0.0 - 0.045 NG/ML   URINALYSIS W/ RFLX MICROSCOPIC    Collection Time: 08/31/19  4:11 PM   Result Value Ref Range    Color YELLOW      Appearance CLEAR      Specific gravity >1.030 (H) 1.005 - 1.030    pH (UA) 6.5 5.0 - 8.0      Protein NEGATIVE  NEG mg/dL    Glucose NEGATIVE  NEG mg/dL    Ketone TRACE (A) NEG mg/dL    Bilirubin NEGATIVE  NEG      Blood NEGATIVE  NEG      Urobilinogen 0.2 0.2 - 1.0 EU/dL    Nitrites NEGATIVE  NEG      Leukocyte Esterase MODERATE (A) NEG     URINE MICROSCOPIC ONLY    Collection Time: 08/31/19  4:11 PM   Result Value Ref Range    WBC TOO NUMEROUS TO COUNT 0 - 5 /hpf    RBC 0 to 1 0 - 5 /hpf    Epithelial cells 2+ 0 - 5 /lpf    Bacteria 3+ (A) NEG /hpf   POC LACTIC ACID    Collection Time: 08/31/19  5:40 PM   Result Value Ref Range    Lactic Acid (POC) 1.16 0.40 - 2.00 mmol/L       Radiologic Studies -   CTA CHEST W OR W WO CONT   Final Result   IMPRESSION:      1. No evidence of pulmonary embolism. 2.  Stable left lower lobe pleural-based mass. Recommend follow-up according to   the Fleischner's guidelines. 3. Emphysema.      ========      Fleischner Society Pulmonary Nodule Guidelines (revised 2017): Solid nodule >8 mm: Consider CT, PET-CT, or tissue sampling at 3 months.       XR CHEST PORT   Final Result   IMPRESSION:      No active cardiopulmonary disease. CT Results  (Last 48 hours)               08/31/19 1559  CTA CHEST W OR W WO CONT Final result    Impression:  IMPRESSION:       1. No evidence of pulmonary embolism. 2.  Stable left lower lobe pleural-based mass. Recommend follow-up according to   the Fleischner's guidelines. 3. Emphysema.       ========       Fleischner Society Pulmonary Nodule Guidelines (revised 2017): Solid nodule >8 mm: Consider CT, PET-CT, or tissue sampling at 3 months. Narrative:  EXAM: CTA chest       INDICATION: Pain. COMPARISON: May 11, 2019. TECHNIQUE: Axial CT imaging from the thoracic inlet through the diaphragm with   intravenous contrast. Coronal and sagittal MIP reformats were generated. One or more dose reduction techniques were used on this CT: automated exposure   control, adjustment of the mAs and/or kVp according to patient size, and   iterative reconstruction techniques. The specific techniques used on this CT   exam have been documented in the patient's electronic medical record. Digital   Imaging and Communications in Medicine (DICOM) format image data are available   to nonaffiliated external healthcare facilities or entities on a secure, media   free, reciprocally searchable basis with patient authorization for at least a   12-month period after this study. _______________       FINDINGS:       EXAM QUALITY: Adequate       PULMONARY ARTERIES: No evidence of pulmonary embolism. MEDIASTINUM: Normal heart size. No evidence of right heart strain. Aorta is   unremarkable. No pericardial effusion. Moderate atherosclerotic vascular   calcifications. LUNGS: Severe bilateral emphysematous changes. Stable left lower lobe spiculated   pleural-based 1.2 cm nodule. PLEURA: Normal.       AIRWAY: Normal.       LYMPH NODES: No enlarged nodes. UPPER ABDOMEN: Unremarkable.        OTHER: No acute or aggressive osseous abnormalities identified. _______________               CXR Results  (Last 48 hours)               08/31/19 1351  XR CHEST PORT Final result    Impression:  IMPRESSION:       No active cardiopulmonary disease. Narrative:  EXAM: CHEST RADIOGRAPH       CLINICAL INDICATION/HISTORY: SOB     > Additional: None       COMPARISON: 5/10/2019. TECHNIQUE: Portable frontal view of the chest       _______________       FINDINGS:       SUPPORT DEVICES: None. HEART AND MEDIASTINUM: No appreciable cardiomegaly. Remaining mediastinal   contours within normal limits. Aortic arch calcifications. LUNGS AND PLEURAL SPACES: Clear. No consolidation, mass or effusion. Hyperexpansion of the lungs. BONY THORAX AND SOFT TISSUES: Right shoulder anchor screw.       _______________                 Medications given in the ED-  Medications   lidocaine 4 % patch 1 Patch (1 Patch TransDERmal Apply Patch 8/31/19 1802)   methylPREDNISolone (PF) (Solu-MEDROL) injection 125 mg (125 mg IntraVENous Given 8/31/19 1340)   HYDROcodone-acetaminophen (NORCO) 5-325 mg per tablet 1 Tab (1 Tab Oral Given 8/31/19 1352)   iopamidol (ISOVUE-370) 76 % injection 100 mL (100 mL IntraVENous Given 8/31/19 1546)   cefTRIAXone (ROCEPHIN) 1 g in sterile water (preservative free) 10 mL IV syringe (1 g IntraVENous Given 8/31/19 1807)   ketorolac (TORADOL) injection 15 mg (15 mg IntraVENous Given 8/31/19 1759)   acetaminophen (TYLENOL) tablet 650 mg (650 mg Oral Given 8/31/19 1801)   sodium chloride 0.9 % bolus infusion 1,000 mL (1,000 mL IntraVENous New Bag 8/31/19 1758)         Medical Decision Making   I am the first provider for this patient. I reviewed the vital signs, available nursing notes, past medical history, past surgical history, family history and social history. Vital Signs-Reviewed the patient's vital signs.     Pulse Oximetry Analysis -100 % on room air    Cardiac Monitor:  Rate: 106 bpm  Rhythm: Sinus tachycardia    EKG interpretation: (Preliminary)  EKG read by Dr. Espinoza White at 1:22 PM  Sinus tachycardia at a rate of 105 bpm, IN interval 172 ms, QRS duration of 70 ms    Records Reviewed: Nursing Notes, Old Medical Records and Previous electrocardiograms    Provider Notes (Medical Decision Making): Jaquelin Arriola is a 71 y.o. female presenting with left-sided thoracic/CVA pain and worsening of her shortness of breath and cough. Suspect patient has mild COPD exacerbation but in addition found to have UTI and with the CVA tenderness concerning for pyelo. Patient has entero cutaneous fistula and has difficulty observing oral antibiotics. Discussed with patient. IV antibiotics initiated. Discussed with hospitalist for observation. Procedures:  Procedures    ED Course:   1:03 PM  Review of prior records and CTA from May showed left-sided pulmonary nodule that recommended short-term follow-up. Due to that being the location of patient's pain and symptoms today we will repeat CTA.    5:09 PM  Updated patient on all results. Patient states the pain is worse in her left side. She now has significant significant CVA tenderness on the left concerning for pyelonephritis. CONSULT NOTE:   6:16 PM  Dr. Espinoza White spoke with Dr. Edgar Ridley   Specialty: Hospitalist  Discussed pt's hx, disposition, and available diagnostic and imaging results over the telephone. Reviewed care plans. Will come see the patient. Diagnosis and Disposition     Critical Care Time: None    Core Measures:  For Hospitalized Patients:    1. Hospitalization Decision Time:  The decision to hospitalize the patient was made by Dr. Espinoza White at 6:05 PM on 8/31/2019    2. Aspirin: Aspirin was not given because the patient did not present with a stroke at the time of their Emergency Department evaluation    6:17 PM  Patient is being admitted to the hospital by Dr. Nalini Hicks.  The results of their tests and reasons for their admission have been discussed with them and/or available family. They convey agreement and understanding for the need to be admitted and for their admission diagnosis. CONDITIONS ON ADMISSION:  Sepsis is not present at the time of admission. Deep Vein Thrombosis is not present at the time of admission. Thrombosis is not present at the time of admission. Urinary Tract Infection is present at the time of admission. Pneumonia is not present at the time of admission. MRSA is not present at the time of admission. Wound infection is not present at the time of admission. Pressure Ulcer is not present at the time of admission. CLINICAL IMPRESSION:    1. Pyelonephritis    2. Lung nodule    3. COPD exacerbation ([facility]      _______________________________      Please note that this dictation was completed with [employer], the computer voice recognition software. Quite often unanticipated grammatical, syntax, homophones, and other interpretive errors are inadvertently transcribed by the computer software. Please disregard these errors. Please excuse any errors that have escaped final proofreading.

## 2019-08-31 NOTE — ED TRIAGE NOTES
Patient arrive via EMS for left mid back pain and cough x5 days, patient reports pain after coughing, a/ox4, ambulates independently

## 2019-08-31 NOTE — ED NOTES
Verbal shift change report given to Keli ALATORRE (oncoming nurse) by Cortney Branham (offgoing nurse). Report included the following information Kardex, OR Summary, Intake/Output, MAR, Recent Results and Med Rec Status.

## 2019-08-31 NOTE — H&P
History & Physical    Patient: Lars Cifuentes MRN: [id number]  CSN: [id number]    YOB: 1950  Age: 71 y.o. 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 Left lower spiculated 1.2 cm lung nodule on CT scan. Copd exacerbation. Left back pain. UTI r/o pyelonephritis. Pulmonary nodule 1.2 cm spiculated. Malnutrition protein severe chronic  Underweight. Hypothyroidism. Depression      IV antibiotics. IV steroids. Pain control. Will need outpatient w/u of nodule that will include PET +/- biopsy. DVT px.      CC: back pain     HPI:     Lars Cifuentes is a 71 y.o. female who per the ER,    \"Penelope Barker is a 71 y.o. female with PMHX of emphysema who presents to the emergency department C/O cough and shortness of breath for the past 5 days and now with left-sided thoracic back pain. She denies any fever, swelling of her legs, abdominal pain, other complaints. States this feels similar to prior episodes of emphysema. States she used her nebulizer just prior to arrival.\"    Seen in room 14. C/o pain in the back. Mild dyspnea as well. Found ot have ti. No uti symptoms. Concern for possible pyelonephritis. Due to her gi issues unable to absorb po abx well. Asked to bring in for iv abx and observation.       Past Medical History:   Diagnosis Date    Asthma     Depression     Emphysema of lung (HCC)     Gastroparesis     Hypothyroidism     Malabsorption     of fat    Pancreatic insufficiency     Restless leg     bilateral       Past Surgical History:   Procedure Laterality Date    ABDOMEN SURGERY PROC UNLISTED      HX HERNIA REPAIR      HX SMALL BOWEL RESECTION      intra aortic baloon pump    HX TUBAL LIGATION         History reviewed. No pertinent family history. Social History     Socioeconomic History    Marital status:      Spouse name: Not on file    Number of children: Not on file    Years of education: Not on file    Highest education level: Not on file   Tobacco Use    Smoking status: Former Smoker    Smokeless tobacco: Never Used   Substance and Sexual Activity    Alcohol use: Never     Frequency: Never    Drug use: Not Currently       Prior to Admission medications    Medication Sig Start Date End Date Taking? Authorizing Provider   methylPREDNISolone (MEDROL, WENDY,) 4 mg tablet Us as directed 6/13/19   Michael Hall MD   azithromycin (ZITHROMAX Z-WENYD) 250 mg tablet Take two tablets today then one tablet daily 5/11/19   Michael Hall MD   HYDROcodone-acetaminophen (NORCO) 5-325 mg per tablet Take 1 Tab by mouth every four (4) hours as needed for Pain. Aaron Ayala MD   TEMAZEPAM PO Take  by mouth as needed. Aaron Ayala MD   ropinirole HCl (REQUIP PO) Take  by mouth as needed. Aaron Ayala MD   levothyroxine (SYNTHROID) 112 mcg tablet Take 112 mcg by mouth Daily (before breakfast). Aaron Ayala MD   nystatin-triamcinolone (MYCOLOG II) topical cream Apply  to affected area three (3) times daily. 4/21/19   KATHLEEN Estes   lipase-protease-amylase (CREON) 12,000-38,000 -60,000 unit capsule Take  by mouth three (3) times daily (with meals). Aaron Ayala MD   loperamide (IMODIUM) 2 mg capsule Take  by mouth. Aaron Ayala MD   buPROPion XL (WELLBUTRIN XL) 150 mg tablet Take 150 mg by mouth every morning. Aaron Aayla MD   DULoxetine (CYMBALTA) 20 mg capsule Take 20 mg by mouth daily. Aaron Ayala MD   Cetirizine 10 mg cap Take  by mouth. Aaron Ayala MD   albuterol-ipratropium (DUO-NEB) 2.5 mg-0.5 mg/3 ml nebu 3 mL by Nebulization route.     Aaron Ayala MD   albuterol (PROVENTIL HFA, VENTOLIN HFA, PROAIR HFA) 90 mcg/actuation inhaler Take 1 Puff by inhalation every four (4) hours as needed for Wheezing. 19   Ghanshyam Olmedo MD       No Known Allergies    Review of Systems  Gen: No fever, chills, malaise, weight loss/gain. Heent: No headache, rhinorrhea, epistaxis, ear pain, hearing loss, sinus pain, neck pain/stiffness, sore throat. Heart: No chest pain, palpitations, KAUR, pnd, or orthopnea. Resp: see hpi  GI: No nausea, vomiting, diarrhea, constipation, melena or hematochezia. : No urinary obstruction, dysuria or hematuria. Derm: No rash, new skin lesion or pruritis. Musc/skeletal:see hpi  Vasc: No edema, cyanosis or claudication. Endo: No heat/cold intolerance, no polyuria,polydipsia or polyphagia. Neuro: No unilateral weakness, numbness, tingling. No seizures. Heme: No easy bruising or bleeding. Physical Exam:     Physical Exam:  Visit Vitals  /90 (BP 1 Location: Left arm, BP Patient Position: Supine)   Pulse 97   Temp 98.3 °F (36.8 °C)   Resp 20   Ht 5' (1.524 m)   Wt 42.6 kg (94 lb)   SpO2 97%   BMI 18.36 kg/m²      O2 Device: Room air    Temp (24hrs), Av °F (36.7 °C), Min:97.7 °F (36.5 °C), Max:98.3 °F (36.8 °C)    No intake/output data recorded. No intake/output data recorded. General:  Awake, cooperative, no distress. Head:  Normocephalic, without obvious abnormality, atraumatic. Eyes:  Conjunctivae/corneas clear, sclera anicteric, PERRL, EOMs intact. Nose: Nares normal. No drainage or sinus tenderness. Throat: Lips, mucosa, and tongue normal.    Neck: Supple, symmetrical, trachea midline, no adenopathy. Lungs: Moderate wheezing. No rales. Heart:  Regular rate and rhythm, S1, S2 normal, no murmur, click, rub or gallop. Abdomen: Soft, non-tender. Bowel sounds normal. No masses,  No organomegaly. Extremities: Extremities normal, atraumatic, no cyanosis or edema. Capillary refill normal.Left CVA tenderness. Pulses: 2+ and symmetric all extremities.    Skin: Skin color pink, turgor normal. No rashes or lesions   Neurologic: CNII-XII intact. No focal motor or sensory deficit. Labs Reviewed: All lab results for the last 24 hours reviewed. Recent Results (from the past 24 hour(s))   EKG, 12 LEAD, INITIAL    Collection Time: 08/31/19  1:14 PM   Result Value Ref Range    Ventricular Rate 105 BPM    Atrial Rate 105 BPM    P-R Interval 172 ms    QRS Duration 70 ms    Q-T Interval 308 ms    QTC Calculation (Bezet) 407 ms    Calculated P Axis 71 degrees    Calculated R Axis 57 degrees    Calculated T Axis 64 degrees    Diagnosis       Sinus tachycardia  Possible Left atrial enlargement  Borderline ECG  When compared with ECG of 10-MAY-2019 21:12,  No significant change was found     CBC WITH AUTOMATED DIFF    Collection Time: 08/31/19  1:40 PM   Result Value Ref Range    WBC 9.9 4.6 - 13.2 K/uL    RBC 4.40 4.20 - 5.30 M/uL    HGB 12.1 12.0 - 16.0 g/dL    HCT 37.8 35.0 - 45.0 %    MCV 85.9 74.0 - 97.0 FL    MCH 27.5 24.0 - 34.0 PG    MCHC 32.0 31.0 - 37.0 g/dL    RDW 16.2 (H) 11.6 - 14.5 %    PLATELET 684 130 - 692 K/uL    MPV 9.5 9.2 - 11.8 FL    NEUTROPHILS 55 40 - 73 %    LYMPHOCYTES 25 21 - 52 %    MONOCYTES 9 3 - 10 %    EOSINOPHILS 10 (H) 0 - 5 %    BASOPHILS 1 0 - 2 %    ABS. NEUTROPHILS 5.5 1.8 - 8.0 K/UL    ABS. LYMPHOCYTES 2.5 0.9 - 3.6 K/UL    ABS. MONOCYTES 0.9 0.05 - 1.2 K/UL    ABS. EOSINOPHILS 1.0 (H) 0.0 - 0.4 K/UL    ABS.  BASOPHILS 0.1 0.0 - 0.1 K/UL    DF AUTOMATED     METABOLIC PANEL, COMPREHENSIVE    Collection Time: 08/31/19  1:40 PM   Result Value Ref Range    Sodium 141 136 - 145 mmol/L    Potassium 4.3 3.5 - 5.5 mmol/L    Chloride 110 100 - 111 mmol/L    CO2 26 21 - 32 mmol/L    Anion gap 5 3.0 - 18 mmol/L    Glucose 79 74 - 99 mg/dL    BUN 16 7.0 - 18 MG/DL    Creatinine 0.56 (L) 0.6 - 1.3 MG/DL    BUN/Creatinine ratio 29 (H) 12 - 20      GFR est AA >60 >60 ml/min/1.73m2    GFR est non-AA >60 >60 ml/min/1.73m2    Calcium 8.7 8.5 - 10.1 MG/DL    Bilirubin, total 0.3 0.2 - 1.0 MG/DL    ALT (SGPT) 26 13 - 56 U/L    AST (SGOT) 28 10 - 38 U/L    Alk.  phosphatase 173 (H) 45 - 117 U/L    Protein, total 6.3 (L) 6.4 - 8.2 g/dL    Albumin 2.7 (L) 3.4 - 5.0 g/dL    Globulin 3.6 2.0 - 4.0 g/dL    A-G Ratio 0.8 0.8 - 1.7     CARDIAC PANEL,(CK, CKMB & TROPONIN)    Collection Time: 08/31/19  1:40 PM   Result Value Ref Range    CK 47 26 - 192 U/L    CK - MB <1.0 <3.6 ng/ml    CK-MB Index  0.0 - 4.0 %     CALCULATION NOT PERFORMED WHEN RESULT IS BELOW LINEAR LIMIT    Troponin-I, QT <0.02 0.0 - 0.045 NG/ML   URINALYSIS W/ RFLX MICROSCOPIC    Collection Time: 08/31/19  4:11 PM   Result Value Ref Range    Color YELLOW      Appearance CLEAR      Specific gravity >1.030 (H) 1.005 - 1.030    pH (UA) 6.5 5.0 - 8.0      Protein NEGATIVE  NEG mg/dL    Glucose NEGATIVE  NEG mg/dL    Ketone TRACE (A) NEG mg/dL    Bilirubin NEGATIVE  NEG      Blood NEGATIVE  NEG      Urobilinogen 0.2 0.2 - 1.0 EU/dL    Nitrites NEGATIVE  NEG      Leukocyte Esterase MODERATE (A) NEG     URINE MICROSCOPIC ONLY    Collection Time: 08/31/19  4:11 PM   Result Value Ref Range    WBC TOO NUMEROUS TO COUNT 0 - 5 /hpf    RBC 0 to 1 0 - 5 /hpf    Epithelial cells 2+ 0 - 5 /lpf    Bacteria 3+ (A) NEG /hpf   POC LACTIC ACID    Collection Time: 08/31/19  5:40 PM   Result Value Ref Range    Lactic Acid (POC) 1.16 0.40 - 2.00 mmol/L         CC: Tonia Huffman MD

## 2019-09-01 VITALS
HEIGHT: 60 IN | OXYGEN SATURATION: 98 % | RESPIRATION RATE: 16 BRPM | TEMPERATURE: 97.5 F | SYSTOLIC BLOOD PRESSURE: 134 MMHG | DIASTOLIC BLOOD PRESSURE: 62 MMHG | WEIGHT: 95.5 LBS | HEART RATE: 66 BPM | BODY MASS INDEX: 18.75 KG/M2

## 2019-09-01 LAB
ANION GAP SERPL CALC-SCNC: 7 MMOL/L (ref 3–18)
BUN SERPL-MCNC: 17 MG/DL (ref 7–18)
BUN/CREAT SERPL: 31 (ref 12–20)
CALCIUM SERPL-MCNC: 8.6 MG/DL (ref 8.5–10.1)
CHLORIDE SERPL-SCNC: 110 MMOL/L (ref 100–111)
CO2 SERPL-SCNC: 23 MMOL/L (ref 21–32)
CREAT SERPL-MCNC: 0.54 MG/DL (ref 0.6–1.3)
ERYTHROCYTE [DISTWIDTH] IN BLOOD BY AUTOMATED COUNT: 16.2 % (ref 11.6–14.5)
GLUCOSE SERPL-MCNC: 162 MG/DL (ref 74–99)
HCT VFR BLD AUTO: 33.7 % (ref 35–45)
HGB BLD-MCNC: 10.9 G/DL (ref 12–16)
MCH RBC QN AUTO: 27.4 PG (ref 24–34)
MCHC RBC AUTO-ENTMCNC: 32.3 G/DL (ref 31–37)
MCV RBC AUTO: 84.7 FL (ref 74–97)
PLATELET # BLD AUTO: 293 K/UL (ref 135–420)
PMV BLD AUTO: 9.8 FL (ref 9.2–11.8)
POTASSIUM SERPL-SCNC: 4.2 MMOL/L (ref 3.5–5.5)
RBC # BLD AUTO: 3.98 M/UL (ref 4.2–5.3)
SODIUM SERPL-SCNC: 140 MMOL/L (ref 136–145)
WBC # BLD AUTO: 6.6 K/UL (ref 4.6–13.2)

## 2019-09-01 PROCEDURE — 80048 BASIC METABOLIC PNL TOTAL CA: CPT

## 2019-09-01 PROCEDURE — 99218 HC RM OBSERVATION: CPT

## 2019-09-01 PROCEDURE — 96376 TX/PRO/DX INJ SAME DRUG ADON: CPT

## 2019-09-01 PROCEDURE — 74011250636 HC RX REV CODE- 250/636: Performed by: INTERNAL MEDICINE

## 2019-09-01 PROCEDURE — 74011250637 HC RX REV CODE- 250/637: Performed by: INTERNAL MEDICINE

## 2019-09-01 PROCEDURE — 96372 THER/PROPH/DIAG INJ SC/IM: CPT

## 2019-09-01 PROCEDURE — 36415 COLL VENOUS BLD VENIPUNCTURE: CPT

## 2019-09-01 PROCEDURE — 77030037875 HC DRSG MEPILEX <16IN BORD MOLN -A

## 2019-09-01 PROCEDURE — 77030011256 HC DRSG MEPILEX <16IN NO BORD MOLN -A

## 2019-09-01 PROCEDURE — 85027 COMPLETE CBC AUTOMATED: CPT

## 2019-09-01 RX ORDER — CEPHALEXIN 500 MG/1
500 CAPSULE ORAL 4 TIMES DAILY
Qty: 28 CAP | Refills: 0 | Status: SHIPPED | OUTPATIENT
Start: 2019-09-01 | End: 2019-09-29

## 2019-09-01 RX ORDER — CEPHALEXIN 500 MG/1
500 CAPSULE ORAL 4 TIMES DAILY
Qty: 28 CAP | Refills: 0 | Status: SHIPPED | OUTPATIENT
Start: 2019-09-01 | End: 2019-09-01 | Stop reason: SDUPTHER

## 2019-09-01 RX ORDER — METHYLPREDNISOLONE 4 MG/1
TABLET ORAL
Qty: 1 DOSE PACK | Refills: 0 | Status: SHIPPED | OUTPATIENT
Start: 2019-09-01 | End: 2019-09-01 | Stop reason: SDUPTHER

## 2019-09-01 RX ORDER — CETIRIZINE HCL 10 MG
10 TABLET ORAL DAILY
Status: DISCONTINUED | OUTPATIENT
Start: 2019-09-01 | End: 2019-09-01 | Stop reason: HOSPADM

## 2019-09-01 RX ORDER — LOPERAMIDE HYDROCHLORIDE 2 MG/1
4 CAPSULE ORAL 2 TIMES DAILY
Status: DISCONTINUED | OUTPATIENT
Start: 2019-09-01 | End: 2019-09-01 | Stop reason: HOSPADM

## 2019-09-01 RX ORDER — METHYLPREDNISOLONE 4 MG/1
TABLET ORAL
Qty: 1 DOSE PACK | Refills: 0 | Status: SHIPPED | OUTPATIENT
Start: 2019-09-01 | End: 2019-09-29

## 2019-09-01 RX ADMIN — BENZOCAINE AND MENTHOL 1 LOZENGE: 15; 3.6 LOZENGE ORAL at 13:20

## 2019-09-01 RX ADMIN — HEPARIN SODIUM 5000 UNITS: 5000 INJECTION INTRAVENOUS; SUBCUTANEOUS at 03:26

## 2019-09-01 RX ADMIN — FLUTICASONE FUROATE AND VILANTEROL TRIFENATATE 1 PUFF: 200; 25 POWDER RESPIRATORY (INHALATION) at 09:29

## 2019-09-01 RX ADMIN — OXYCODONE HYDROCHLORIDE AND ACETAMINOPHEN 1 TABLET: 7.5; 325 TABLET ORAL at 00:43

## 2019-09-01 RX ADMIN — LOPERAMIDE HYDROCHLORIDE 2 MG: 2 CAPSULE ORAL at 10:20

## 2019-09-01 RX ADMIN — METHYLPREDNISOLONE SODIUM SUCCINATE 40 MG: 40 INJECTION, POWDER, FOR SOLUTION INTRAMUSCULAR; INTRAVENOUS at 09:29

## 2019-09-01 RX ADMIN — Medication 10 MG: at 12:30

## 2019-09-01 RX ADMIN — LEVOTHYROXINE SODIUM 87.5 MCG: 25 TABLET ORAL at 06:56

## 2019-09-01 RX ADMIN — BUPROPION HYDROCHLORIDE 150 MG: 150 TABLET, EXTENDED RELEASE ORAL at 06:56

## 2019-09-01 RX ADMIN — HEPARIN SODIUM 5000 UNITS: 5000 INJECTION INTRAVENOUS; SUBCUTANEOUS at 10:20

## 2019-09-01 RX ADMIN — BENZOCAINE AND MENTHOL 1 LOZENGE: 15; 3.6 LOZENGE ORAL at 00:43

## 2019-09-01 RX ADMIN — LOPERAMIDE HYDROCHLORIDE 2 MG: 2 CAPSULE ORAL at 09:29

## 2019-09-01 RX ADMIN — TEMAZEPAM 30 MG: 15 CAPSULE ORAL at 03:31

## 2019-09-01 RX ADMIN — DULOXETINE HYDROCHLORIDE 60 MG: 60 CAPSULE, DELAYED RELEASE ORAL at 09:29

## 2019-09-01 NOTE — PROGRESS NOTES
1846 received patient  from ED via stretcher. 1942 assessment completed at this time. Lungs are clear. Mid abdomen wound with green watery drainage noted. Dressing changed now with 4x4 and mepilex, CDI. 20G LEFT AC and  22 G Left arm IV flushed,patent and capped. Lidocaine patch in patient left lower rib cage, still in place. 0522 Percocet PO given for back pain with relief.     0602 Lidocaine patch removed. Patient walks to the bathroom without any difficulty. Call bell within reach. Patients home meds kept at patients specific, patient notified.

## 2019-09-01 NOTE — ROUTINE PROCESS
Bedside and Verbal shift change report given to Oren Guaman RN (oncoming nurse) by Abhi Donnelly RN' (offgoing nurse). Report included the following information SBAR, ED Summary, Intake/Output, MAR and Recent Results.

## 2019-09-01 NOTE — PROGRESS NOTES
DC Plan: Discharge home today with MD follow up     [id number]  Chart reviewed as CM on call. Pt admitted in obs status. CM will follow for transition of care needs and will be available via hospital . 1200  Met with pt at bedside, no friend/friend present. Pt states she lives with a roommate and rents a room. States daughter, Cristiane Daniels lives nearby. States she takes medicaid taxi to appts. Will need Medicaid transport home. Pt states she is not currently using DME for ambulation. States she has wheelchair at [employer]. States she has bedside commode and shower chair in storage. Pt states she has a neb machine. States she sees PCP Patsy Crockett. States she sees MD Sienna Hill for GI. States she has appt with [facility] pulmonology in Oct, name unknown. States she has follow up appt with [facility], provider name unknown. No dc concerns identified. CM will cont to be available as needed via hospital . Oral and Written notification given to patient and/or caregiver informing them that they are currently an Outpatient receiving care in our facility. Outpatient services include Observation Services under [facility] requirements. Reason for Admission:   Per H&P, pt is \"is a 71 y.o. female with PMHX of emphysema who presents to the emergency department C/O cough and shortness of breath for the past 5 days and now with left-sided thoracic back pain.  She denies any fever, swelling of her legs, abdominal pain, other complaints.  States this feels similar to prior episodes of emphysema.  States she used her nebulizer just prior to arrival.     Seen in room 14. C/o pain in the back. Mild dyspnea as well. Found ot have ti. No uti symptoms. Concern for possible pyelonephritis. Due to her gi issues unable to absorb po abx well. Asked to bring in for iv abx and observation. \"                   RRAT Score:   12 low                  Plan for utilizing home health:      None pt independent Current Advanced Directive/Advance Care Plan: Not on file                         Transition of Care Plan:         MD follow up        Care Management Interventions  PCP Verified by CM: Yes  Mode of Transport at Discharge:  Other (see comment)(MEDICAID TAXI)  Transition of Care Consult (CM Consult): Discharge Planning  Current Support Network: Lives Alone, Other(RENTS ROOM)  Confirm Follow Up Transport: Other (see comment)(MEDICAID TAXI)  Plan discussed with Pt/Family/Caregiver: Yes  Freedom of Choice Offered: Yes  Discharge Location  Discharge Placement: Home

## 2019-09-01 NOTE — DISCHARGE SUMMARY
Discharge Summary    Patient: Zeynep Alonzo MRN: [id number]  CSN: [id number]    YOB: 1950  Age: 71 y.o. 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 methylPREDNISolone (MEDROLWENDY,) 4 mg tablet Follow insert directions  Qty: 1 Dose Pack, Refills: 0      cephALEXin (KEFLEX) 500 mg capsule Take 1 Cap by mouth four (4) times daily. Qty: 28 Cap, Refills: 0       !! - Potential duplicate medications found. Please discuss with provider. CONTINUE these medications which have NOT CHANGED    Details   !! lipase-protease-amylase (CREON) 12,000-38,000 -60,000 unit capsule Take 1 Cap by mouth four (4) times daily as needed. Indications: with snack      fluticasone propion-salmeterol (ADVAIR DISKUS) 500-50 mcg/dose diskus inhaler Take 1 Puff by inhalation every twelve (12) hours. acetaminophen (TYLENOL EXTRA STRENGTH) 500 mg tablet Take 500 mg by mouth every four (4) hours as needed for Pain.      guaiFENesin ER (MUCINEX) 600 mg ER tablet Take 600 mg by mouth as needed for Congestion. diphenhydrAMINE (BENADRYL ALLERGY) 25 mg tablet Take 25 mg by mouth as needed for Sleep. !! methylPREDNISolone (MEDROL, WENDY,) 4 mg tablet Us as directed  Qty: 1 Dose Pack, Refills: 0      HYDROcodone-acetaminophen (NORCO) 5-325 mg per tablet Take 1 Tab by mouth every four (4) hours as needed for Pain. TEMAZEPAM PO Take 30 mg by mouth as needed.       ropinirole HCl (REQUIP PO) Take  by mouth as needed. levothyroxine (SYNTHROID) 112 mcg tablet Take 88 mcg by mouth Daily (before breakfast). nystatin-triamcinolone (MYCOLOG II) topical cream Apply  to affected area three (3) times daily. Qty: 30 g, Refills: 2      !! lipase-protease-amylase (CREON) 12,000-38,000 -60,000 unit capsule Take 2 Caps by mouth three (3) times daily (with meals). loperamide (IMODIUM) 2 mg capsule Take 2 mg by mouth four (4) times daily. buPROPion XL (WELLBUTRIN XL) 150 mg tablet Take 150 mg by mouth every morning. DULoxetine (CYMBALTA) 20 mg capsule Take 60 mg by mouth daily. Cetirizine 10 mg cap Take 10 mg by mouth daily. albuterol-ipratropium (DUO-NEB) 2.5 mg-0.5 mg/3 ml nebu 3 mL by Nebulization route. albuterol (PROVENTIL HFA, VENTOLIN HFA, PROAIR HFA) 90 mcg/actuation inhaler Take 1 Puff by inhalation every four (4) hours as needed for Wheezing. Qty: 1 Inhaler, Refills: 1       !! - Potential duplicate medications found. Please discuss with provider. STOP taking these medications       azithromycin (ZITHROMAX Z-WENDY) 250 mg tablet Comments:   Reason for Stopping:               Discharge Condition: Good    Procedures : CT scan chest    Consults: None      PHYSICAL EXAM   Visit Vitals  /85   Pulse 64   Temp 97.6 °F (36.4 °C)   Resp 16   Ht 5' (1.524 m)   Wt 43.3 kg (95 lb 8 oz)   SpO2 94%   Breastfeeding? No   BMI 18.65 kg/m²     General: Awake, thin, underweight  HEENT: NC, Atraumatic. PERRLA, EOMI. Anicteric sclerae. Lungs:  CTA Bilaterally. No Wheezing/Rhonchi/Rales. Heart:  Regular  rhythm,  No murmur, No Rubs, No Gallops  Abdomen: Soft, Non distended, Non tender. +Bowel sounds,   Extremities: No c/c/e  Psych:   Not anxious or agitated. Neurologic:  No acute neurological deficits.                                      Admission HPI :     Jonas Barreto is a 71 y.o. female who per the ER,     \"Penelope Barker is a 71 y.o. female with PMHX of emphysema who presents to the emergency department C/O cough and shortness of breath for the past 5 days and now with left-sided thoracic back pain.  She denies any fever, swelling of her legs, abdominal pain, other complaints.  States this feels similar to prior episodes of emphysema.  States she used her nebulizer just prior to arrival.\"     Seen in room 14. C/o pain in the back. Mild dyspnea as well. Found ot have ti. No uti symptoms. Concern for possible pyelonephritis. Due to her gi issues unable to absorb po abx well. Asked to bring in for iv abx and observation. 70 y/o female with exacerbation in copd, uti and left sided pain in the back. Left lower spiculated 1.2 cm lung nodule on CT scan.       Copd exacerbation. Left back pain. UTI r/o pyelonephritis. Pulmonary nodule 1.2 cm spiculated. Malnutrition protein severe chronic  Underweight. Hypothyroidism. Depression        IV antibiotics. IV steroids. Pain control. Will need outpatient w/u of nodule that will include PET +/- biopsy. DVT px. Hospital Course :     Admitted. Started on solu medrol 40 mg iv q 12 hrs and bronchodilators. Given pain medication for back pain that was musculoskeletal vs pyelonephritis. Red iv rocephin for her uti. Feeling much improved. Ready to go home today. Will go home on po keflex to complete course of treatment for her uti/pyelonephritis. Will go home on medrol dose pack for her copd exacerbation. With regard to her spiculated pulmonary nodule, I suggest a PET as an outpatient. Her pcp can order this. She tells she has a pulmonology appt coming up at [address] [place] [address] in October. She might need a biopsy. Activity: Activity as tolerated    Diet: Regular Diet    Follow-up: F/u with pcp in one week.      Disposition: Home    Minutes spent on discharge: 45      Labs: Results:       Chemistry Recent Labs     09/01/19  0450 08/31/19  1340   * 79    141   K 4.2 4.3    110   CO2 23 26   BUN 17 16   CREA 0.54* 0.56*   CA 8.6 8.7   AGAP 7 5   BUCR 31* 29*   AP  --  173*   TP  --  6.3*   ALB  --  2.7*   GLOB  --  3.6   AGRAT  --  0.8      CBC w/Diff Recent Labs     09/01/19  0450 08/31/19  1340   WBC 6.6 9.9   RBC 3.98* 4.40   HGB 10.9* 12.1   HCT 33.7* 37.8    306   GRANS  --  55   LYMPH  --  25   EOS  --  10*      Cardiac Enzymes Recent Labs     08/31/19  1340   CPK 47   CKND1 CALCULATION NOT PERFORMED WHEN RESULT IS BELOW LINEAR LIMIT      Coagulation No results for input(s): PTP, INR, APTT in the last 72 hours. No lab exists for component: INREXT    Lipid Panel No results found for: CHOL, CHOLPOCT, CHOLX, CHLST, CHOLV, 994939, HDL, LDL, LDLC, DLDLP, 765159, VLDLC, VLDL, TGLX, TRIGL, TRIGP, TGLPOCT, CHHD, CHHDX   BNP No results for input(s): BNPP in the last 72 hours. Liver Enzymes Recent Labs     08/31/19  1340   TP 6.3*   ALB 2.7*   *   SGOT 28      Thyroid Studies No results found for: T4, T3U, TSH, TSHEXT         Significant Diagnostic Studies: Cta Chest W Or W Wo Cont    Result Date: 8/31/2019  EXAM: CTA chest INDICATION: Pain. COMPARISON: May 11, 2019. TECHNIQUE: Axial CT imaging from the thoracic inlet through the diaphragm with intravenous contrast. Coronal and sagittal MIP reformats were generated. One or more dose reduction techniques were used on this CT: automated exposure control, adjustment of the mAs and/or kVp according to patient size, and iterative reconstruction techniques. The specific techniques used on this CT exam have been documented in the patient's electronic medical record. Digital Imaging and Communications in Medicine (DICOM) format image data are available to nonaffiliated external healthcare facilities or entities on a secure, media free, reciprocally searchable basis with patient authorization for at least a 12-month period after this study. _______________ FINDINGS: EXAM QUALITY: Adequate PULMONARY ARTERIES: No evidence of pulmonary embolism.  MEDIASTINUM: Normal heart size. No evidence of right heart strain. Aorta is unremarkable. No pericardial effusion. Moderate atherosclerotic vascular calcifications. LUNGS: Severe bilateral emphysematous changes. Stable left lower lobe spiculated pleural-based 1.2 cm nodule. PLEURA: Normal. AIRWAY: Normal. LYMPH NODES: No enlarged nodes. UPPER ABDOMEN: Unremarkable. OTHER: No acute or aggressive osseous abnormalities identified. _______________     IMPRESSION: 1. No evidence of pulmonary embolism. 2.  Stable left lower lobe pleural-based mass. Recommend follow-up according to the Fleischner's guidelines. 3. Emphysema. ======== Fleischner Society Pulmonary Nodule Guidelines (revised 2017): Solid nodule >8 mm: Consider CT, PET-CT, or tissue sampling at 3 months. Xr Chest Port    Result Date: 8/31/2019  EXAM: CHEST RADIOGRAPH CLINICAL INDICATION/HISTORY: SOB   > Additional: None COMPARISON: 5/10/2019. TECHNIQUE: Portable frontal view of the chest _______________ FINDINGS: SUPPORT DEVICES: None. HEART AND MEDIASTINUM: No appreciable cardiomegaly. Remaining mediastinal contours within normal limits. Aortic arch calcifications. LUNGS AND PLEURAL SPACES: Clear. No consolidation, mass or effusion. Hyperexpansion of the lungs. BONY THORAX AND SOFT TISSUES: Right shoulder anchor screw. _______________     IMPRESSION: No active cardiopulmonary disease. No results found for this or any previous visit.         CC: Shyann Giordano MD

## 2019-09-01 NOTE — DISCHARGE INSTRUCTIONS
Patient Education        Learning About Emphysema  What is emphysema? Emphysema is damage to the air sacs in your lungs. In a healthy person, the tiny air sacs in the lungs are like balloons. As you breathe in and out, they get bigger and smaller to move air through your lungs. With emphysema, these air sacs lose their stretch. Less oxygen gets into your blood and you feel short of breath. Emphysema is a form of COPD (chronic obstructive pulmonary disease). Emphysema is usually caused by smoking. But chemical fumes, dust, or air pollution also can cause it over time. People who get it in their 35s or 45s may have a disorder that runs in families, called alpha-1 antitrypsin deficiency. But this is rare. What can you expect when you have emphysema? Emphysema gets worse over time. You cannot undo the damage to your lungs. Over time, you may find that:  · You get short of breath even when you do simple things like get dressed or fix a meal.  · It is hard to eat or exercise. · You lose weight and feel weaker. But there are things you can do to prevent more damage and feel better. What are the symptoms? The main symptoms of emphysema are:  · A cough that will not go away. · Mucus that comes up when you cough. · Shortness of breath that gets worse when you exercise. At times, your symptoms may suddenly flare up and get much worse. This is a called an exacerbation (say \"egg-CHERRIE--BAY-anat\"). When this happens, your usual symptoms quickly get worse and stay bad. This can be dangerous. You may have to go to the hospital.  How can you keep emphysema from getting worse? Don't smoke. That is the best way to keep emphysema from getting worse. If you already smoke, it is never too late to stop. If you need help quitting, talk to your doctor about stop-smoking programs and medicines. These can increase your chances of quitting for good.   You can do other things to keep emphysema from getting worse:  · Avoid bad air. Air pollution, chemical fumes, and dust also can make emphysema worse. · Get a flu shot every year. A shot may keep the flu from turning into something more serious, like pneumonia. A flu shot also may lower your chances of having a flare-up. · Get a pneumococcal shot. A shot can prevent some of the serious complications of pneumonia. Ask your doctor how often you should get this shot. How is emphysema treated? Emphysema is treated with medicines and oxygen. You also can take steps at home to stay healthy and keep your condition from getting worse. Medicines and oxygen therapy  · You may be taking medicines such as:  ? Bronchodilators. These help open your airways and make breathing easier. Bronchodilators are either short-acting (work for 6 to 9 hours) or long-acting (work for 24 hours). You inhale most bronchodilators, so they start to act quickly. Always carry your quick-relief inhaler with you in case you need it while you are away from home. ? Corticosteroids. These reduce airway inflammation. They come in pill or inhaled form. You must take these medicines every day for them to work well. ? Antibiotics. These medicines are used when you have a bacterial lung infection. · Take your medicines exactly as prescribed. Call your doctor if you think you are having a problem with your medicine. · Oxygen therapy boosts the amount of oxygen in your blood and helps you breathe easier. Use the flow rate your doctor has recommended, and do not change it without talking to your doctor first.  Other care at home  · If your doctor recommends it, get more exercise. Walking is a good choice. Bit by bit, increase the amount you walk every day. Try for at least 30 minutes on most days of the week. · Learn breathing methods--such as breathing through pursed lips--to help you become less short of breath.   · If your doctor has not set you up with a pulmonary rehabilitation program, talk to him or her about whether rehab is right for you. Rehab includes exercise programs, education about your disease and how to manage it, help with diet and other changes, and emotional support. · Eat regular, healthy meals. Use bronchodilators about 1 hour before you eat to make it easier to eat. Eat several small meals instead of three large ones. Drink beverages at the end of the meal. Avoid foods that are hard to chew. Follow-up care is a key part of your treatment and safety. Be sure to make and go to all appointments, and call your doctor if you are having problems. It's also a good idea to know your test results and keep a list of the medicines you take. Where can you learn more? Go to http://rockyEcovisionkenneth.info/. Enter C124 in the search box to learn more about \"Learning About Emphysema. \"  Current as of: September 5, 2018  Content Version: 12.1  © 6552-6596 [employer]. Care instructions adapted under license by [employer] (which disclaims liability or warranty for this information). If you have questions about a medical condition or this instruction, always ask your healthcare professional. [address] any warranty or liability for your use of this information. Patient Education     Advance Care Planning With COPD: After Your Visit  Your Care Instructions  Taking care of yourself when you have chronic obstructive pulmonary disease (COPD) will help you feel better and live longer. But the disease gets worse over time. If your health is getting worse, you may want to make decisions about end-of-life care. Planning for the end of your life does not mean that you are giving up. It is a way to make sure that your wishes are met. Also, being clear about your wishes will make it easier for your loved ones. Making plans while you still can may ease your mind. It may help make your final days less stressful and give them more meaning.   As part of your planning, it is a good idea to write advance directives. These are legal papers that tell doctors and family members your values and wishes for care at the end of your life. They may include a living will and a durable power of . You don't need a  to write these papers. Make sure that your doctor has a copy of these on file. And give a copy to a family member or close friend. You can have end-of-life care at home or in a nursing home. No matter where you are, hospice may be able to help with your care. Hospice provides pain relief. And it can give emotional and spiritual support to you and your family. You may work with a team of health professionals. The team may include doctors, nurses, social workers, and counselors. At all stages of your disease, your team can give treatment to manage your symptoms and keep you comfortable. Follow-up care is a key part of your treatment and safety. Be sure to make and go to all appointments, and call your doctor if you are having problems. It's also a good idea to know your test results and keep a list of the medicines you take. How can you care for yourself at home? · Talk openly and honestly with your doctor. This is the best way to understand the decisions you will need to make as your health changes. Know that you can always change your mind. · Ask your doctor about life-support measures that are commonly used. These include tube feedings, breathing machines, and fluids given through a vein (IV). Understanding these treatments will help you decide whether you want them. · You may decide that you would like to have life-supporting treatments for a limited time. This allows a trial period to see if they will help you. You may also decide that you want your doctor to take only certain measures to keep you alive. It is important to spell out these conditions so that your doctor and family understand them.   · Talk to your doctor about an estimate of how long you are likely to live. Your doctor likely will not be able to tell you exactly how long you have, but he or she may be able to give you a general time frame. He or she may also be able to point to things (such as oxygen needs and trouble caring for yourself) that michael the advance of the disease. And he or she can give you an idea about what usually happens with COPD. · Ask a friend or family member to make decisions for you when you no longer can. Talk to this person about the kinds of treatments you want and those that you do not want. Make sure that this person understands your wishes. If this person is not your durable power of  named in your advance directive, talk with your durable power of  too. · If you have not already done so, prepare a list of advance directives. These are instructions to your doctor and family members about what kind of care you want if you can't speak or express yourself. Ask your doctor or your [facility] department for information on how to write advance directives. They may have forms you can use. ¨ Think about a do-not-resuscitate order, or DNR. This order asks that no extra treatments be used if your heart stops. Extra treatments include electrical shock to restart your heart, a machine to breathe for you, and medicines that can stimulate the heart. If you decide to have a DNR order, ask your doctor to write it. Place the order in your home where everyone can easily see it. ¨ Think about a living will. A living will explains your wishes in case you are in a coma or can't communicate. Living rios tell doctors to use or not use treatments that would keep you alive. ¨ Think about naming a person to make decisions about your care if you are not able to. This is called a durable power of . Most people ask a close friend or family member. Talk to this person about the kinds of treatments you want and those that you do not want. ¨ All of these forms are simple to change. Tell your doctor what you want to change. Ask him or her to make a note in your medical file. Give your family updated copies of the papers. When should you call for help? Be sure to contact your doctor if you have any questions. Where can you learn more? Go to [employer].be  Enter G673 in the search box to learn more about \"Advance Care Planning With COPD: After Your Visit. \"   © 4883-0095 [employer]. Care instructions adapted under license by [facility] (which disclaims liability or warranty for this information). This care instruction is for use with your licensed healthcare professional. If you have questions about a medical condition or this instruction, always ask your healthcare professional. [place] [address] any warranty or liability for your use of this information.   Content Version: 45.3.445093; Current as of: September 30, 2013

## 2019-09-01 NOTE — PROGRESS NOTES
Physical Therapy Screening:  Services are not indicated at this time. [facility] based on results obtained during the nursing admission assessment. The patients chart was reviewed and the patient is not appropriate for a skilled therapy evaluation at this time. Please consult physical therapy if any therapy needs arise. Thank you.     Temo Esparza PTA

## 2019-09-01 NOTE — PROGRESS NOTES
1009 Pt states that she needs her cetirizine specifically this morning, as well as her loperamide 4mg twice a day; these medications are not ordered at this time. Informed Dr. Darylene Creek, and he gave orders to continue these meds. [facility] discharge order. Pt states that she will  meds at [facility]. Dressing on abdominal fistula was changed with gauze and mepilex per pt request. Medicaid transport arranged for discharge. Dual AVS reviewed with Katie Shea RN. All medications reviewed individually with patient. Opportunities for questions and concerns provided. Patient discharged via (mode of transport ie. Car, ambulance or air transport) transport  Patient's arm band appropriately discarded.

## 2019-09-03 LAB
ATRIAL RATE: 105 BPM
BACTERIA SPEC CULT: ABNORMAL
BACTERIA SPEC CULT: ABNORMAL
CALCULATED P AXIS, ECG09: 71 DEGREES
CALCULATED R AXIS, ECG10: 57 DEGREES
CALCULATED T AXIS, ECG11: 64 DEGREES
DIAGNOSIS, 93000: NORMAL
P-R INTERVAL, ECG05: 172 MS
Q-T INTERVAL, ECG07: 308 MS
QRS DURATION, ECG06: 70 MS
QTC CALCULATION (BEZET), ECG08: 407 MS
SERVICE CMNT-IMP: ABNORMAL
VENTRICULAR RATE, ECG03: 105 BPM

## 2019-09-06 LAB
BACTERIA SPEC CULT: NORMAL
BACTERIA SPEC CULT: NORMAL
SERVICE CMNT-IMP: NORMAL
SERVICE CMNT-IMP: NORMAL

## 2019-09-25 ENCOUNTER — APPOINTMENT (OUTPATIENT)
Dept: CT IMAGING | Age: 69
DRG: 659 | End: 2019-09-25
Attending: PHYSICIAN ASSISTANT
Payer: MEDICARE

## 2019-09-25 ENCOUNTER — HOSPITAL ENCOUNTER (INPATIENT)
Age: 69
LOS: 4 days | Discharge: HOME HEALTH | DRG: 659 | End: 2019-09-29
Attending: EMERGENCY MEDICINE | Admitting: FAMILY MEDICINE
Payer: MEDICARE

## 2019-09-25 ENCOUNTER — APPOINTMENT (OUTPATIENT)
Dept: GENERAL RADIOLOGY | Age: 69
DRG: 659 | End: 2019-09-25
Attending: PHYSICIAN ASSISTANT
Payer: MEDICARE

## 2019-09-25 DIAGNOSIS — E86.0 DEHYDRATION: ICD-10-CM

## 2019-09-25 DIAGNOSIS — N20.1 URETEROLITHIASIS: Primary | ICD-10-CM

## 2019-09-25 DIAGNOSIS — R79.89 ELEVATED LACTIC ACID LEVEL: ICD-10-CM

## 2019-09-25 DIAGNOSIS — N12 PYELITIS: ICD-10-CM

## 2019-09-25 LAB
ALBUMIN SERPL-MCNC: 2.9 G/DL (ref 3.4–5)
ALBUMIN/GLOB SERPL: 0.8 {RATIO} (ref 0.8–1.7)
ALP SERPL-CCNC: 183 U/L (ref 45–117)
ALT SERPL-CCNC: 27 U/L (ref 13–56)
ANION GAP SERPL CALC-SCNC: 6 MMOL/L (ref 3–18)
APPEARANCE UR: ABNORMAL
AST SERPL-CCNC: 41 U/L (ref 10–38)
BACTERIA URNS QL MICRO: ABNORMAL /HPF
BASOPHILS # BLD: 0 K/UL (ref 0–0.1)
BASOPHILS NFR BLD: 0 % (ref 0–2)
BILIRUB SERPL-MCNC: 0.7 MG/DL (ref 0.2–1)
BILIRUB UR QL: NEGATIVE
BUN SERPL-MCNC: 16 MG/DL (ref 7–18)
BUN/CREAT SERPL: 21 (ref 12–20)
CALCIUM SERPL-MCNC: 9.1 MG/DL (ref 8.5–10.1)
CHLORIDE SERPL-SCNC: 106 MMOL/L (ref 100–111)
CK MB CFR SERPL CALC: NORMAL % (ref 0–4)
CK MB SERPL-MCNC: <1 NG/ML (ref 5–25)
CK SERPL-CCNC: 30 U/L (ref 26–192)
CO2 SERPL-SCNC: 26 MMOL/L (ref 21–32)
COLOR UR: YELLOW
CREAT SERPL-MCNC: 0.78 MG/DL (ref 0.6–1.3)
DIFFERENTIAL METHOD BLD: ABNORMAL
EOSINOPHIL # BLD: 0 K/UL (ref 0–0.4)
EOSINOPHIL NFR BLD: 0 % (ref 0–5)
EPITH CASTS URNS QL MICRO: ABNORMAL /LPF (ref 0–5)
ERYTHROCYTE [DISTWIDTH] IN BLOOD BY AUTOMATED COUNT: 16.9 % (ref 11.6–14.5)
GLOBULIN SER CALC-MCNC: 3.8 G/DL (ref 2–4)
GLUCOSE SERPL-MCNC: 101 MG/DL (ref 74–99)
GLUCOSE UR STRIP.AUTO-MCNC: NEGATIVE MG/DL
HCT VFR BLD AUTO: 37.6 % (ref 35–45)
HGB BLD-MCNC: 11.9 G/DL (ref 12–16)
HGB UR QL STRIP: ABNORMAL
KETONES UR QL STRIP.AUTO: 15 MG/DL
LACTATE BLD-SCNC: 1.49 MMOL/L (ref 0.4–2)
LACTATE BLD-SCNC: 2.85 MMOL/L (ref 0.4–2)
LEUKOCYTE ESTERASE UR QL STRIP.AUTO: ABNORMAL
LYMPHOCYTES # BLD: 0.6 K/UL (ref 0.9–3.6)
LYMPHOCYTES NFR BLD: 5 % (ref 21–52)
MCH RBC QN AUTO: 27.7 PG (ref 24–34)
MCHC RBC AUTO-ENTMCNC: 31.6 G/DL (ref 31–37)
MCV RBC AUTO: 87.6 FL (ref 74–97)
MONOCYTES # BLD: 0.4 K/UL (ref 0.05–1.2)
MONOCYTES NFR BLD: 3 % (ref 3–10)
NEUTS SEG # BLD: 10.4 K/UL (ref 1.8–8)
NEUTS SEG NFR BLD: 92 % (ref 40–73)
NITRITE UR QL STRIP.AUTO: NEGATIVE
PH UR STRIP: 6 [PH] (ref 5–8)
PLATELET # BLD AUTO: 292 K/UL (ref 135–420)
PMV BLD AUTO: 9.7 FL (ref 9.2–11.8)
POTASSIUM SERPL-SCNC: 4.2 MMOL/L (ref 3.5–5.5)
PROT SERPL-MCNC: 6.7 G/DL (ref 6.4–8.2)
PROT UR STRIP-MCNC: ABNORMAL MG/DL
RBC # BLD AUTO: 4.29 M/UL (ref 4.2–5.3)
RBC #/AREA URNS HPF: ABNORMAL /HPF (ref 0–5)
SODIUM SERPL-SCNC: 138 MMOL/L (ref 136–145)
SP GR UR REFRACTOMETRY: 1.02 (ref 1–1.03)
TROPONIN I SERPL-MCNC: <0.02 NG/ML (ref 0–0.04)
UROBILINOGEN UR QL STRIP.AUTO: 1 EU/DL (ref 0.2–1)
WBC # BLD AUTO: 11.5 K/UL (ref 4.6–13.2)
WBC URNS QL MICRO: ABNORMAL /HPF (ref 0–5)

## 2019-09-25 PROCEDURE — 87040 BLOOD CULTURE FOR BACTERIA: CPT

## 2019-09-25 PROCEDURE — 96361 HYDRATE IV INFUSION ADD-ON: CPT

## 2019-09-25 PROCEDURE — 96365 THER/PROPH/DIAG IV INF INIT: CPT

## 2019-09-25 PROCEDURE — 74011000258 HC RX REV CODE- 258: Performed by: PHYSICIAN ASSISTANT

## 2019-09-25 PROCEDURE — 71045 X-RAY EXAM CHEST 1 VIEW: CPT

## 2019-09-25 PROCEDURE — 74011250636 HC RX REV CODE- 250/636: Performed by: FAMILY MEDICINE

## 2019-09-25 PROCEDURE — 87086 URINE CULTURE/COLONY COUNT: CPT

## 2019-09-25 PROCEDURE — 74011250637 HC RX REV CODE- 250/637: Performed by: FAMILY MEDICINE

## 2019-09-25 PROCEDURE — 81001 URINALYSIS AUTO W/SCOPE: CPT

## 2019-09-25 PROCEDURE — 83605 ASSAY OF LACTIC ACID: CPT

## 2019-09-25 PROCEDURE — 96375 TX/PRO/DX INJ NEW DRUG ADDON: CPT

## 2019-09-25 PROCEDURE — 65660000000 HC RM CCU STEPDOWN

## 2019-09-25 PROCEDURE — 74011636320 HC RX REV CODE- 636/320: Performed by: EMERGENCY MEDICINE

## 2019-09-25 PROCEDURE — 85025 COMPLETE CBC W/AUTO DIFF WBC: CPT

## 2019-09-25 PROCEDURE — 80053 COMPREHEN METABOLIC PANEL: CPT

## 2019-09-25 PROCEDURE — 82550 ASSAY OF CK (CPK): CPT

## 2019-09-25 PROCEDURE — 93005 ELECTROCARDIOGRAM TRACING: CPT

## 2019-09-25 PROCEDURE — 99285 EMERGENCY DEPT VISIT HI MDM: CPT

## 2019-09-25 PROCEDURE — 87077 CULTURE AEROBIC IDENTIFY: CPT

## 2019-09-25 PROCEDURE — 74177 CT ABD & PELVIS W/CONTRAST: CPT

## 2019-09-25 PROCEDURE — 87186 SC STD MICRODIL/AGAR DIL: CPT

## 2019-09-25 PROCEDURE — 74011250636 HC RX REV CODE- 250/636: Performed by: PHYSICIAN ASSISTANT

## 2019-09-25 RX ORDER — MONTELUKAST SODIUM 10 MG/1
10 TABLET ORAL
Status: DISCONTINUED | OUTPATIENT
Start: 2019-09-25 | End: 2019-09-29 | Stop reason: HOSPADM

## 2019-09-25 RX ORDER — MORPHINE SULFATE 4 MG/ML
4 INJECTION INTRAVENOUS
Status: COMPLETED | OUTPATIENT
Start: 2019-09-25 | End: 2019-09-25

## 2019-09-25 RX ORDER — HYDROCODONE BITARTRATE AND ACETAMINOPHEN 5; 325 MG/1; MG/1
1 TABLET ORAL
Status: DISCONTINUED | OUTPATIENT
Start: 2019-09-25 | End: 2019-09-29 | Stop reason: HOSPADM

## 2019-09-25 RX ORDER — MULTIVITAMIN WITH IRON
1 TABLET ORAL DAILY
COMMUNITY

## 2019-09-25 RX ORDER — LEVOFLOXACIN 5 MG/ML
750 INJECTION, SOLUTION INTRAVENOUS
Status: DISCONTINUED | OUTPATIENT
Start: 2019-09-25 | End: 2019-09-27

## 2019-09-25 RX ORDER — DIPHENHYDRAMINE HCL 25 MG
25 CAPSULE ORAL
Status: DISCONTINUED | OUTPATIENT
Start: 2019-09-25 | End: 2019-09-29 | Stop reason: HOSPADM

## 2019-09-25 RX ORDER — FLUTICASONE FUROATE AND VILANTEROL 200; 25 UG/1; UG/1
1 POWDER RESPIRATORY (INHALATION) DAILY
Status: DISCONTINUED | OUTPATIENT
Start: 2019-09-26 | End: 2019-09-29 | Stop reason: HOSPADM

## 2019-09-25 RX ORDER — HYDROXYZINE HYDROCHLORIDE 10 MG/1
TABLET, FILM COATED ORAL
Status: ON HOLD | COMMUNITY
End: 2019-09-25

## 2019-09-25 RX ORDER — LANOLIN ALCOHOL/MO/W.PET/CERES
250 CREAM (GRAM) TOPICAL DAILY
Status: DISCONTINUED | OUTPATIENT
Start: 2019-09-26 | End: 2019-09-29 | Stop reason: HOSPADM

## 2019-09-25 RX ORDER — MONTELUKAST SODIUM 10 MG/1
4 TABLET ORAL DAILY
COMMUNITY

## 2019-09-25 RX ORDER — TEMAZEPAM 15 MG/1
30 CAPSULE ORAL
Status: DISCONTINUED | OUTPATIENT
Start: 2019-09-25 | End: 2019-09-29 | Stop reason: HOSPADM

## 2019-09-25 RX ORDER — LEVOFLOXACIN 5 MG/ML
750 INJECTION, SOLUTION INTRAVENOUS EVERY 24 HOURS
Status: DISCONTINUED | OUTPATIENT
Start: 2019-09-25 | End: 2019-09-25 | Stop reason: DRUGHIGH

## 2019-09-25 RX ORDER — ROPINIROLE 1 MG/1
1 TABLET, FILM COATED ORAL
Status: DISCONTINUED | OUTPATIENT
Start: 2019-09-25 | End: 2019-09-29 | Stop reason: HOSPADM

## 2019-09-25 RX ORDER — LOPERAMIDE HYDROCHLORIDE 2 MG/1
4 CAPSULE ORAL 2 TIMES DAILY
Status: DISCONTINUED | OUTPATIENT
Start: 2019-09-25 | End: 2019-09-29 | Stop reason: HOSPADM

## 2019-09-25 RX ORDER — DULOXETIN HYDROCHLORIDE 60 MG/1
60 CAPSULE, DELAYED RELEASE ORAL DAILY
Status: DISCONTINUED | OUTPATIENT
Start: 2019-09-26 | End: 2019-09-29 | Stop reason: HOSPADM

## 2019-09-25 RX ORDER — ALBUTEROL SULFATE 90 UG/1
1 AEROSOL, METERED RESPIRATORY (INHALATION)
Status: DISCONTINUED | OUTPATIENT
Start: 2019-09-25 | End: 2019-09-29 | Stop reason: HOSPADM

## 2019-09-25 RX ORDER — SODIUM CHLORIDE 0.9 % (FLUSH) 0.9 %
5-10 SYRINGE (ML) INJECTION AS NEEDED
Status: DISCONTINUED | OUTPATIENT
Start: 2019-09-25 | End: 2019-09-29 | Stop reason: HOSPADM

## 2019-09-25 RX ORDER — HYDROMORPHONE HYDROCHLORIDE 1 MG/ML
0.5 INJECTION, SOLUTION INTRAMUSCULAR; INTRAVENOUS; SUBCUTANEOUS
Status: DISCONTINUED | OUTPATIENT
Start: 2019-09-25 | End: 2019-09-26

## 2019-09-25 RX ORDER — GUAIFENESIN 600 MG/1
600 TABLET, EXTENDED RELEASE ORAL AS NEEDED
Status: DISCONTINUED | OUTPATIENT
Start: 2019-09-25 | End: 2019-09-29 | Stop reason: HOSPADM

## 2019-09-25 RX ORDER — BUPROPION HYDROCHLORIDE 150 MG/1
300 TABLET ORAL
Status: DISCONTINUED | OUTPATIENT
Start: 2019-09-26 | End: 2019-09-29 | Stop reason: HOSPADM

## 2019-09-25 RX ORDER — ONDANSETRON 2 MG/ML
4 INJECTION INTRAMUSCULAR; INTRAVENOUS
Status: COMPLETED | OUTPATIENT
Start: 2019-09-25 | End: 2019-09-25

## 2019-09-25 RX ORDER — ONDANSETRON 2 MG/ML
4 INJECTION INTRAMUSCULAR; INTRAVENOUS
Status: DISCONTINUED | OUTPATIENT
Start: 2019-09-25 | End: 2019-09-29 | Stop reason: HOSPADM

## 2019-09-25 RX ORDER — SODIUM CHLORIDE 9 MG/ML
125 INJECTION, SOLUTION INTRAVENOUS CONTINUOUS
Status: DISCONTINUED | OUTPATIENT
Start: 2019-09-25 | End: 2019-09-29

## 2019-09-25 RX ADMIN — PIPERACILLIN SODIUM,TAZOBACTAM SODIUM 3.38 G: 3; .375 INJECTION, POWDER, FOR SOLUTION INTRAVENOUS at 18:26

## 2019-09-25 RX ADMIN — HYDROMORPHONE HYDROCHLORIDE 0.5 MG: 1 INJECTION, SOLUTION INTRAMUSCULAR; INTRAVENOUS; SUBCUTANEOUS at 23:22

## 2019-09-25 RX ADMIN — SODIUM CHLORIDE 293 ML: 900 INJECTION, SOLUTION INTRAVENOUS at 19:45

## 2019-09-25 RX ADMIN — MONTELUKAST 10 MG: 10 TABLET, FILM COATED ORAL at 23:16

## 2019-09-25 RX ADMIN — VANCOMYCIN HYDROCHLORIDE 750 MG: 750 INJECTION, POWDER, LYOPHILIZED, FOR SOLUTION INTRAVENOUS at 23:18

## 2019-09-25 RX ADMIN — ROPINIROLE HYDROCHLORIDE 1 MG: 1 TABLET, FILM COATED ORAL at 23:16

## 2019-09-25 RX ADMIN — LOPERAMIDE HYDROCHLORIDE 4 MG: 2 CAPSULE ORAL at 23:16

## 2019-09-25 RX ADMIN — SODIUM CHLORIDE 125 ML/HR: 900 INJECTION, SOLUTION INTRAVENOUS at 23:17

## 2019-09-25 RX ADMIN — MORPHINE SULFATE 4 MG: 4 INJECTION INTRAVENOUS at 19:17

## 2019-09-25 RX ADMIN — IOPAMIDOL 100 ML: 612 INJECTION, SOLUTION INTRAVENOUS at 18:10

## 2019-09-25 RX ADMIN — ONDANSETRON 4 MG: 2 INJECTION INTRAMUSCULAR; INTRAVENOUS at 19:16

## 2019-09-25 RX ADMIN — SODIUM CHLORIDE 1000 ML: 900 INJECTION, SOLUTION INTRAVENOUS at 18:01

## 2019-09-25 NOTE — ED PROVIDER NOTES
EMERGENCY DEPARTMENT HISTORY AND PHYSICAL EXAM    Date: 9/25/2019  Patient Name: Dony Cosme    History of Presenting Illness     Chief Complaint   Patient presents with    Back Pain         History Provided By: Patient    Dony Cosme is a 71 y.o. female with PMHX of emphysema, gastroparesis and malabsorption, multiple abdominal surgeries with an open healing wound to her abd wall waiting for wound care who presents to the emergency department C/O right-sided abdominal pain. Associated sxs include nausea vomiting and back pain. Patient reports acute onset this morning upon wakening of right-sided abdominal pain that seems to radiate up to her right flank associated with nausea and vomiting. Patient is attempted hydrocodone with no relief of symptoms which prompted ER visit via EMS. Patient endorses a history of multiple abdominal surgeries from \"a burst stomach\". Patient states that she is followed by her primary care doctor for a wound to the anterior abdomen she is being referred to wound care has upcoming appointment. Patient also reports having a CT scan at this facility that showed a \"spiculated lung nodule\". Patient states she has an appointment with a pulmonologist tomorrow for follow-up on this. He is unsure of the pulmonologist name. Pt denies chest pain, shortness of breath, cough, fever, diarrhea, and any other sxs or complaints.      PCP: Cali Padilla MD    Current Facility-Administered Medications   Medication Dose Route Frequency Provider Last Rate Last Dose    sodium chloride (NS) flush 5-10 mL  5-10 mL IntraVENous PRN Cesar Quick PA        piperacillin-tazobactam (ZOSYN) 3.375 g in 0.9% sodium chloride (MBP/ADV) 100 mL MBP  3.375 g IntraVENous Q6H Cesar Quick PA   Stopped at 09/25/19 1856    albuterol (PROVENTIL HFA, VENTOLIN HFA, PROAIR HFA) inhaler 1 Puff  1 Puff Inhalation Q4H PRN Priscila Mccoy MD         Current Outpatient Medications   Medication Sig Dispense Refill    methylPREDNISolone (MEDROL, WENDY,) 4 mg tablet Follow insert directions 1 Dose Pack 0    cephALEXin (KEFLEX) 500 mg capsule Take 1 Cap by mouth four (4) times daily. 28 Cap 0    lipase-protease-amylase (CREON) 12,000-38,000 -60,000 unit capsule Take 1 Cap by mouth four (4) times daily as needed. Indications: with snack      fluticasone propion-salmeterol (ADVAIR DISKUS) 500-50 mcg/dose diskus inhaler Take 1 Puff by inhalation every twelve (12) hours.  acetaminophen (TYLENOL EXTRA STRENGTH) 500 mg tablet Take 500 mg by mouth every four (4) hours as needed for Pain.  guaiFENesin ER (MUCINEX) 600 mg ER tablet Take 600 mg by mouth as needed for Congestion.  diphenhydrAMINE (BENADRYL ALLERGY) 25 mg tablet Take 25 mg by mouth as needed for Sleep.  methylPREDNISolone (MEDROL, WENDY,) 4 mg tablet Us as directed 1 Dose Pack 0    HYDROcodone-acetaminophen (NORCO) 5-325 mg per tablet Take 1 Tab by mouth every four (4) hours as needed for Pain.  TEMAZEPAM PO Take 30 mg by mouth as needed.  ropinirole HCl (REQUIP PO) Take  by mouth as needed.  levothyroxine (SYNTHROID) 112 mcg tablet Take 88 mcg by mouth Daily (before breakfast).  nystatin-triamcinolone (MYCOLOG II) topical cream Apply  to affected area three (3) times daily. 30 g 2    lipase-protease-amylase (CREON) 12,000-38,000 -60,000 unit capsule Take 2 Caps by mouth three (3) times daily (with meals).  loperamide (IMODIUM) 2 mg capsule Take 2 mg by mouth four (4) times daily.  buPROPion XL (WELLBUTRIN XL) 150 mg tablet Take 150 mg by mouth every morning.  DULoxetine (CYMBALTA) 20 mg capsule Take 60 mg by mouth daily.  Cetirizine 10 mg cap Take 10 mg by mouth daily.  albuterol-ipratropium (DUO-NEB) 2.5 mg-0.5 mg/3 ml nebu 3 mL by Nebulization route.       albuterol (PROVENTIL HFA, VENTOLIN HFA, PROAIR HFA) 90 mcg/actuation inhaler Take 1 Puff by inhalation every four (4) hours as needed for Wheezing. 1 Inhaler 1       Past History     Past Medical History:  Past Medical History:   Diagnosis Date    Asthma     Depression     Emphysema of lung (HCC)     Gastroparesis     Hypothyroidism     Malabsorption     of fat    Pancreatic insufficiency     Restless leg     bilateral       Past Surgical History:  Past Surgical History:   Procedure Laterality Date    ABDOMEN SURGERY PROC UNLISTED      HX HERNIA REPAIR      HX SMALL BOWEL RESECTION      intra aortic baloon pump    HX TUBAL LIGATION         Family History:  No family history on file. Social History:  Social History     Tobacco Use    Smoking status: Former Smoker    Smokeless tobacco: Never Used   Substance Use Topics    Alcohol use: Never     Frequency: Never    Drug use: Not Currently       Allergies:  No Known Allergies      Review of Systems   Review of Systems   Constitutional: Negative for fever. HENT: Negative for congestion. Respiratory: Negative for cough and shortness of breath. Cardiovascular: Negative for chest pain. Gastrointestinal: Positive for abdominal pain, nausea and vomiting. Negative for constipation and diarrhea. Genitourinary: Positive for pelvic pain. Negative for dysuria. Musculoskeletal: Positive for back pain. Skin: Positive for wound (Chronic abdomen). All other systems reviewed and are negative. Physical Exam     Vitals:    09/25/19 1628 09/25/19 1630 09/25/19 1939   BP: (!) 80/54 90/49    Pulse: 93  85   Resp: 16  18   Temp: 97.5 °F (36.4 °C)  98 °F (36.7 °C)   SpO2: 100% 100%    Weight: 43.1 kg (95 lb)       Physical Exam   Constitutional: She is oriented to person, place, and time. Thin appears older than stated age, but is in no acute distress   HENT:   Head: Normocephalic and atraumatic. Mouth/Throat: Mucous membranes are dry. Eyes: Pupils are equal, round, and reactive to light. Conjunctivae and EOM are normal.   Neck: Normal range of motion. Neck supple. Cardiovascular: Normal rate and regular rhythm. Pulmonary/Chest: Effort normal and breath sounds normal.   Abdominal: Soft. Bowel sounds are normal. She exhibits no distension. There is tenderness. There is CVA tenderness (right). There is no rebound and no guarding. Points to right side as site of pain; dressing anterior abdomen has some yellow fluid on a gauze under tape, wound appears to be healing stoma no expressible drainage, some slight erythema but does not appear to be acutely infected   Musculoskeletal: Normal range of motion. Neurological: She is alert and oriented to person, place, and time. Skin: Skin is warm and dry. Psychiatric: She has a normal mood and affect. Her behavior is normal.   Nursing note and vitals reviewed. Diagnostic Study Results     Labs -     Recent Results (from the past 12 hour(s))   METABOLIC PANEL, COMPREHENSIVE    Collection Time: 09/25/19  5:10 PM   Result Value Ref Range    Sodium 138 136 - 145 mmol/L    Potassium 4.2 3.5 - 5.5 mmol/L    Chloride 106 100 - 111 mmol/L    CO2 26 21 - 32 mmol/L    Anion gap 6 3.0 - 18 mmol/L    Glucose 101 (H) 74 - 99 mg/dL    BUN 16 7.0 - 18 MG/DL    Creatinine 0.78 0.6 - 1.3 MG/DL    BUN/Creatinine ratio 21 (H) 12 - 20      GFR est AA >60 >60 ml/min/1.73m2    GFR est non-AA >60 >60 ml/min/1.73m2    Calcium 9.1 8.5 - 10.1 MG/DL    Bilirubin, total 0.7 0.2 - 1.0 MG/DL    ALT (SGPT) 27 13 - 56 U/L    AST (SGOT) 41 (H) 10 - 38 U/L    Alk.  phosphatase 183 (H) 45 - 117 U/L    Protein, total 6.7 6.4 - 8.2 g/dL    Albumin 2.9 (L) 3.4 - 5.0 g/dL    Globulin 3.8 2.0 - 4.0 g/dL    A-G Ratio 0.8 0.8 - 1.7     CARDIAC PANEL,(CK, CKMB & TROPONIN)    Collection Time: 09/25/19  5:10 PM   Result Value Ref Range    CK 30 26 - 192 U/L    CK - MB <1.0 <3.6 ng/ml    CK-MB Index  0.0 - 4.0 %     CALCULATION NOT PERFORMED WHEN RESULT IS BELOW LINEAR LIMIT    Troponin-I, QT <0.02 0.0 - 0.045 NG/ML   POC LACTIC ACID    Collection Time: 09/25/19 5:18 PM   Result Value Ref Range    Lactic Acid (POC) 2.85 (HH) 0.40 - 2.00 mmol/L   EKG, 12 LEAD, INITIAL    Collection Time: 09/25/19  5:37 PM   Result Value Ref Range    Ventricular Rate 75 BPM    Atrial Rate 75 BPM    P-R Interval 156 ms    QRS Duration 74 ms    Q-T Interval 404 ms    QTC Calculation (Bezet) 451 ms    Calculated P Axis 53 degrees    Calculated R Axis 51 degrees    Calculated T Axis 62 degrees    Diagnosis       Normal sinus rhythm  Normal ECG  When compared with ECG of 31-AUG-2019 13:14,  Nonspecific T wave abnormality no longer evident in Anterior leads     CBC WITH AUTOMATED DIFF    Collection Time: 09/25/19  6:00 PM   Result Value Ref Range    WBC 11.5 4.6 - 13.2 K/uL    RBC 4.29 4. 20 - 5.30 M/uL    HGB 11.9 (L) 12.0 - 16.0 g/dL    HCT 37.6 35.0 - 45.0 %    MCV 87.6 74.0 - 97.0 FL    MCH 27.7 24.0 - 34.0 PG    MCHC 31.6 31.0 - 37.0 g/dL    RDW 16.9 (H) 11.6 - 14.5 %    PLATELET 818 901 - 723 K/uL    MPV 9.7 9.2 - 11.8 FL    NEUTROPHILS 92 (H) 40 - 73 %    LYMPHOCYTES 5 (L) 21 - 52 %    MONOCYTES 3 3 - 10 %    EOSINOPHILS 0 0 - 5 %    BASOPHILS 0 0 - 2 %    ABS. NEUTROPHILS 10.4 (H) 1.8 - 8.0 K/UL    ABS. LYMPHOCYTES 0.6 (L) 0.9 - 3.6 K/UL    ABS. MONOCYTES 0.4 0.05 - 1.2 K/UL    ABS. EOSINOPHILS 0.0 0.0 - 0.4 K/UL    ABS.  BASOPHILS 0.0 0.0 - 0.1 K/UL    DF AUTOMATED     URINALYSIS W/ RFLX MICROSCOPIC    Collection Time: 09/25/19  6:30 PM   Result Value Ref Range    Color YELLOW      Appearance TURBID      Specific gravity 1.016 1.005 - 1.030      pH (UA) 6.0 5.0 - 8.0      Protein TRACE (A) NEG mg/dL    Glucose NEGATIVE  NEG mg/dL    Ketone 15 (A) NEG mg/dL    Bilirubin NEGATIVE  NEG      Blood MODERATE (A) NEG      Urobilinogen 1.0 0.2 - 1.0 EU/dL    Nitrites NEGATIVE  NEG      Leukocyte Esterase LARGE (A) NEG     URINE MICROSCOPIC ONLY    Collection Time: 09/25/19  6:30 PM   Result Value Ref Range    WBC TOO NUMEROUS TO COUNT 0 - 5 /hpf    RBC 5 to 10 0 - 5 /hpf    Epithelial cells FEW 0 - 5 /lpf    Bacteria 4+ (A) NEG /hpf   POC LACTIC ACID    Collection Time: 09/25/19  7:31 PM   Result Value Ref Range    Lactic Acid (POC) 1.49 0.40 - 2.00 mmol/L       Radiologic Studies -   CT ABD PELV W CONT   Final Result   IMPRESSION:      1. There is severe right perirenal-retroperitoneal inflammatory stranding with   marked urothelial enhancement of the right renal pelvis and proximal ureter with   an associated nonobstructing calculus within the left renal pelvis which   measures approximately 8 mm in diameter. Findings suggest severe pyelitis. 2. Gallbladder is moderately distended but thin-walled. There is nonspecific   dilatation of the common bile duct, 9 mm in diameter. XR CHEST PORT    (Results Pending)     CT Results  (Last 48 hours)               09/25/19 1825  CT ABD PELV W CONT Final result    Impression:  IMPRESSION:       1. There is severe right perirenal-retroperitoneal inflammatory stranding with   marked urothelial enhancement of the right renal pelvis and proximal ureter with   an associated nonobstructing calculus within the left renal pelvis which   measures approximately 8 mm in diameter. Findings suggest severe pyelitis. 2. Gallbladder is moderately distended but thin-walled. There is nonspecific   dilatation of the common bile duct, 9 mm in diameter. Narrative:  EXAM: CT of the abdomen and pelvis       INDICATION: Abdominal pain, flank pain. Right-sided abdominal pain. COMPARISON: 8/31/2019, 4/28/2019       TECHNIQUE: Axial CT imaging of the abdomen and pelvis was performed with   intravenous contrast. Multiplanar reformats were generated. One or more dose   reduction techniques were used on this CT: automated exposure control,   adjustment of the mAs and/or kVp according to patient size, and iterative   reconstruction techniques. The specific techniques used on this CT exam have   been documented in the patient's electronic medical record. Digital Imaging and   Communications in the Medicine (DICOM) format image data are available to   nonaffiliated external healthcare facilities or entities on a secure, media   free, reciprocally searchable basis with patient authorization for at least a 12   month period after this study. _______________       FINDINGS:       LOWER CHEST: Emphysematous changes. Regions of basilar scarring-atelectasis. LIVER, BILIARY: Liver is normal. Gallbladder is moderately distended but   thin-walled. There is nonspecific dilatation of the common bile duct, 9 mm in   diameter. PANCREAS: Normal.       SPLEEN: Normal.       ADRENALS: Normal.       KIDNEYS: There is severe right perirenal-retroperitoneal inflammatory stranding   with marked urothelial enhancement of the right renal pelvis and proximal ureter   with an associated nonobstructing calculus within the left renal pelvis which   measures approximately 8 mm in diameter. Kidneys enhance   symmetrically-homogenously. LYMPH NODES: No enlarged lymph nodes. GASTROINTESTINAL TRACT: No bowel obstruction or acute appearing wall thickening. Previous surgical anastomosis involving the stomach and bowel noted. PELVIC ORGANS: Stable right pelvic ovoid cyst, 42 x 20 mm. Clovia Carls VASCULATURE: Unremarkable. BONES: Interval but remote appearing superior endplate compression deformity at   L4. No acute or aggressive osseous abnormalities identified.        OTHER: None.       _______________               CXR Results  (Last 48 hours)    None          Medications given in the ED-  Medications   sodium chloride (NS) flush 5-10 mL (has no administration in time range)   piperacillin-tazobactam (ZOSYN) 3.375 g in 0.9% sodium chloride (MBP/ADV) 100 mL MBP (0 g IntraVENous IV Completed 9/25/19 5076)   albuterol (PROVENTIL HFA, VENTOLIN HFA, PROAIR HFA) inhaler 1 Puff (has no administration in time range)   sodium chloride 0.9 % bolus infusion 1,000 mL (1,000 mL IntraVENous New Bag 9/25/19 1801)     Followed by   sodium chloride 0.9 % bolus infusion 293 mL (293 mL IntraVENous New Bag 9/25/19 1945)   iopamidol (ISOVUE 300) 61 % contrast injection  mL (100 mL IntraVENous Given 9/25/19 1810)   morphine injection 4 mg (4 mg IntraVENous Given 9/25/19 1917)   ondansetron (ZOFRAN) injection 4 mg (4 mg IntraVENous Given 9/25/19 1916)         Medical Decision Making   I am the first provider for this patient. I reviewed the vital signs, available nursing notes, past medical history, past surgical history, family history and social history. Vital Signs-Reviewed the patient's vital signs. Records Reviewed: Nursing Notes    Procedures:  Procedures    ED Course:   4:44 PM   Initial assessment performed. The patients presenting problems have been discussed, and they are in agreement with the care plan formulated and outlined with them. I have encouraged them to ask questions as they arise throughout their visit. 6:64 PM   Has systolic blood pressure of 90 with a pulse of 100. Physical assessment appears dry with a nontender abdomen. Due to vital signs will initiate sepsis bundle. 7:11 PM   Consult with Dr. Meme Cortez hospitalist who agrees to admit patient asked that urology be consulted    7:40 PM  Spoke with Dr. Lela Salcedo urology agrees with need for admission and IV fluids. NPO at midnight. Likely patient will get a stent in the morning. 8:00 PM  Consult with Dr. Meme Cortez who is in department to see patient, suggest telemetry    8:28 PM  Blood pressure is improved after IV fluids systolic pressure 157. Antibiotics have infused      Discussion: 71 y.o. female being admitted to hospitalist service with urology consult for right-sided kidney stone and pyelitis. Critical Care Time: 0    Core Measures:  For Hospitalized Patients:    1.  Hospitalization Decision Time:  The decision to hospitalize the patient was made by KATHLEEN Ramos  at 7:42 PM on 9/25/2019    2. Aspirin: Aspirin was not given because the patient did not present with a stroke at the time of their Emergency Department evaluation    7:41 PM  Patient is being admitted to the hospital by Dr Meme Cortez. The results of their tests and reasons for their admission have been discussed with them and/or available family. They convey agreement and understanding for the need to be admitted and for their admission diagnosis. CONDITIONS ON ADMISSION:  Sepsis is present at the time of admission. Deep Vein Thrombosis is not present at the time of admission. Thrombosis is not present at the time of admission. Urinary Tract Infection is present at the time of admission. Pneumonia is not present at the time of admission. MRSA may be present at the time of admission. Wound infection is present at the time of admission. Pressure Ulcer is not present at the time of admission. CLINICAL IMPRESSION:    1. Ureterolithiasis    2. Pyelitis    3. Elevated lactic acid level    4. Dehydration          Please note that this dictation was completed with Shaker, the computer voice recognition software. Quite often unanticipated grammatical, syntax, homophones, and other interpretive errors are inadvertently transcribed by the computer software. Please disregard these errors. Please excuse any errors that have escaped final proofreading.

## 2019-09-25 NOTE — ED TRIAGE NOTES
Patient arrives via EMS from home d/t acute onset pelvic pain that started today that radiated to lower back pain. Patient denies heavy lifting/pulling. Patient reports possible cyst in ovary. Patient admits to admin rx Hydrocodone 1100 today.

## 2019-09-26 ENCOUNTER — ANESTHESIA (OUTPATIENT)
Dept: SURGERY | Age: 69
DRG: 659 | End: 2019-09-26
Payer: MEDICARE

## 2019-09-26 ENCOUNTER — APPOINTMENT (OUTPATIENT)
Dept: ULTRASOUND IMAGING | Age: 69
DRG: 659 | End: 2019-09-26
Attending: FAMILY MEDICINE
Payer: MEDICARE

## 2019-09-26 ENCOUNTER — ANESTHESIA EVENT (OUTPATIENT)
Dept: SURGERY | Age: 69
DRG: 659 | End: 2019-09-26
Payer: MEDICARE

## 2019-09-26 ENCOUNTER — APPOINTMENT (OUTPATIENT)
Dept: GENERAL RADIOLOGY | Age: 69
DRG: 659 | End: 2019-09-26
Attending: UROLOGY
Payer: MEDICARE

## 2019-09-26 LAB
ANION GAP SERPL CALC-SCNC: 9 MMOL/L (ref 3–18)
BUN SERPL-MCNC: 13 MG/DL (ref 7–18)
BUN/CREAT SERPL: 21 (ref 12–20)
CALCIUM SERPL-MCNC: 8.7 MG/DL (ref 8.5–10.1)
CHLORIDE SERPL-SCNC: 107 MMOL/L (ref 100–111)
CO2 SERPL-SCNC: 22 MMOL/L (ref 21–32)
CREAT SERPL-MCNC: 0.61 MG/DL (ref 0.6–1.3)
GLUCOSE SERPL-MCNC: 98 MG/DL (ref 74–99)
POTASSIUM SERPL-SCNC: 4.4 MMOL/L (ref 3.5–5.5)
SODIUM SERPL-SCNC: 138 MMOL/L (ref 136–145)

## 2019-09-26 PROCEDURE — 74011000258 HC RX REV CODE- 258: Performed by: PHYSICIAN ASSISTANT

## 2019-09-26 PROCEDURE — 36415 COLL VENOUS BLD VENIPUNCTURE: CPT

## 2019-09-26 PROCEDURE — 74011250636 HC RX REV CODE- 250/636

## 2019-09-26 PROCEDURE — 77010033678 HC OXYGEN DAILY

## 2019-09-26 PROCEDURE — 77030013079 HC BLNKT BAIR HGGR 3M -A: Performed by: ANESTHESIOLOGY

## 2019-09-26 PROCEDURE — 74011000250 HC RX REV CODE- 250

## 2019-09-26 PROCEDURE — 74011250637 HC RX REV CODE- 250/637: Performed by: UROLOGY

## 2019-09-26 PROCEDURE — 77030008683 HC TU ET CUF COVD -A: Performed by: ANESTHESIOLOGY

## 2019-09-26 PROCEDURE — 74011000258 HC RX REV CODE- 258: Performed by: UROLOGY

## 2019-09-26 PROCEDURE — 74011000250 HC RX REV CODE- 250: Performed by: UROLOGY

## 2019-09-26 PROCEDURE — 65660000000 HC RM CCU STEPDOWN

## 2019-09-26 PROCEDURE — 77030008477 HC STYL SATN SLP COVD -A: Performed by: ANESTHESIOLOGY

## 2019-09-26 PROCEDURE — C1769 GUIDE WIRE: HCPCS | Performed by: UROLOGY

## 2019-09-26 PROCEDURE — 74011250636 HC RX REV CODE- 250/636: Performed by: UROLOGY

## 2019-09-26 PROCEDURE — 80048 BASIC METABOLIC PNL TOTAL CA: CPT

## 2019-09-26 PROCEDURE — BT1D1ZZ FLUOROSCOPY OF RIGHT KIDNEY, URETER AND BLADDER USING LOW OSMOLAR CONTRAST: ICD-10-PCS | Performed by: UROLOGY

## 2019-09-26 PROCEDURE — 76060000032 HC ANESTHESIA 0.5 TO 1 HR: Performed by: UROLOGY

## 2019-09-26 PROCEDURE — 74011636320 HC RX REV CODE- 636/320: Performed by: UROLOGY

## 2019-09-26 PROCEDURE — 77030018836 HC SOL IRR NACL ICUM -A: Performed by: UROLOGY

## 2019-09-26 PROCEDURE — 74011250636 HC RX REV CODE- 250/636: Performed by: PHYSICIAN ASSISTANT

## 2019-09-26 PROCEDURE — C1758 CATHETER, URETERAL: HCPCS | Performed by: UROLOGY

## 2019-09-26 PROCEDURE — 77030040361 HC SLV COMPR DVT MDII -B: Performed by: UROLOGY

## 2019-09-26 PROCEDURE — 76010000138 HC OR TIME 0.5 TO 1 HR: Performed by: UROLOGY

## 2019-09-26 PROCEDURE — 0T768DZ DILATION OF RIGHT URETER WITH INTRALUMINAL DEVICE, VIA NATURAL OR ARTIFICIAL OPENING ENDOSCOPIC: ICD-10-PCS | Performed by: UROLOGY

## 2019-09-26 PROCEDURE — 93005 ELECTROCARDIOGRAM TRACING: CPT

## 2019-09-26 PROCEDURE — C2617 STENT, NON-COR, TEM W/O DEL: HCPCS | Performed by: UROLOGY

## 2019-09-26 PROCEDURE — 76210000006 HC OR PH I REC 0.5 TO 1 HR: Performed by: UROLOGY

## 2019-09-26 PROCEDURE — 74011250636 HC RX REV CODE- 250/636: Performed by: FAMILY MEDICINE

## 2019-09-26 DEVICE — URETERAL STENT
Type: IMPLANTABLE DEVICE | Site: URETER | Status: FUNCTIONAL
Brand: POLARIS™ ULTRA

## 2019-09-26 RX ORDER — PHENAZOPYRIDINE HYDROCHLORIDE 100 MG/1
200 TABLET, FILM COATED ORAL
Status: DISCONTINUED | OUTPATIENT
Start: 2019-09-26 | End: 2019-09-29 | Stop reason: HOSPADM

## 2019-09-26 RX ORDER — HYDROMORPHONE HYDROCHLORIDE 1 MG/ML
0.5 INJECTION, SOLUTION INTRAMUSCULAR; INTRAVENOUS; SUBCUTANEOUS
Status: DISCONTINUED | OUTPATIENT
Start: 2019-09-26 | End: 2019-09-26 | Stop reason: HOSPADM

## 2019-09-26 RX ORDER — SODIUM CHLORIDE 0.9 % (FLUSH) 0.9 %
5-40 SYRINGE (ML) INJECTION AS NEEDED
Status: DISCONTINUED | OUTPATIENT
Start: 2019-09-26 | End: 2019-09-26 | Stop reason: HOSPADM

## 2019-09-26 RX ORDER — SODIUM CHLORIDE 0.9 % (FLUSH) 0.9 %
5-40 SYRINGE (ML) INJECTION EVERY 8 HOURS
Status: DISCONTINUED | OUTPATIENT
Start: 2019-09-26 | End: 2019-09-26 | Stop reason: HOSPADM

## 2019-09-26 RX ORDER — ONDANSETRON 2 MG/ML
INJECTION INTRAMUSCULAR; INTRAVENOUS AS NEEDED
Status: DISCONTINUED | OUTPATIENT
Start: 2019-09-26 | End: 2019-09-26 | Stop reason: HOSPADM

## 2019-09-26 RX ORDER — FENTANYL CITRATE 50 UG/ML
25 INJECTION, SOLUTION INTRAMUSCULAR; INTRAVENOUS AS NEEDED
Status: DISCONTINUED | OUTPATIENT
Start: 2019-09-26 | End: 2019-09-26 | Stop reason: HOSPADM

## 2019-09-26 RX ORDER — MIDAZOLAM HYDROCHLORIDE 1 MG/ML
INJECTION, SOLUTION INTRAMUSCULAR; INTRAVENOUS AS NEEDED
Status: DISCONTINUED | OUTPATIENT
Start: 2019-09-26 | End: 2019-09-26 | Stop reason: HOSPADM

## 2019-09-26 RX ORDER — PROPOFOL 10 MG/ML
INJECTION, EMULSION INTRAVENOUS AS NEEDED
Status: DISCONTINUED | OUTPATIENT
Start: 2019-09-26 | End: 2019-09-26 | Stop reason: HOSPADM

## 2019-09-26 RX ORDER — ROCURONIUM BROMIDE 10 MG/ML
INJECTION, SOLUTION INTRAVENOUS AS NEEDED
Status: DISCONTINUED | OUTPATIENT
Start: 2019-09-26 | End: 2019-09-26 | Stop reason: HOSPADM

## 2019-09-26 RX ORDER — SUCCINYLCHOLINE CHLORIDE 20 MG/ML
INJECTION INTRAMUSCULAR; INTRAVENOUS AS NEEDED
Status: DISCONTINUED | OUTPATIENT
Start: 2019-09-26 | End: 2019-09-26 | Stop reason: HOSPADM

## 2019-09-26 RX ORDER — SODIUM CHLORIDE, SODIUM LACTATE, POTASSIUM CHLORIDE, CALCIUM CHLORIDE 600; 310; 30; 20 MG/100ML; MG/100ML; MG/100ML; MG/100ML
INJECTION, SOLUTION INTRAVENOUS
Status: DISCONTINUED | OUTPATIENT
Start: 2019-09-26 | End: 2019-09-26 | Stop reason: HOSPADM

## 2019-09-26 RX ORDER — FENTANYL CITRATE 50 UG/ML
INJECTION, SOLUTION INTRAMUSCULAR; INTRAVENOUS AS NEEDED
Status: DISCONTINUED | OUTPATIENT
Start: 2019-09-26 | End: 2019-09-26 | Stop reason: HOSPADM

## 2019-09-26 RX ORDER — SODIUM CHLORIDE, SODIUM LACTATE, POTASSIUM CHLORIDE, CALCIUM CHLORIDE 600; 310; 30; 20 MG/100ML; MG/100ML; MG/100ML; MG/100ML
125 INJECTION, SOLUTION INTRAVENOUS CONTINUOUS
Status: DISCONTINUED | OUTPATIENT
Start: 2019-09-26 | End: 2019-09-26 | Stop reason: HOSPADM

## 2019-09-26 RX ORDER — DEXAMETHASONE SODIUM PHOSPHATE 4 MG/ML
INJECTION, SOLUTION INTRA-ARTICULAR; INTRALESIONAL; INTRAMUSCULAR; INTRAVENOUS; SOFT TISSUE AS NEEDED
Status: DISCONTINUED | OUTPATIENT
Start: 2019-09-26 | End: 2019-09-26 | Stop reason: HOSPADM

## 2019-09-26 RX ORDER — LIDOCAINE HYDROCHLORIDE 20 MG/ML
INJECTION, SOLUTION EPIDURAL; INFILTRATION; INTRACAUDAL; PERINEURAL AS NEEDED
Status: DISCONTINUED | OUTPATIENT
Start: 2019-09-26 | End: 2019-09-26 | Stop reason: HOSPADM

## 2019-09-26 RX ADMIN — PROPOFOL 90 MG: 10 INJECTION, EMULSION INTRAVENOUS at 06:24

## 2019-09-26 RX ADMIN — SUCCINYLCHOLINE CHLORIDE 80 MG: 20 INJECTION INTRAMUSCULAR; INTRAVENOUS at 06:24

## 2019-09-26 RX ADMIN — BUPROPION HYDROCHLORIDE 300 MG: 150 TABLET, EXTENDED RELEASE ORAL at 10:39

## 2019-09-26 RX ADMIN — PIPERACILLIN SODIUM,TAZOBACTAM SODIUM 3.38 G: 3; .375 INJECTION, POWDER, FOR SOLUTION INTRAVENOUS at 10:39

## 2019-09-26 RX ADMIN — HYDROCODONE BITARTRATE AND ACETAMINOPHEN 1 TABLET: 5; 325 TABLET ORAL at 14:28

## 2019-09-26 RX ADMIN — HYDROMORPHONE HYDROCHLORIDE 0.5 MG: 1 INJECTION, SOLUTION INTRAMUSCULAR; INTRAVENOUS; SUBCUTANEOUS at 03:34

## 2019-09-26 RX ADMIN — MONTELUKAST 10 MG: 10 TABLET, FILM COATED ORAL at 22:45

## 2019-09-26 RX ADMIN — PIPERACILLIN SODIUM,TAZOBACTAM SODIUM 3.38 G: 3; .375 INJECTION, POWDER, FOR SOLUTION INTRAVENOUS at 02:07

## 2019-09-26 RX ADMIN — UMECLIDINIUM 1 PUFF: 62.5 AEROSOL, POWDER ORAL at 10:42

## 2019-09-26 RX ADMIN — SODIUM CHLORIDE, SODIUM LACTATE, POTASSIUM CHLORIDE, CALCIUM CHLORIDE: 600; 310; 30; 20 INJECTION, SOLUTION INTRAVENOUS at 06:17

## 2019-09-26 RX ADMIN — LEVOTHYROXINE SODIUM 87.5 MCG: 25 TABLET ORAL at 10:39

## 2019-09-26 RX ADMIN — PIPERACILLIN SODIUM,TAZOBACTAM SODIUM 3.38 G: 3; .375 INJECTION, POWDER, FOR SOLUTION INTRAVENOUS at 16:12

## 2019-09-26 RX ADMIN — ONDANSETRON 4 MG: 2 INJECTION INTRAMUSCULAR; INTRAVENOUS at 06:37

## 2019-09-26 RX ADMIN — DULOXETINE HYDROCHLORIDE 60 MG: 60 CAPSULE, DELAYED RELEASE ORAL at 10:41

## 2019-09-26 RX ADMIN — ONDANSETRON 4 MG: 2 INJECTION INTRAMUSCULAR; INTRAVENOUS at 03:33

## 2019-09-26 RX ADMIN — LOPERAMIDE HYDROCHLORIDE 4 MG: 2 CAPSULE ORAL at 20:54

## 2019-09-26 RX ADMIN — CYANOCOBALAMIN TAB 500 MCG 250 MCG: 500 TAB at 10:41

## 2019-09-26 RX ADMIN — DEXAMETHASONE SODIUM PHOSPHATE 4 MG: 4 INJECTION, SOLUTION INTRA-ARTICULAR; INTRALESIONAL; INTRAMUSCULAR; INTRAVENOUS; SOFT TISSUE at 06:37

## 2019-09-26 RX ADMIN — FLUTICASONE FUROATE AND VILANTEROL TRIFENATATE 1 PUFF: 200; 25 POWDER RESPIRATORY (INHALATION) at 10:43

## 2019-09-26 RX ADMIN — SODIUM CHLORIDE 125 ML/HR: 900 INJECTION, SOLUTION INTRAVENOUS at 09:17

## 2019-09-26 RX ADMIN — LOPERAMIDE HYDROCHLORIDE 4 MG: 2 CAPSULE ORAL at 10:39

## 2019-09-26 RX ADMIN — LIDOCAINE HYDROCHLORIDE 60 MG: 20 INJECTION, SOLUTION EPIDURAL; INFILTRATION; INTRACAUDAL; PERINEURAL at 06:24

## 2019-09-26 RX ADMIN — PANCRELIPASE LIPASE, PANCRELIPASE PROTEASE, PANCRELIPASE AMYLASE 2 CAPSULE: 10000; 32000; 42000 CAPSULE, DELAYED RELEASE ORAL at 12:58

## 2019-09-26 RX ADMIN — LEVOFLOXACIN 750 MG: 5 INJECTION, SOLUTION INTRAVENOUS at 00:43

## 2019-09-26 RX ADMIN — SODIUM CHLORIDE 125 ML/HR: 900 INJECTION, SOLUTION INTRAVENOUS at 20:54

## 2019-09-26 RX ADMIN — PIPERACILLIN SODIUM,TAZOBACTAM SODIUM 3.38 G: 3; .375 INJECTION, POWDER, FOR SOLUTION INTRAVENOUS at 22:45

## 2019-09-26 RX ADMIN — ROCURONIUM BROMIDE 5 MG: 10 INJECTION, SOLUTION INTRAVENOUS at 06:24

## 2019-09-26 RX ADMIN — POLYMYXIN B SULFATE, BACITRACIN ZINC, NEOMYCIN SULFATE: 5000; 3.5; 4 OINTMENT TOPICAL at 09:34

## 2019-09-26 RX ADMIN — MIDAZOLAM HYDROCHLORIDE 2 MG: 1 INJECTION, SOLUTION INTRAMUSCULAR; INTRAVENOUS at 06:17

## 2019-09-26 RX ADMIN — VANCOMYCIN HYDROCHLORIDE 750 MG: 750 INJECTION, POWDER, LYOPHILIZED, FOR SOLUTION INTRAVENOUS at 23:27

## 2019-09-26 RX ADMIN — FENTANYL CITRATE 100 MCG: 50 INJECTION, SOLUTION INTRAMUSCULAR; INTRAVENOUS at 06:22

## 2019-09-26 RX ADMIN — PANCRELIPASE LIPASE, PANCRELIPASE PROTEASE, PANCRELIPASE AMYLASE 2 CAPSULE: 10000; 32000; 42000 CAPSULE, DELAYED RELEASE ORAL at 19:00

## 2019-09-26 NOTE — PROGRESS NOTES
Pharmacy Dosing Services: Vancomycin     Consult for Vancomycin Dosing by Pharmacy by Dr. Lee Ann Villarreal provided for this 71y.o. year old female , for indication of UTI (7 days). Day of Therapy 1    Ht Readings from Last 1 Encounters:   08/31/19 152.4 cm (60\")        Wt Readings from Last 1 Encounters:   09/25/19 43.1 kg (95 lb)        Other Current Antibiotics Levaquin + Zosyn    Significant Cultures Pending    Serum Creatinine Lab Results   Component Value Date/Time    Creatinine 0.78 09/25/2019 05:10 PM        Creatinine Clearance Estimated Creatinine Clearance: 46.3 mL/min (based on SCr of 0.78 mg/dL). BUN Lab Results   Component Value Date/Time    BUN 16 09/25/2019 05:10 PM        WBC Lab Results   Component Value Date/Time    WBC 11.5 09/25/2019 06:00 PM        H/H Lab Results   Component Value Date/Time    HGB 11.9 (L) 09/25/2019 06:00 PM        Platelets Lab Results   Component Value Date/Time    PLATELET 720 72/87/6294 06:00 PM        Temp 97.1 °F (36.2 °C)     Start Vancomycin therapy, with dose of 750 mg at 2300 9/25/19, every 24 hours. Dose calculated to approximate a therapeutic trough of 10-15 mcg/mL. Pharmacy to follow daily and will make changes to dose and/or frequency based on clinical status.     Pharmacist [id number] [facility] [address] Aure Sommers

## 2019-09-26 NOTE — PROGRESS NOTES
Pharmacy Dosing Services: Renal    Levaquin 750 mg IV every 24 hours was automatically dose-adjusted to Levaquin 750 mg IV every 48 hours per [facility] P&T Committee Protocol, with respect to renal function. Dosing provided for this   71 y.o. , female , for the indication of UTI (7 days). Pt Weight:   Wt Readings from Last 1 Encounters:   09/25/19 43.1 kg (95 lb)       Serum Creatinine Lab Results   Component Value Date/Time    Creatinine 0.78 09/25/2019 05:10 PM         Creatinine Clearance Estimated Creatinine Clearance: 46.3 mL/min (based on SCr of 0.78 mg/dL). BUN Lab Results   Component Value Date/Time    BUN 16 09/25/2019 05:10 PM           Pharmacy to continue to monitor patient daily. Will make dosage adjustments based upon changing renal function.   [address] information: [phone]

## 2019-09-26 NOTE — CONSULTS
Urology Consult    Subjective:     Date of Consultation:  September 25, 2019    Referring Physician: Josep Souza    Reason for Consultation:  Renal stone, Pyelonephritis    History of Present Illness:   Patient is a 71 y.o.  female who is being seen for renal stone and pyelonephritis. She was admitted to the hospital for Pyelitis [N12]  Ureterolithiasis [N20.1]. She has had 1 day of sharp right flank and RLQ pain. No prior h/o stones. + nausea/emesis. Poor PO intake. 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 No hematuria. No dysuria. Past Medical History:   Diagnosis Date    Asthma     Depression     Emphysema of lung (HCC)     Gastroparesis     Hypothyroidism     Malabsorption     of fat    Pancreatic insufficiency     Restless leg     bilateral      Past Surgical History:   Procedure Laterality Date    ABDOMEN SURGERY PROC UNLISTED      HX HERNIA REPAIR      HX SMALL BOWEL RESECTION      intra aortic baloon pump    HX TUBAL LIGATION        No family history on file. Social History     Tobacco Use    Smoking status: Former Smoker    Smokeless tobacco: Never Used   Substance Use Topics    Alcohol use: Never     Frequency: Never     No Known Allergies   Prior to Admission medications    Medication Sig Start Date End Date Taking? Authorizing Provider   montelukast (SINGULAIR) 10 mg tablet Take 4 mg by mouth daily. Yes Provider, Historical   multivitamin with iron tablet Take 1 Tab by mouth daily. Yes Provider, Historical   fluticasone propion-salmeterol (ADVAIR DISKUS) 500-50 mcg/dose diskus inhaler Take 1 Puff by inhalation every twelve (12) hours. Yes Provider, Historical   diphenhydrAMINE (BENADRYL ALLERGY) 25 mg tablet Take 25 mg by mouth as needed for Sleep. Yes Provider, Historical   HYDROcodone-acetaminophen (NORCO) 5-325 mg per tablet Take 1 Tab by mouth every four (4) hours as needed for Pain.    Yes Other, MD Aaron TEMAZEPAM PO Take 30 mg by mouth as needed. Yes Other, MD Aaron   ropinirole HCl (REQUIP PO) Take  by mouth as needed. Yes Other, MD Aaron   levothyroxine (SYNTHROID) 112 mcg tablet Take 88 mcg by mouth Daily (before breakfast). Yes Other, MD Aaron   loperamide (IMODIUM) 2 mg capsule Take 2 mg by mouth four (4) times daily. Yes Other, MD Aaron   DULoxetine (CYMBALTA) 20 mg capsule Take 60 mg by mouth daily. Yes Other, MD Aaron   albuterol-ipratropium (DUO-NEB) 2.5 mg-0.5 mg/3 ml nebu 3 mL by Nebulization route. Yes Other, MD Aaron   albuterol (PROVENTIL HFA, VENTOLIN HFA, PROAIR HFA) 90 mcg/actuation inhaler Take 1 Puff by inhalation every four (4) hours as needed for Wheezing. 4/19/19  Yes Bartolome Sánchez MD   methylPREDNISolone (MEDROL, WENDY,) 4 mg tablet Follow insert directions 9/1/19   Daniele Mayberry MD   cephALEXin [facility]) 500 mg capsule Take 1 Cap by mouth four (4) times daily. 9/1/19   Daniele Mayberry MD   lipase-protease-amylase (CREON) 12,000-38,000 -60,000 unit capsule Take 1 Cap by mouth four (4) times daily as needed. Indications: with snack    Provider, Historical   acetaminophen (TYLENOL EXTRA STRENGTH) 500 mg tablet Take 500 mg by mouth every four (4) hours as needed for Pain. Provider, Historical   guaiFENesin ER (MUCINEX) 600 mg ER tablet Take 1,200 mg by mouth as needed for Congestion. Provider, Historical   methylPREDNISolone (MEDROL, WENDY,) 4 mg tablet Us as directed 6/13/19   Diann Hall MD   nystatin-triamcinolone (MYCOLOG II) topical cream Apply  to affected area three (3) times daily. 4/21/19   KATHLEEN العراقي   lipase-protease-amylase (CREON) 12,000-38,000 -60,000 unit capsule Take 2 Caps by mouth three (3) times daily (with meals). Other, MD Aaron   buPROPion XL (WELLBUTRIN XL) 150 mg tablet Take 300 mg by mouth every morning. Other, MD Aaron   Cetirizine 10 mg cap Take 10 mg by mouth daily.     Aaron Ayala MD         Review of Systems: Constitutional: Negative for fever. HENT: Negative for congestion. Respiratory: Negative for cough and shortness of breath. Cardiovascular: Negative for chest pain. Gastrointestinal: Positive for abdominal pain, nausea and vomiting. Genitourinary: Positive for pelvic pain. Negative for dysuria. Musculoskeletal: Positive for back pain. Skin: Positive for wound (Chronic abdomen). All other systems reviewed and are negative.     Objective:     Patient Vitals for the past 8 hrs:   BP Temp Pulse Resp SpO2 Weight   19 2114 98/79 97.1 °F (36.2 °C) 85 18 98 %    198 100/62 97.7 °F (36.5 °C) 90 25 99 %    19 100/62  91 25 (!) 84 %    19 104/66  91 19 (!) 83 %    19 100/62  91 18 (!) 85 %    19 1939  98 °F (36.7 °C) 85 18     19 1630 90/49    100 %    19 1628 (!) 80/54 97.5 °F (36.4 °C) 93 16 100 % 43.1 kg (95 lb)     Temp (24hrs), Av.6 °F (36.4 °C), Min:97.1 °F (36.2 °C), Max:98 °F (36.7 °C)      Intake and Output:    0701 -  1900  In: 100 [I.V.:100]  Out: -     Physical Exam:  Gen - NAD, conversational, talkative, gaunt  HEENT - normocephalic atraumatic, mid line nasal septum, EOM intact, sclera anicteric  Dry mucous membranes, adentulous, supple neck  Resp - Breathing easy, no accessory muscle use  CV - RRR  Abd - draining fistula in upper midline with surrounding nontender erythema, _ RLQ tender  Back - + CVA tender   Ext - no edema    BMP:   Lab Results   Component Value Date/Time     2019 05:10 PM    K 4.2 2019 05:10 PM     2019 05:10 PM    CO2 26 2019 05:10 PM    AGAP 6 2019 05:10 PM     (H) 2019 05:10 PM    BUN 16 2019 05:10 PM    CREA 0.78 2019 05:10 PM    GFRAA >60 2019 05:10 PM    GFRNA >60 2019 05:10 PM            CBC WITH AUTOMATED DIFF    Collection Time: 19  6:00 PM   Result Value Ref Range    WBC 11.5 4.6 - 13.2 K/uL    RBC 4.29 4. 20 - 5.30 M/uL    HGB 11.9 (L) 12.0 - 16.0 g/dL    HCT 37.6 35.0 - 45.0 %    MCV 87.6 74.0 - 97.0 FL    MCH 27.7 24.0 - 34.0 PG    MCHC 31.6 31.0 - 37.0 g/dL    RDW 16.9 (H) 11.6 - 14.5 %    PLATELET 563 957 - 147 K/uL    MPV 9.7 9.2 - 11.8 FL    NEUTROPHILS 92 (H) 40 - 73 %    LYMPHOCYTES 5 (L) 21 - 52 %    MONOCYTES 3 3 - 10 %    EOSINOPHILS 0 0 - 5 %    BASOPHILS 0 0 - 2 %    ABS. NEUTROPHILS 10.4 (H) 1.8 - 8.0 K/UL    ABS. LYMPHOCYTES 0.6 (L) 0.9 - 3.6 K/UL    ABS. MONOCYTES 0.4 0.05 - 1.2 K/UL    ABS. EOSINOPHILS 0.0 0.0 - 0.4 K/UL    ABS. BASOPHILS 0.0 0.0 - 0.1 K/UL    DF AUTOMATED       CT - 9/25/19 --   1. There is severe right perirenal-retroperitoneal inflammatory stranding with  marked urothelial enhancement of the right renal pelvis and proximal ureter with  an associated nonobstructing calculus within the left renal pelvis which  measures approximately 8 mm in diameter. Findings suggest severe pyelitis.     2. Gallbladder is moderately distended but thin-walled. There is nonspecific  dilatation of the common bile duct, 9 mm in diameter. Assessment:     Her renal stone is likely intermittently obstructive. Given her pyelo, obstruction would be disastrous. She responded well to hydration IV. She is NPO. Plan: To the OR in morning for a right RPG, right stent placement. Continue broad spectrum abx with Zosyn.

## 2019-09-26 NOTE — PERIOP NOTES
TRANSFER - OUT REPORT:    Verbal report given to Wang Royal RN (name) on Nasrin  being transferred to [address] [place] [address](unit) for routine post - op       Report consisted of patients Situation, Background, Assessment and   Recommendations(SBAR). Information from the following report(s) SBAR, Intake/Output and MAR was reviewed with the receiving nurse. Lines:   Peripheral IV 09/25/19 Left Antecubital (Active)   Site Assessment Clean, dry, & intact 9/26/2019  3:34 AM   Phlebitis Assessment 0 9/26/2019  3:34 AM   Infiltration Assessment 0 9/26/2019  3:34 AM   Dressing Status Clean, dry, & intact 9/26/2019  3:34 AM   Dressing Type Transparent 9/26/2019  3:34 AM   Hub Color/Line Status Pink; Infusing 9/26/2019  3:34 AM   Action Taken Open ports on tubing capped 9/26/2019  3:34 AM   Alcohol Cap Used Yes 9/26/2019  3:34 AM       Peripheral IV 09/26/19 Right Hand (Active)   Site Assessment Clean, dry, & intact 9/26/2019  7:27 AM   Phlebitis Assessment 0 9/26/2019  7:27 AM   Infiltration Assessment 0 9/26/2019  7:27 AM   Dressing Status Clean, dry, & intact 9/26/2019  7:27 AM   Dressing Type Transparent;Tape 9/26/2019  7:27 AM   Hub Color/Line Status Blue; Infusing 9/26/2019  7:27 AM        Opportunity for questions and clarification was provided.       Patient transported with:   Monitor  Registered Nurse  Tech   2 L NC

## 2019-09-26 NOTE — ANESTHESIA PREPROCEDURE EVALUATION
Relevant Problems   No relevant active problems       Anesthetic History   No history of anesthetic complications            Review of Systems / Medical History  Patient summary reviewed, nursing notes reviewed and pertinent labs reviewed    Pulmonary    COPD        Asthma        Neuro/Psych         Psychiatric history     Cardiovascular  Within defined limits                Exercise tolerance: >4 METS     GI/Hepatic/Renal  Within defined limits              Endo/Other      Hypothyroidism       Other Findings              Physical Exam    Airway  Mallampati: III  TM Distance: < 4 cm  Neck ROM: decreased range of motion   Mouth opening: Diminished (comment)     Cardiovascular  Regular rate and rhythm,  S1 and S2 normal,  no murmur, click, rub, or gallop  Rhythm: regular  Rate: normal         Dental    Dentition: Edentulous     Pulmonary  Breath sounds clear to auscultation               Abdominal  GI exam deferred       Other Findings            Anesthetic Plan    ASA: 2  Anesthesia type: general          Induction: Intravenous  Anesthetic plan and risks discussed with: Patient

## 2019-09-26 NOTE — PROGRESS NOTES
Urology Progress Note    Patient: Delia Chaney MRN: [id number] SSN: [id number]    YOB: 1950  Age: 71 y.o. Sex: female    DOA: 2019 LOS:  LOS: 1 day              Subjective:   Pt feels better this morning but having \"dry heaves'. Objective:      Visit Vitals  /64   Pulse (!) 106   Temp 99.5 °F (37.5 °C)   Resp 18   Wt 43.1 kg (95 lb)   SpO2 90%   BMI 18.55 kg/m²     Temp (24hrs), Av.1 °F (36.7 °C), Min:97.1 °F (36.2 °C), Max:99.5 °F (37.5 °C)      Intake and Output:   07 - 0  In: 100 [I.V.:100]  Out: -    190 -  0700  In: 1000 [I.V.:1000]  Out: -     Lab/Data Reviewed:  BMP:   Lab Results   Component Value Date/Time     2019 04:05 AM    K 4.4 2019 04:05 AM     2019 04:05 AM    CO2 22 2019 04:05 AM    AGAP 9 2019 04:05 AM    GLU 98 2019 04:05 AM    BUN 13 2019 04:05 AM    CREA 0.61 2019 04:05 AM    GFRAA >60 2019 04:05 AM    GFRNA >60 2019 04:05 AM     CBC:   Lab Results   Component Value Date/Time    WBC 11.5 2019 06:00 PM    HGB 11.9 (L) 2019 06:00 PM    HCT 37.6 2019 06:00 PM     2019 06:00 PM       Medications Reviewed.     Assessment/Plan:   Principal Problem:    Pyelitis (2019)    Active Problems:    Enterocutaneous fistula (5/15/2019)      Ureterolithiasis (2019)        Status Post:  Procedure(s):  CYSTOSCOPY WITH RIGHT RETROGRADE PYELOGRAM, RIGHT URETERAL STENT PLACEMENT WITH C-ARM, PT IS IN ROOM 333   Impression: Pt is NPO she is scheduled for cysto RIGHT RPG and stent placement all risks including pain infection bleeding and stent irritation reviewed and she understands and wishes to proceed    Plan:  1. OR this morning    Kael Snyder MD  2019

## 2019-09-26 NOTE — PERIOP NOTES
Transferred back to [facility] in good condition, VSS;121/83,92,16, 100% on 2L. Anita Hinkle in to receive patient and place back on telemetry monitor. Family notified patient back to room.

## 2019-09-26 NOTE — PROGRESS NOTES
Hospitalist Progress Note    Patient: Mikey Lockwood MRN: [id number]  CSN: [id number]    YOB: 1950  Age: 71 y.o. Sex: female    DOA: 9/25/2019 LOS:  LOS: 1 day                Assessment/Plan     Patient Active Problem List   Diagnosis Code    Enterocutaneous fistula K63.2    Pyelonephritis N12    Ureterolithiasis N20.1    Pyelitis N12            70 y/o female w/ PMHX of emphysema, gastroparesis, malabsorption due to pancreatic insufficiency, multiple abdominal surgeries with a draining enterocutaneous fistula in upper adomen admitted for intermittently obstructive nephrolithiasis with severe pyelitis. Feels better, denies any complains. Pyelonephritis - continue with antibiotics, follow cultures. Urolithiasis - s/p cystoscopy with right retrograde pyelogram, right ureteral stent placement. Enterocutaneous fistula - follow up with wound care. Severe protein calorie malnutrition. DVT prophylaxis with SCD    Disposition : 1-2 days    Review of systems  General: No fevers or chills. Cardiovascular: No chest pain or pressure. No palpitations. Pulmonary: No shortness of breath. Gastrointestinal: No nausea, vomiting. Physical Exam:  General: Awake, cooperative, no acute distress    HEENT: NC, Atraumatic. PERRLA, anicteric sclerae. Lungs: CTA Bilaterally. No Wheezing/Rhonchi/Rales. Heart:  S1 S2,  No murmur, No Rubs, No Gallops  Abdomen: Soft, Non distended, Non tender.  +Bowel sounds, enterocutaneous fistula with dressing. Extremities: No c/c/e  Psych:   Not anxious or agitated. Neurologic:  No acute neurological deficit.            Vital signs/Intake and Output:  Visit Vitals  /57   Pulse 86   Temp 97.9 °F (36.6 °C)   Resp 16   Wt 43.1 kg (95 lb)   SpO2 100%   BMI 18.55 kg/m²     Current Shift:  09/26 0701 - 09/26 1900  In: 240 [P.O.:240]  Out: -   Last three shifts:  09/24 1901 - 09/26 0700  In: 1400 [I.V.:1400]  Out: -             Labs: Results: Chemistry Recent Labs     09/26/19  0405 09/25/19  1710   GLU 98 101*    138   K 4.4 4.2    106   CO2 22 26   BUN 13 16   CREA 0.61 0.78   CA 8.7 9.1   AGAP 9 6   BUCR 21* 21*   AP  --  183*   TP  --  6.7   ALB  --  2.9*   GLOB  --  3.8   AGRAT  --  0.8      CBC w/Diff Recent Labs     09/25/19  1800   WBC 11.5   RBC 4.29   HGB 11.9*   HCT 37.6      GRANS 92*   LYMPH 5*   EOS 0      Cardiac Enzymes Recent Labs     09/25/19  1710   CPK 30   CKND1 CALCULATION NOT PERFORMED WHEN RESULT IS BELOW LINEAR LIMIT      Coagulation No results for input(s): PTP, INR, APTT, INREXT in the last 72 hours. Lipid Panel No results found for: CHOL, CHOLPOCT, CHOLX, CHLST, CHOLV, 275525, HDL, HDLP, LDL, LDLC, DLDLP, 167477, VLDLC, VLDL, TGLX, TRIGL, TRIGP, TGLPOCT, CHHD, CHHDX   BNP No results for input(s): BNPP in the last 72 hours.    Liver Enzymes Recent Labs     09/25/19  1710   TP 6.7   ALB 2.9*   *   SGOT 41*      Thyroid Studies No results found for: T4, T3U, TSH, TSHEXT     Procedures/imaging: see electronic medical records for all procedures/Xrays and details which were not copied into this note but were reviewed prior to creation of Plan

## 2019-09-26 NOTE — PROGRESS NOTES
INITIAL NUTRITION ASSESSMENT     RECOMMENDATIONS/PLAN:   Supplement; add Ensure Enlive   -monitor labs/lytes, BM, wt, PO intake, fluid status, skin integrity. REASON FOR ASSESSMENT:     [] Positive Nutrition Screen:  [x] BMI <19  [] NPO/Clear Liquid Diet > 5 days (>3 days in ICU)  [] New Order for TPN    NUTRITION ASSESSMENT:   Client History: 71 yrs old Female admitted with pelvic and lower back pain, enterocutaneous fistula, pyelonephritis, ureterolithiasis, pyelities. Had CYSTOSCOPY WITH RIGHT RETROGRADE PYELOGRAM, RIGHT URETERAL STENT PLACEMENT WITH C-ARM today 9/26 per Physician note   PMHx: asthma, depression, emphysema of lung, gastroparesis, hypothyroidism, malabsorption of fat, pancreatic insufficiency, bilateral restless leg, multiple abdomen surgeries  Cultural/Congregational Food Preferences: None Identified    FOOD/NUTRITION HISTORY  Diet History: Unable to obtain, pt was on phone and refused NFPE. Food Allergies:  [x] NKFA    Pertinent PTA Medications: multivitamin w/ iron, synthyroid, imodium      NUTRITION INTAKE   Diet Order:  Regular      Average PO Intake:       Patient Vitals for the past 100 hrs:   % Diet Eaten   09/26/19 0917 10 %      Pertinent Medications:  [x] Reviewed; zofran, immodium, synthroid, B12  Electrolyte Replacement Protocol: []K  []Mg  []PO4    Insulin:  [] SSI  [] Pre-meal   []  Basal   [] Drip  [x] None  Pt expected to meet estimated nutrient needs through next review:          [x]  Yes  ANTHROPOMETRICS  Height:  5'       Weight: 43.1 kg (95 lb)    BMI:   18.55kg/m^2  -  underweight (Less than or = to 18.5% BMI)        Weight change: -6.8% loss in 6 mo                                 Comparison to Reference Standards:  IBW: 100 lbs      %IBW: 95%      AdjBW: n/a    NUTRITION-FOCUSED PHYSICAL ASSESSMENT  Skin: No PU.      GI: No BM    BIOCHEMICAL DATA & MEDICAL TESTS  Pertinent Labs:  [x] Reviewed;      NUTRITION PRESCRIPTION  Calories: 5703-6142 kcal/day based on 35-40  Protein: 65-86 g/day based on 1.5-2 g/kg  CHO: 189-219 g/day based on 50% of total energy  Fluid: 5207-0552 ml/day based on 1 kcal/ml      NUTRITION DIAGNOSES:   1. At risk of malnutrition as evidenced by inadequate oral intake related to 10% of diet eaten on 9/26 and BMI of 18.5. NUTRITION INTERVENTIONS:   INTERVENTIONS:        GOALS:  1. Supp: Add Ensure Enlive 1. Encourage PO intake >50% by next review 3 days     LEARNING NEEDS (Diet, Supplementation, Food/Nutrient-Drug Interaction):   [] None Identified  [] Inpatient education provided/documented    [] Identified and patient:  [] Declined     [x] Was not appropriate/indicated  NUTRITION MONITORING /EVALUATION:   Follow PO intake  Monitor wt  Monitor renal labs, electrolytes, fluid status  Monitor for additional supplement needs    [] Participated in Interdisciplinary Rounds  [x] [address] [place] [address] Reviewed/Documented  DISCHARGE NUTRITION RECOMMENDATIONS ADDRESSED:         [] To be determined closer to discharge    NUTRITION RISK:     [x]  At risk                     []  Not currently at risk     Will follow-up per policy.   Laura Bell 1

## 2019-09-26 NOTE — ED NOTES
TRANSFER - OUT REPORT:    Verbal report given to [facility] (name) on [employer]  being transferred to Telemetry (unit) for routine progression of care       Report consisted of patients Situation, Background, Assessment and   Recommendations(SBAR). Information from the following report(s) SBAR, ED Summary and MAR was reviewed with the receiving nurse. Lines:   Peripheral IV 09/25/19 Left Antecubital (Active)   Site Assessment Clean, dry, & intact 9/25/2019  7:44 PM   Phlebitis Assessment 0 9/25/2019  7:44 PM   Infiltration Assessment 0 9/25/2019  7:44 PM   Dressing Status Clean, dry, & intact 9/25/2019  7:44 PM   Dressing Type Transparent 9/25/2019  7:44 PM   Hub Color/Line Status Pink;Patent; Flushed 9/25/2019  7:44 PM        Opportunity for questions and clarification was provided.       Patient transported with:   [employer]

## 2019-09-26 NOTE — OP NOTES
Operative Report    Patient: Royal Nam MRN: [id number]  SSN: [id number]    YOB: 1950  Age: 71 y.o. Sex: female      Indications: This is a 71 y.o. female who presents with hypotension. She was noted to have a stone at RIGHT UPJ. Date of Surgery: 9/26/2019     Preoperative Diagnosis: Right Ureteral Stones    Postoperative Diagnosis: RIGHT URETERAL STONE    Surgeon(s) and Role:     Abiodun Souza MD - Primary     Anesthesia:  General     Procedure:  Procedure(s):  CYSTOSCOPY WITH RIGHT RETROGRADE PYELOGRAM, RIGHT URETERAL STENT PLACEMENT WITH C-ARM, PT IS IN ROOM 333    Procedure in Detail:  After anesthesia was administered, Royal Nam was prepped and draped in the usual sterile fashion. A 21 [place] sheath (Rigid) cystoscope was used. I intubated the right UO and using 10ml of Visiopaque performed RIGHT RPG, no hydro ureter noted mild fullness right collecting system. I then used a 0.035 wire and advance it through open ended catheter into collecting system. Then a 5 x 22 JJ stent was passed over wire into collecting system. Wire removed good coil in kidney and bladder. Bladder was emptied    Findings:  Anterior urethra: Normal without strictures  Bladder neck: open  Bladder mucosa: Intact  Turbeculation: none  Diverticula: none  Ureteral orifices: normal    Vital signs were stable and the patient was taken to the recovery room in good condition. Estimated Blood Loss:  none    Specimens: * No specimens in log *     Implants:   Implant Name Type Inv.  Item Serial No.  Lot No. LRB No. Used Action   Rosalilliama Brigittey PERCFLX [id number] -- PERCUFLEX - SN/  STENT URET PERCFLX [id number] -- PERCUFLEX N/ [employer] [facility] [id number] Right 1 Implanted

## 2019-09-26 NOTE — ROUTINE PROCESS
TRANSFER - IN REPORT: 
 
Verbal report received from CAITLYN Baumann RN(name) on [employer]  being received from ED(unit) for routine progression of care Report consisted of patients Situation, Background, Assessment and  
Recommendations(SBAR). Information from the following report(s) ED Summary and MAR was reviewed with the receiving nurse. Opportunity for questions and clarification was provided. Assessment completed upon patients arrival to unit and care assumed.

## 2019-09-26 NOTE — WOUND CARE
Patient seen by wound care for \"draining wound on chest (fistula). Area around fistula reddened and tender to touch. Hydrofera blue ring placed around fistula, followed by a moldable, convex wafer 1/2 - 7/8 in and a 1 3/4 in urostomy pouch. Patient states she is following up with [facility] wound care clinic once discharged.

## 2019-09-26 NOTE — H&P
History & Physical    Patient: Barb Hubbard MRN: [id number]  CSN: [id number]    YOB: 1950  Age: 71 y.o. 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 Pyelitis  -requested urology consult from ED  -plan for RPG and stent placement in AM  -NPO after MN  -bld and ur cx pending  -IV abx (zosyn/vanc)  -liberal IV hydration, maintain MAP>65    Fistula  -wound consult for enterocutaneous fistula (large amount of continuously draining fluid)    Dilated CBD  -RUQ U/S to eval CBD (dilation noted on CT but pt asymptomatic and LFTs WNL)    Prophy-SCDs, protonix, no heparin due to planned surgery in AM    Low threshold for ICU transfer due to low Bps. If she decompensates tonight, she will likely require emergent surgery as well as vasopressor support. Estimated length of stay : 3 nights    Alisia Holland MD  Nocturnist    --------------------------------------------------------------------------------------------------------------------------------------------------------------------    CC: R flank pain, fever, N/V       HPI:     Barb Hubbard is a 71 y.o. 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 No CP or SOB.      Past Medical History:   Diagnosis Date    Asthma     Depression     Emphysema of lung ([facility] [address]     Gastroparesis     Hypothyroidism     Malabsorption     of fat    Pancreatic insufficiency     Restless leg     bilateral       Past Surgical History:   Procedure Laterality Date    ABDOMEN SURGERY PROC UNLISTED      HX HERNIA REPAIR      HX SMALL BOWEL RESECTION      intra aortic baloon pump    HX TUBAL LIGATION         No family history on file. Social History     Socioeconomic History    Marital status:      Spouse name: Not on file    Number of children: Not on file    Years of education: Not on file    Highest education level: Not on file   Tobacco Use    Smoking status: Former Smoker    Smokeless tobacco: Never Used   Substance and Sexual Activity    Alcohol use: Never     Frequency: Never    Drug use: Not Currently       Prior to Admission medications    Medication Sig Start Date End Date Taking? Authorizing Provider   montelukast (SINGULAIR) 10 mg tablet Take 4 mg by mouth daily. Yes Provider, Historical   multivitamin with iron tablet Take 1 Tab by mouth daily. Yes Provider, Historical   fluticasone propion-salmeterol (ADVAIR DISKUS) 500-50 mcg/dose diskus inhaler Take 1 Puff by inhalation every twelve (12) hours. Yes Provider, Historical   diphenhydrAMINE (BENADRYL ALLERGY) 25 mg tablet Take 25 mg by mouth as needed for Sleep. Yes Provider, Historical   HYDROcodone-acetaminophen (NORCO) 5-325 mg per tablet Take 1 Tab by mouth every four (4) hours as needed for Pain. Yes Other, MD Aaron   TEMAZEPAM PO Take 30 mg by mouth as needed. Yes Other, MD Aaron   ropinirole HCl (REQUIP PO) Take  by mouth as needed. Yes Other, MD Aaron   levothyroxine (SYNTHROID) 112 mcg tablet Take 88 mcg by mouth Daily (before breakfast). Yes Other, MD Aaron   loperamide (IMODIUM) 2 mg capsule Take 2 mg by mouth four (4) times daily. Yes Other, MD Aaron   DULoxetine (CYMBALTA) 20 mg capsule Take 60 mg by mouth daily.    Yes Other, MD Aaron   albuterol-ipratropium (DUO-NEB) 2.5 mg-0.5 mg/3 ml nebu 3 mL by Nebulization route. Yes Aaron Ayala MD   albuterol (PROVENTIL HFA, VENTOLIN HFA, PROAIR HFA) 90 mcg/actuation inhaler Take 1 Puff by inhalation every four (4) hours as needed for Wheezing. 4/19/19  Yes Nabila Darden MD   methylPREDNISolone (MEDROL, WENDY,) 4 mg tablet Follow insert directions 9/1/19   Zuleyka Cardenas MD   cephALEXin [facility]) 500 mg capsule Take 1 Cap by mouth four (4) times daily. 9/1/19   Zuleyka Cardenas MD   lipase-protease-amylase (CREON) 12,000-38,000 -60,000 unit capsule Take 1 Cap by mouth four (4) times daily as needed. Indications: with snack    Provider, Historical   acetaminophen (TYLENOL EXTRA STRENGTH) 500 mg tablet Take 500 mg by mouth every four (4) hours as needed for Pain. Provider, Historical   guaiFENesin ER (MUCINEX) 600 mg ER tablet Take 1,200 mg by mouth as needed for Congestion. Provider, Historical   methylPREDNISolone (MEDROL, WENDY,) 4 mg tablet Us as directed 6/13/19   Adilia Hall MD   nystatin-triamcinolone (MYCOLOG II) topical cream Apply  to affected area three (3) times daily. 4/21/19   KATHLEEN Carrillo   lipase-protease-amylase (CREON) 12,000-38,000 -60,000 unit capsule Take 2 Caps by mouth three (3) times daily (with meals). Aaron Ayala MD   buPROPion XL (WELLBUTRIN XL) 150 mg tablet Take 300 mg by mouth every morning. Aaron Ayala MD   Cetirizine 10 mg cap Take 10 mg by mouth daily. Aaron Ayala MD       No Known Allergies    Review of Systems  Gen: +fever, chills, malaise, no weight loss/gain. Heent: No headache, rhinorrhea, epistaxis, ear pain, hearing loss, sinus pain, neck pain/stiffness, sore throat. Heart: No chest pain, palpitations, KAUR, pnd, or orthopnea. Resp: No cough, hemoptysis, wheezing and shortness of breath. GI: +nausea, vomiting, diarrhea (baseline); no constipation, melena or hematochezia. : No urinary obstruction, +dysuria and R flank pain, no hematuria. Derm: No rash, new skin lesion or pruritis. Musc/skeletal: no bone or joint complaints. Vasc: No edema, cyanosis or claudication. Endo: No heat/cold intolerance, no polyuria,polydipsia or polyphagia. Neuro: No unilateral weakness, numbness, tingling. No seizures. Heme: No easy bruising or bleeding. Physical Exam:     Physical Exam:  Visit Vitals  BP 95/55   Pulse 97   Temp 98.9 °F (37.2 °C)   Resp 17   Wt 43.1 kg (95 lb)   SpO2 91%   BMI 18.55 kg/m²      O2 Device: Room air    Temp (24hrs), Av.8 °F (36.6 °C), Min:97.1 °F (36.2 °C), Max:98.9 °F (37.2 °C)    1901 -  0700  In: 1000 [I.V.:1000]  Out: -     07 -  1900  In: 100 [I.V.:100]  Out: -     General:  Awake, cooperative, no distress. Head:  Normocephalic, without obvious abnormality, atraumatic. Eyes:  Conjunctivae/corneas clear, sclera anicteric. Neck: Supple, symmetrical, trachea midline. Lungs:   Clear to auscultation bilaterally. Heart:  Regular rate and rhythm, S1, S2 normal, no murmur, click, rub or gallop. Abdomen: Soft, ttp over R lower abdomen with radiation to the flank. No ttp in RUQ or Brumfield's sign. Bowel sounds normal. No masses,  No organomegaly. Extremities: Extremities normal, atraumatic, no cyanosis or edema. Capillary refill normal.   Pulses: 2+ and symmetric all extremities. Skin: Skin color pink, turgor normal. No rashes or lesions   Neurologic: No focal motor or sensory deficit. Labs Reviewed: All lab results for the last 24 hours reviewed.   Recent Results (from the past 24 hour(s))   METABOLIC PANEL, COMPREHENSIVE    Collection Time: 19  5:10 PM   Result Value Ref Range    Sodium 138 136 - 145 mmol/L    Potassium 4.2 3.5 - 5.5 mmol/L    Chloride 106 100 - 111 mmol/L    CO2 26 21 - 32 mmol/L    Anion gap 6 3.0 - 18 mmol/L    Glucose 101 (H) 74 - 99 mg/dL    BUN 16 7.0 - 18 MG/DL    Creatinine 0.78 0.6 - 1.3 MG/DL    BUN/Creatinine ratio 21 (H) 12 - 20      GFR est AA >60 >60 ml/min/1.73m2    GFR est non-AA >60 >60 ml/min/1.73m2    Calcium 9.1 8.5 - 10.1 MG/DL    Bilirubin, total 0.7 0.2 - 1.0 MG/DL    ALT (SGPT) 27 13 - 56 U/L    AST (SGOT) 41 (H) 10 - 38 U/L    Alk. phosphatase 183 (H) 45 - 117 U/L    Protein, total 6.7 6.4 - 8.2 g/dL    Albumin 2.9 (L) 3.4 - 5.0 g/dL    Globulin 3.8 2.0 - 4.0 g/dL    A-G Ratio 0.8 0.8 - 1.7     CARDIAC PANEL,(CK, CKMB & TROPONIN)    Collection Time: 09/25/19  5:10 PM   Result Value Ref Range    CK 30 26 - 192 U/L    CK - MB <1.0 <3.6 ng/ml    CK-MB Index  0.0 - 4.0 %     CALCULATION NOT PERFORMED WHEN RESULT IS BELOW LINEAR LIMIT    Troponin-I, QT <0.02 0.0 - 0.045 NG/ML   POC LACTIC ACID    Collection Time: 09/25/19  5:18 PM   Result Value Ref Range    Lactic Acid (POC) 2.85 (HH) 0.40 - 2.00 mmol/L   EKG, 12 LEAD, INITIAL    Collection Time: 09/25/19  5:37 PM   Result Value Ref Range    Ventricular Rate 75 BPM    Atrial Rate 75 BPM    P-R Interval 156 ms    QRS Duration 74 ms    Q-T Interval 404 ms    QTC Calculation (Bezet) 451 ms    Calculated P Axis 53 degrees    Calculated R Axis 51 degrees    Calculated T Axis 62 degrees    Diagnosis       Normal sinus rhythm  Normal ECG  When compared with ECG of 31-AUG-2019 13:14,  Nonspecific T wave abnormality no longer evident in Anterior leads     CBC WITH AUTOMATED DIFF    Collection Time: 09/25/19  6:00 PM   Result Value Ref Range    WBC 11.5 4.6 - 13.2 K/uL    RBC 4.29 4. 20 - 5.30 M/uL    HGB 11.9 (L) 12.0 - 16.0 g/dL    HCT 37.6 35.0 - 45.0 %    MCV 87.6 74.0 - 97.0 FL    MCH 27.7 24.0 - 34.0 PG    MCHC 31.6 31.0 - 37.0 g/dL    RDW 16.9 (H) 11.6 - 14.5 %    PLATELET 367 424 - 867 K/uL    MPV 9.7 9.2 - 11.8 FL    NEUTROPHILS 92 (H) 40 - 73 %    LYMPHOCYTES 5 (L) 21 - 52 %    MONOCYTES 3 3 - 10 %    EOSINOPHILS 0 0 - 5 %    BASOPHILS 0 0 - 2 %    ABS. NEUTROPHILS 10.4 (H) 1.8 - 8.0 K/UL    ABS. LYMPHOCYTES 0.6 (L) 0.9 - 3.6 K/UL    ABS. MONOCYTES 0.4 0.05 - 1.2 K/UL    ABS.  EOSINOPHILS 0.0 0.0 - 0.4 K/UL    ABS. BASOPHILS 0.0 0.0 - 0.1 K/UL    DF AUTOMATED     URINALYSIS W/ RFLX MICROSCOPIC    Collection Time: 09/25/19  6:30 PM   Result Value Ref Range    Color YELLOW      Appearance TURBID      Specific gravity 1.016 1.005 - 1.030      pH (UA) 6.0 5.0 - 8.0      Protein TRACE (A) NEG mg/dL    Glucose NEGATIVE  NEG mg/dL    Ketone 15 (A) NEG mg/dL    Bilirubin NEGATIVE  NEG      Blood MODERATE (A) NEG      Urobilinogen 1.0 0.2 - 1.0 EU/dL    Nitrites NEGATIVE  NEG      Leukocyte Esterase LARGE (A) NEG     URINE MICROSCOPIC ONLY    Collection Time: 09/25/19  6:30 PM   Result Value Ref Range    WBC TOO NUMEROUS TO COUNT 0 - 5 /hpf    RBC 5 to 10 0 - 5 /hpf    Epithelial cells FEW 0 - 5 /lpf    Bacteria 4+ (A) NEG /hpf   POC LACTIC ACID    Collection Time: 09/25/19  7:31 PM   Result Value Ref Range    Lactic Acid (POC) 1.49 0.40 - 2.00 mmol/L       Results   CT ABD PELV W CONT (Accession [id number]) (Order [id number])   Allergies      No Known Allergies   Exam Information     Status Exam Begun  Exam Ended    Final [99] 9/25/2019 18:09 9/25/2019 18:25   Result Information     Status: Final result (Exam End: 9/25/2019 18:25) Provider Status: Open   Study Result     EXAM: CT of the abdomen and pelvis     INDICATION: Abdominal pain, flank pain. Right-sided abdominal pain.     COMPARISON: 8/31/2019, 4/28/2019     TECHNIQUE: Axial CT imaging of the abdomen and pelvis was performed with  intravenous contrast. Multiplanar reformats were generated. One or more dose  reduction techniques were used on this CT: automated exposure control,  adjustment of the mAs and/or kVp according to patient size, and iterative  reconstruction techniques. The specific techniques used on this CT exam have  been documented in the patient's electronic medical record.  Digital Imaging and  Communications in the Medicine (DICOM) format image data are available to  nonaffiliated external healthcare facilities or entities on a secure, media  free, reciprocally searchable basis with patient authorization for at least a 12  month period after this study.     _______________     FINDINGS:     LOWER CHEST: Emphysematous changes. Regions of basilar scarring-atelectasis.     LIVER, BILIARY: Liver is normal. Gallbladder is moderately distended but  thin-walled. There is nonspecific dilatation of the common bile duct, 9 mm in  diameter.     PANCREAS: Normal.     SPLEEN: Normal.     ADRENALS: Normal.     KIDNEYS: There is severe right perirenal-retroperitoneal inflammatory stranding  with marked urothelial enhancement of the right renal pelvis and proximal ureter  with an associated nonobstructing calculus within the left renal pelvis which  measures approximately 8 mm in diameter. Kidneys enhance  symmetrically-homogenously.     LYMPH NODES: No enlarged lymph nodes.     GASTROINTESTINAL TRACT: No bowel obstruction or acute appearing wall thickening. Previous surgical anastomosis involving the stomach and bowel noted.     PELVIC ORGANS: Stable right pelvic ovoid cyst, 42 x 20 mm. .     VASCULATURE: Unremarkable.     BONES: Interval but remote appearing superior endplate compression deformity at  L4. No acute or aggressive osseous abnormalities identified.     OTHER: None.     _______________     IMPRESSION  IMPRESSION:     1. There is severe right perirenal-retroperitoneal inflammatory stranding with  marked urothelial enhancement of the right renal pelvis and proximal ureter with  an associated nonobstructing calculus within the left renal pelvis which  measures approximately 8 mm in diameter. Findings suggest severe pyelitis.     2. Gallbladder is moderately distended but thin-walled.  There is nonspecific  dilatation of the common bile duct, 9 mm in diameter.

## 2019-09-26 NOTE — PROGRESS NOTES
Pt requested to be DNR. Will relay message for MD to speak with pt. Pt c/o nausea. Nurse treated per mar.     0700 Bedside shift change report given to CAITLYN Knowles RN (oncoming nurse) by Saman Vinson. Ann Juarez RN (offgoing nurse). Report included the following information SBAR, Kardex and MAR.

## 2019-09-26 NOTE — ROUTINE PROCESS
0530 TRANSFER - OUT REPORT: 
 
Verbal report given to LEATHA Jacobsen RN(name) on [employer]  being transferred to Pre-op(unit) for ordered procedure Report consisted of patients Situation, Background, Assessment and  
Recommendations(SBAR). Information from the following report(s) SBAR, Kardex and MAR was reviewed with the receiving nurse. Lines:  
Peripheral IV 09/25/19 Left Antecubital (Active) Site Assessment Clean, dry, & intact 9/26/2019  3:34 AM  
Phlebitis Assessment 0 9/26/2019  3:34 AM  
Infiltration Assessment 0 9/26/2019  3:34 AM  
Dressing Status Clean, dry, & intact 9/26/2019  3:34 AM  
Dressing Type Transparent 9/26/2019  3:34 AM  
Hub Color/Line Status Pink; Infusing 9/26/2019  3:34 AM  
Action Taken Open ports on tubing capped 9/26/2019  3:34 AM  
Alcohol Cap Used Yes 9/26/2019  3:34 AM  
   
Peripheral IV 09/26/19 Right Hand (Active) Opportunity for questions and clarification was provided. Patient transported with: 
 Registered Nurse

## 2019-09-26 NOTE — PROGRESS NOTES
DC Plan: TBD    Chart reviewed. Pt admitted by hospitalist to tele. Wound care consult noted. Urology consult noted. Provider, please consider PT/OT eval orders to assist with dc planning. Pt does not have home oxygen, nursing please wean from home oxygen or chart and conduct walk test within 24 hours. CM will follow for transition of care needs. Reason for Admission:   Per H&P, pt is \"is a 71 y.o. 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 No CP or SOB. \"                  RRAT Score:     mod             Do you (patient/family) have any concerns for transition/discharge? Plan for utilizing home health:   TBD    Current Advanced Directive/Advance Care Plan:  Not on file.  Provider please consider palliative care/spiritual care consult to address ACP            Transition of Care Plan:    TBD      Care Management Interventions  Transition of Care Consult (CM Consult): Discharge Planning

## 2019-09-26 NOTE — PROGRESS NOTES
0740: Verbal shift change report given to ALAINA Tillman(oncoming nurse) by Michoacano Lorenzo (offgoing nurse). Report included the following information SBAR, Kardex, Intake/Output, MAR and Recent Results. 0800: shift assessment completed. See flowsheets. 1200: reassessment completed. See flowsheets. Having to change fistula dressing Q1 hr.   1510: Patient continuing to rip off fistula dressing and apply pressure. Dressing has been changed q 1 hour. Patient c/o burning sensation d/t stomach acid. Pro shield applied. 1600: reassessment completed. See flowsheets. Wound care in to see patient, changed dressing per recommendations. Will monitor. 1720: Patient wants Creon from home, daughter is bringing it. 1930: Verbal shift change report given to Estephania (oncoming nurse) by Janeth Washington (offgoing nurse). Report included the following information SBAR, Kardex, OR Summary, Intake/Output, MAR, Recent Results and Cardiac Rhythm NSR.

## 2019-09-27 LAB
ANION GAP SERPL CALC-SCNC: 7 MMOL/L (ref 3–18)
ATRIAL RATE: 115 BPM
ATRIAL RATE: 75 BPM
BUN SERPL-MCNC: 12 MG/DL (ref 7–18)
BUN/CREAT SERPL: 21 (ref 12–20)
CALCIUM SERPL-MCNC: 8.3 MG/DL (ref 8.5–10.1)
CALCULATED P AXIS, ECG09: 53 DEGREES
CALCULATED P AXIS, ECG09: 66 DEGREES
CALCULATED R AXIS, ECG10: 36 DEGREES
CALCULATED R AXIS, ECG10: 51 DEGREES
CALCULATED T AXIS, ECG11: 54 DEGREES
CALCULATED T AXIS, ECG11: 62 DEGREES
CHLORIDE SERPL-SCNC: 110 MMOL/L (ref 100–111)
CO2 SERPL-SCNC: 25 MMOL/L (ref 21–32)
CREAT SERPL-MCNC: 0.58 MG/DL (ref 0.6–1.3)
DIAGNOSIS, 93000: NORMAL
DIAGNOSIS, 93000: NORMAL
GLUCOSE SERPL-MCNC: 109 MG/DL (ref 74–99)
P-R INTERVAL, ECG05: 156 MS
P-R INTERVAL, ECG05: 174 MS
POTASSIUM SERPL-SCNC: 3.8 MMOL/L (ref 3.5–5.5)
Q-T INTERVAL, ECG07: 296 MS
Q-T INTERVAL, ECG07: 404 MS
QRS DURATION, ECG06: 74 MS
QRS DURATION, ECG06: 76 MS
QTC CALCULATION (BEZET), ECG08: 409 MS
QTC CALCULATION (BEZET), ECG08: 451 MS
SODIUM SERPL-SCNC: 142 MMOL/L (ref 136–145)
VENTRICULAR RATE, ECG03: 115 BPM
VENTRICULAR RATE, ECG03: 75 BPM

## 2019-09-27 PROCEDURE — 74011250636 HC RX REV CODE- 250/636: Performed by: UROLOGY

## 2019-09-27 PROCEDURE — 74011250637 HC RX REV CODE- 250/637: Performed by: UROLOGY

## 2019-09-27 PROCEDURE — 65660000000 HC RM CCU STEPDOWN

## 2019-09-27 PROCEDURE — 80048 BASIC METABOLIC PNL TOTAL CA: CPT

## 2019-09-27 PROCEDURE — 74011000258 HC RX REV CODE- 258: Performed by: UROLOGY

## 2019-09-27 PROCEDURE — 77010033678 HC OXYGEN DAILY

## 2019-09-27 PROCEDURE — 36415 COLL VENOUS BLD VENIPUNCTURE: CPT

## 2019-09-27 PROCEDURE — 97161 PT EVAL LOW COMPLEX 20 MIN: CPT

## 2019-09-27 RX ORDER — LEVOFLOXACIN 5 MG/ML
500 INJECTION, SOLUTION INTRAVENOUS EVERY 24 HOURS
Status: DISCONTINUED | OUTPATIENT
Start: 2019-09-27 | End: 2019-09-29

## 2019-09-27 RX ADMIN — LOPERAMIDE HYDROCHLORIDE 4 MG: 2 CAPSULE ORAL at 22:04

## 2019-09-27 RX ADMIN — UMECLIDINIUM 1 PUFF: 62.5 AEROSOL, POWDER ORAL at 09:06

## 2019-09-27 RX ADMIN — DULOXETINE HYDROCHLORIDE 60 MG: 60 CAPSULE, DELAYED RELEASE ORAL at 09:04

## 2019-09-27 RX ADMIN — MONTELUKAST 10 MG: 10 TABLET, FILM COATED ORAL at 22:04

## 2019-09-27 RX ADMIN — BUPROPION HYDROCHLORIDE 300 MG: 150 TABLET, EXTENDED RELEASE ORAL at 08:12

## 2019-09-27 RX ADMIN — PIPERACILLIN SODIUM,TAZOBACTAM SODIUM 3.38 G: 3; .375 INJECTION, POWDER, FOR SOLUTION INTRAVENOUS at 22:05

## 2019-09-27 RX ADMIN — PIPERACILLIN SODIUM,TAZOBACTAM SODIUM 3.38 G: 3; .375 INJECTION, POWDER, FOR SOLUTION INTRAVENOUS at 11:03

## 2019-09-27 RX ADMIN — LOPERAMIDE HYDROCHLORIDE 4 MG: 2 CAPSULE ORAL at 09:04

## 2019-09-27 RX ADMIN — VANCOMYCIN HYDROCHLORIDE 750 MG: 750 INJECTION, POWDER, LYOPHILIZED, FOR SOLUTION INTRAVENOUS at 22:59

## 2019-09-27 RX ADMIN — FLUTICASONE FUROATE AND VILANTEROL TRIFENATATE 1 PUFF: 200; 25 POWDER RESPIRATORY (INHALATION) at 09:06

## 2019-09-27 RX ADMIN — LEVOFLOXACIN 500 MG: 5 INJECTION, SOLUTION INTRAVENOUS at 11:58

## 2019-09-27 RX ADMIN — HYDROCODONE BITARTRATE AND ACETAMINOPHEN 1 TABLET: 5; 325 TABLET ORAL at 03:26

## 2019-09-27 RX ADMIN — TEMAZEPAM 30 MG: 15 CAPSULE ORAL at 03:41

## 2019-09-27 RX ADMIN — HYDROCODONE BITARTRATE AND ACETAMINOPHEN 1 TABLET: 5; 325 TABLET ORAL at 16:00

## 2019-09-27 RX ADMIN — PIPERACILLIN SODIUM,TAZOBACTAM SODIUM 3.38 G: 3; .375 INJECTION, POWDER, FOR SOLUTION INTRAVENOUS at 15:56

## 2019-09-27 RX ADMIN — LEVOTHYROXINE SODIUM 87.5 MCG: 25 TABLET ORAL at 08:12

## 2019-09-27 RX ADMIN — PIPERACILLIN SODIUM,TAZOBACTAM SODIUM 3.38 G: 3; .375 INJECTION, POWDER, FOR SOLUTION INTRAVENOUS at 03:26

## 2019-09-27 RX ADMIN — CYANOCOBALAMIN TAB 500 MCG 250 MCG: 500 TAB at 09:04

## 2019-09-27 NOTE — PROGRESS NOTES
Transition of care: anticipate d/c home tomorrow  Met with patient at bedside. Patient states she rents a room from her landlord. States she does have a daughter who lives nearby that takes her to appointments and will pick her up and drive her home when she is ready for d/c. Patient reports she does not see Dr. Ann Smiling she goes to [place], NP at [place]. She needs follow up with NP and Dr. Claudetta Mulch. Cms is aware and will assist. Patient has medicare or insurance. Patient plans to d/c home tomorrow. She denies needs for DME or use of them. Patient denies other needs from cm  Care Management Interventions  PCP Verified by CM: Yes  Transition of Care Consult (CM Consult): Discharge Planning  Current Support Network:  Other(lives with her landlord)  Confirm Follow Up Transport: Family(daughter)  Plan discussed with Pt/Family/Caregiver: Yes  Freedom of Choice Offered: Yes  Discharge Location  Discharge Placement: Home with family assistance

## 2019-09-27 NOTE — PROGRESS NOTES
NUTRITION UPDATE  Will add Ensure Enlive BID    Unable to do NFPE, pt was with a provider. Will follow up per policy.     Anel Rousseau RD  PAGER:  [phone]

## 2019-09-27 NOTE — PROGRESS NOTES
Urology Progress Note    Subjective:     Daily Progress Note: 2019 7:32 AM    Shweta Car is doing well. She denies any flank pain. No hematuria. No dysuria. No nausea. She feels almost normal.      Objective:     Visit Vitals  /67 (BP 1 Location: Right arm, BP Patient Position: At rest;Supine)   Pulse 75   Temp 98.1 °F (36.7 °C)   Resp 16   Wt 42.7 kg (94 lb 3.2 oz)   SpO2 100%   BMI 18.40 kg/m²        Temp (24hrs), Av.9 °F (36.6 °C), Min:97.7 °F (36.5 °C), Max:98.3 °F (36.8 °C)      Intake and Output:   1901 -  0700  In: 6108.8 [P.O.:240; I.V.:5868.8]  Out: 1450 [Urine:1450]  No intake/output data recorded.     Physical Exam:   NAD  RRR  Breathing easy  Abd - soft, nd, No CVA tendenrness, draining fistula with small surrounding erythema      Lab/Data Review:  BMP:   Lab Results   Component Value Date/Time     2019 03:20 AM    K 3.8 2019 03:20 AM     2019 03:20 AM    CO2 25 2019 03:20 AM    AGAP 7 2019 03:20 AM     (H) 2019 03:20 AM    BUN 12 2019 03:20 AM    CREA 0.58 (L) 2019 03:20 AM    GFRAA >60 2019 03:20 AM    GFRNA >60 2019 03:20 AM       Assessment/Plan:     Principal Problem:    Pyelitis (2019)    Active Problems:    Enterocutaneous fistula (5/15/2019)      Ureterolithiasis (2019)        Plan:   Discharge with culture directed abx once culture is back is okay from a  perspective  She will F/U with Christa Talavera on Friday 10/4/19 at 8am

## 2019-09-27 NOTE — PROGRESS NOTES
Pharmacy Dosing Services: Renal    Pharmacist Renal Dosing Progress Note for Levaquin     The following medication: Levaquin was automatically dose-adjusted per THE [facility] P&T Committee Protocol, with respect to renal function. Consult provided for this   71 y.o. , female , for the indication of UTI. Pt Weight:   Wt Readings from Last 1 Encounters:   09/27/19 42.7 kg (94 lb 3.2 oz)         Previous Regimen Levaquin 750mg IV q48h   Serum Creatinine Lab Results   Component Value Date/Time    Creatinine 0.58 (L) 09/27/2019 03:20 AM         Creatinine Clearance Estimated Creatinine Clearance: 51.1 mL/min (A) (by C-G formula based on SCr of 0.58 mg/dL (L)). BUN Lab Results   Component Value Date/Time    BUN 12 09/27/2019 03:20 AM         Dosage changed to:  Levaquin 500mg IV q24h    Pharmacy to continue to monitor patient daily. Will make dosage adjustments based upon changing renal function.   EFRAIN Mckinney Contact information: [phone]

## 2019-09-27 NOTE — PROGRESS NOTES
Problem: Falls - Risk of  Goal: *Absence of Falls  Description  Document Rylan Garcia Fall Risk and appropriate interventions in the flowsheet.   Outcome: Progressing Towards Goal  Note:   Fall Risk Interventions:  Mobility Interventions: Assess mobility with egress test, Communicate number of staff needed for ambulation/transfer, OT consult for ADLs, Patient to call before getting OOB, PT Consult for mobility concerns, PT Consult for assist device competence, Strengthening exercises (ROM-active/passive), Utilize walker, cane, or other assistive device              Elimination Interventions: Call light in reach, Patient to call for help with toileting needs, Stay With Me (per policy), Toilet paper/wipes in reach, Toileting schedule/hourly rounds              Problem: Patient Education: Go to Patient Education Activity  Goal: Patient/Family Education  Outcome: Progressing Towards Goal     Problem: General Infection Care Plan (Adult and Pediatric)  Goal: Improvement in signs and symptoms of infection  Outcome: Progressing Towards Goal  Goal: *Optimize nutritional status  Outcome: Not Progressing Towards Goal     Problem: Patient Education: Go to Patient Education Activity  Goal: Patient/Family Education  Outcome: Progressing Towards Goal     Problem: Pain  Goal: *Control of Pain  Outcome: Progressing Towards Goal     Problem: Patient Education: Go to Patient Education Activity  Goal: Patient/Family Education  Outcome: Progressing Towards Goal

## 2019-09-27 NOTE — PROGRESS NOTES
Hospitalist Progress Note    Patient: Hao Payton MRN: [id number]  CSN: [id number]    YOB: 1950  Age: 71 y.o. 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          Pyelonephritis - continue with antibiotics, follow culture results     Urolithiasis - s/p cystoscopy with right retrograde pyelogram, right ureteral stent placement.      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 nutrition consult     Severe protein calorie malnutrition. PT consult  Nutrition consult    Cleared for d/c per urology with cx directed abx-cx pending  Follow up with urology 10/4 set up        Disposition :home  Patient Active Problem List   Diagnosis Code    Enterocutaneous fistula K63.2    Pyelonephritis N12    Ureterolithiasis N20.1    Pyelitis N12       Subjective:    Feels better  No complaints of pain, nausea, fevers  Ready to get OOB    Review of systems:    Constitutional: denies fevers, chills, myalgias  Respiratory: denies SOB  Cardiovascular: denies chest pain  Gastrointestinal: denies nausea, vomiting, diarrhea      Vital signs/Intake and Output:  Visit Vitals  /67 (BP 1 Location: Right arm, BP Patient Position: At rest;Supine)   Pulse 75   Temp 98.1 °F (36.7 °C)   Resp 16   Wt 42.7 kg (94 lb 3.2 oz)   SpO2 100%   BMI 18.40 kg/m²     Current Shift:  09/27 0701 - 09/27 1900  In: -   Out: 500 [Urine:500]  Last three shifts:  09/25 1901 - 09/27 0700  In: 6108.8 [P.O.:240;  I.V.:5868.8]  Out: 1450 [Urine:1450]    Exam:    General: thin elderly appearing WF, alert, NAD, OX3  CVS:Regular rate and rhythm, no M/R/G, S1/S2 heard, no thrill  Lungs:Clear to auscultation bilaterally, no wheezes, rhonchi, or rales  Abdomen: Soft, fistula mid abdomen with small yellow serous d/c, mild redness surrounding, No distention, Normal Bowel sounds, No hepatomegaly  Extremities: No C/C/E, pulses palpable 2+  Neuro:grossly normal , follows commands  Psych:appropriate                Labs: Results:       Chemistry Recent Labs     09/27/19  0320 09/26/19  0405 09/25/19  1710   * 98 101*    138 138   K 3.8 4.4 4.2    107 106   CO2 25 22 26   BUN 12 13 16   CREA 0.58* 0.61 0.78   CA 8.3* 8.7 9.1   AGAP 7 9 6   BUCR 21* 21* 21*   AP  --   --  183*   TP  --   --  6.7   ALB  --   --  2.9*   GLOB  --   --  3.8   AGRAT  --   --  0.8      CBC w/Diff Recent Labs     09/25/19  1800   WBC 11.5   RBC 4.29   HGB 11.9*   HCT 37.6      GRANS 92*   LYMPH 5*   EOS 0      Cardiac Enzymes Recent Labs     09/25/19  1710   CPK 30   CKND1 CALCULATION NOT PERFORMED WHEN RESULT IS BELOW LINEAR LIMIT      Coagulation No results for input(s): PTP, INR, APTT, INREXT in the last 72 hours. Lipid Panel No results found for: CHOL, CHOLPOCT, CHOLX, CHLST, CHOLV, 354956, HDL, HDLP, LDL, LDLC, DLDLP, 117942, VLDLC, VLDL, TGLX, TRIGL, TRIGP, TGLPOCT, CHHD, CHHDX   BNP No results for input(s): BNPP in the last 72 hours.    Liver Enzymes Recent Labs     09/25/19  1710   TP 6.7   ALB 2.9*   *   SGOT 41*      Thyroid Studies No results found for: T4, T3U, TSH, TSHEXT     Procedures/imaging: see electronic medical records for all procedures/Xrays and details which were not copied into this note but were reviewed prior to creation of Nuno Rush MD

## 2019-09-27 NOTE — PROGRESS NOTES
Bedside and Verbal shift change report given to ANN-MARIE Sinha RN (oncoming nurse) by CELI Cates RN (offgoing nurse). Report included the following information SBAR, Kardex, Intake/Output, MAR and Recent Results.

## 2019-09-27 NOTE — PROGRESS NOTES
Problem: Mobility Impaired (Adult and Pediatric)  Goal: *Acute Goals and Plan of Care (Insert Text)  Description  Physical Therapy Goals   Initiated 9/27/2019 and to be accomplished within 3-5 day(s)  1. Patient will move from supine <> sit with S in prep for out of bed activity and change of position. 2.  Patient will perform sit<> stand with S with LRAD in prep for transfers/ambulation. 3.  Patient will transfer from bed <> chair with S with LRAD for time up in chair for completion of ADL activity. 4.  Patient will ambulate 150 feet with LRAD/S for improved functional mobility/safe discharge. 5.  Patient will ascend/descend 3-5 stairs with handrails with minimal assistance/contact guard assist for home re-entry as needed. Outcome: Progressing Towards Goal   PHYSICAL THERAPY EVALUATION    Patient: Nathaniel Montenegro (88 y.o. female)  Date: 9/27/2019  Primary Diagnosis: Pyelitis [N12]  Ureterolithiasis [N20.1]  Procedure(s) (LRB):  CYSTOSCOPY WITH RIGHT RETROGRADE PYELOGRAM, RIGHT URETERAL STENT PLACEMENT WITH C-ARM, PT IS IN ROOM 333 (Right) 1 Day Post-Op   Precautions:Fall    ASSESSMENT :  Based on the objective data described below, the patient presents with decrease independence w/ bed mobility, transfers, gait, and step negotiation. Pt seen in supine prior to session w/ supplemental O2 at 2 L and IV connected. Pt reported 8/10 back pain at this time. Pt reports PTA that she was I w/ mobility. Pt did not use home O2. Pt able to ambulate w/GB (GB over the chest secondary to fistula) w/o any difficulty and no LOB noted. Pt reported decrease activity tolerance secondary to back pain and was transferred back to supine in bed after session, call bell and tray in reach, all needs met, B/L SCDs donned, nurse notified after session. Patient will benefit from skilled intervention to address the above impairments.   Patients rehabilitation potential is considered to be Good  Factors which may influence rehabilitation potential include:   ? None noted  ? Mental ability/status  ? Medical condition  ? Home/family situation and support systems  ? Safety awareness  ? Pain tolerance/management  ? Other:      PLAN :  Recommendations and Planned Interventions:  ?           Bed Mobility Training             ? Neuromuscular Re-Education  ? Transfer Training                   ? Orthotic/Prosthetic Training  ? Gait Training                          ? Modalities  ? Therapeutic Exercises          ? Edema Management/Control  ? Therapeutic Activities            ? Patient and Family Training/Education  ? Other (comment):    Frequency/Duration: Patient will be followed by physical therapy 1-2 times per day to address goals. Discharge Recommendations: Home Health  Further Equipment Recommendations for Discharge: N/A     SUBJECTIVE:   Patient stated I feel okay, other than this back of mine.     OBJECTIVE DATA SUMMARY:     Past Medical History:   Diagnosis Date    Asthma     Depression     Emphysema of lung ([facility] [address]     Gastroparesis     Hypothyroidism     Malabsorption     of fat    Pancreatic insufficiency     Restless leg     bilateral     Past Surgical History:   Procedure Laterality Date    ABDOMEN SURGERY PROC UNLISTED      HX HERNIA REPAIR      HX SMALL BOWEL RESECTION      intra aortic baloon pump    HX TUBAL LIGATION       Barriers to Learning/Limitations: yes;  physical  Compensate with: Verbal Cues and Tactile Cues  Prior Level of Function/Home Situation:   Home Situation  Home Environment: Private residence  # Steps to Enter: 0  One/Two Story Residence: Two story  # of Interior Steps: 15  Interior Rails: Both  Living Alone: No  Support Systems: Friends \ neighbors  Patient Expects to be Discharged to[de-identified] Private residence  Current DME Used/Available at Home: None  Critical Behavior:  Neurologic State: Alert  Orientation Level: Oriented X4  Psychosocial  Purposeful Interaction: Yes  Pt Identified Daily Priority: Clinical issues (comment)  Caritas Process: Establish trust;Enable marcos/hope;Nurture loving kindness; Teaching/learning; Attend basic human needs;Create healing environment  Caring Interventions: Reassure; Therapeutic modalities  Reassure: Acceptance; Informing; Therapeutic listening; Instilling marcos and hope;Caring rounds  Therapeutic Modalities: Humor; Intentional therapeutic touch  Skin Condition/Temp: Warm;Dry  Skin Integrity: Wound (add Wound LDA)  Skin Integumentary  Skin Color: Appropriate for ethnicity  Skin Condition/Temp: Warm;Dry  Skin Integrity: Wound (add Wound LDA)  Turgor: Non-tenting  Hair Growth: Present  Varicosities: Absent  Strength:    Strength: Generally decreased, functional  Tone & Sensation:   Range Of Motion:  AROM: Generally decreased, functional  Functional Mobility:  Bed Mobility:  Supine to Sit: Supervision;Stand-by assistance  Sit to Supine: Supervision;Stand-by assistance  Scooting: Supervision  Transfers:  Sit to Stand: Contact guard assistance  Stand to Sit: Contact guard assistance  Balance:   Sitting: Intact  Standing: Intact  Ambulation/Gait Training:  Distance (ft): 35 Feet (ft)  Assistive Device: Gait belt  Ambulation - Level of Assistance: Contact guard assistance  Gait Description (WDL): Exceptions to WDL  Gait Abnormalities: Decreased step clearance  Base of Support: Narrowed  Speed/Alyssa: Slow  Step Length: Left shortened;Right shortened  Pain:  Pain Scale 1: Numeric (0 - 10)  Pain Intensity 1: 7  Pain Location 1: Back  Pain Orientation 1: Right  Pain Description 1: Aching  Pain Intervention(s) 1: Medication (see MAR)  Activity Tolerance:   Fair  Please refer to the flowsheet for vital signs taken during this treatment. After treatment:   ?         Patient left in no apparent distress sitting up in chair  ? Patient left in no apparent distress in bed  ? Call bell left within reach  ? Nursing notified  ? Caregiver present  ? Bed alarm activated    COMMUNICATION/EDUCATION:   ?         Fall prevention education was provided and the patient/caregiver indicated understanding. ? Patient/family have participated as able in goal setting and plan of care. ?         Patient/family agree to work toward stated goals and plan of care. ?         Patient understands intent and goals of therapy, but is neutral about his/her participation. ? Patient is unable to participate in goal setting and plan of care.     Thank you for this referral.  Ritesh Romero, PT   Time Calculation: 16 mins   Eval Complexity: History: HIGH Complexity :3+ comorbidities / personal factors will impact the outcome/ POC Exam:LOW Complexity : 1-2 Standardized tests and measures addressing body structure, function, activity limitation and / or participation in recreation  Presentation: LOW Complexity : Stable, uncomplicated  Clinical Decision Making:Low Complexity ambulate >30ft  Overall Complexity:LOW

## 2019-09-27 NOTE — ROUTINE PROCESS
Bedside shift change report given to Maura silver RN (oncoming nurse) by Sana Pearson RN (offgoing nurse). Report included the following information SBAR, Kardex, Intake/Output and MAR.

## 2019-09-28 LAB
ANION GAP SERPL CALC-SCNC: 7 MMOL/L (ref 3–18)
BACTERIA SPEC CULT: ABNORMAL
BUN SERPL-MCNC: 7 MG/DL (ref 7–18)
BUN/CREAT SERPL: 17 (ref 12–20)
CALCIUM SERPL-MCNC: 8 MG/DL (ref 8.5–10.1)
CHLORIDE SERPL-SCNC: 104 MMOL/L (ref 100–111)
CO2 SERPL-SCNC: 28 MMOL/L (ref 21–32)
CREAT SERPL-MCNC: 0.42 MG/DL (ref 0.6–1.3)
GLUCOSE SERPL-MCNC: 83 MG/DL (ref 74–99)
POTASSIUM SERPL-SCNC: 3.3 MMOL/L (ref 3.5–5.5)
SERVICE CMNT-IMP: ABNORMAL
SODIUM SERPL-SCNC: 139 MMOL/L (ref 136–145)

## 2019-09-28 PROCEDURE — 74011250637 HC RX REV CODE- 250/637: Performed by: UROLOGY

## 2019-09-28 PROCEDURE — 74011000258 HC RX REV CODE- 258: Performed by: UROLOGY

## 2019-09-28 PROCEDURE — 74011250636 HC RX REV CODE- 250/636: Performed by: UROLOGY

## 2019-09-28 PROCEDURE — 74011250637 HC RX REV CODE- 250/637: Performed by: INTERNAL MEDICINE

## 2019-09-28 PROCEDURE — 36415 COLL VENOUS BLD VENIPUNCTURE: CPT

## 2019-09-28 PROCEDURE — 65660000000 HC RM CCU STEPDOWN

## 2019-09-28 PROCEDURE — 77010033678 HC OXYGEN DAILY

## 2019-09-28 PROCEDURE — 80048 BASIC METABOLIC PNL TOTAL CA: CPT

## 2019-09-28 RX ORDER — POTASSIUM CHLORIDE 20 MEQ/1
40 TABLET, EXTENDED RELEASE ORAL
Status: COMPLETED | OUTPATIENT
Start: 2019-09-28 | End: 2019-09-28

## 2019-09-28 RX ADMIN — PIPERACILLIN SODIUM,TAZOBACTAM SODIUM 3.38 G: 3; .375 INJECTION, POWDER, FOR SOLUTION INTRAVENOUS at 05:00

## 2019-09-28 RX ADMIN — LEVOFLOXACIN 500 MG: 5 INJECTION, SOLUTION INTRAVENOUS at 10:56

## 2019-09-28 RX ADMIN — POTASSIUM CHLORIDE 40 MEQ: 20 TABLET, EXTENDED RELEASE ORAL at 08:42

## 2019-09-28 RX ADMIN — ONDANSETRON 4 MG: 2 INJECTION INTRAMUSCULAR; INTRAVENOUS at 11:59

## 2019-09-28 RX ADMIN — FLUTICASONE FUROATE AND VILANTEROL TRIFENATATE 1 PUFF: 200; 25 POWDER RESPIRATORY (INHALATION) at 08:43

## 2019-09-28 RX ADMIN — BUPROPION HYDROCHLORIDE 300 MG: 150 TABLET, EXTENDED RELEASE ORAL at 06:31

## 2019-09-28 RX ADMIN — LEVOTHYROXINE SODIUM 87.5 MCG: 25 TABLET ORAL at 06:31

## 2019-09-28 RX ADMIN — LOPERAMIDE HYDROCHLORIDE 4 MG: 2 CAPSULE ORAL at 08:42

## 2019-09-28 RX ADMIN — PIPERACILLIN SODIUM,TAZOBACTAM SODIUM 3.38 G: 3; .375 INJECTION, POWDER, FOR SOLUTION INTRAVENOUS at 09:37

## 2019-09-28 RX ADMIN — SODIUM CHLORIDE 125 ML/HR: 900 INJECTION, SOLUTION INTRAVENOUS at 07:26

## 2019-09-28 RX ADMIN — ROPINIROLE HYDROCHLORIDE 1 MG: 1 TABLET, FILM COATED ORAL at 21:44

## 2019-09-28 RX ADMIN — MONTELUKAST 10 MG: 10 TABLET, FILM COATED ORAL at 21:44

## 2019-09-28 RX ADMIN — CYANOCOBALAMIN TAB 500 MCG 250 MCG: 500 TAB at 08:42

## 2019-09-28 RX ADMIN — UMECLIDINIUM 1 PUFF: 62.5 AEROSOL, POWDER ORAL at 10:04

## 2019-09-28 RX ADMIN — DULOXETINE HYDROCHLORIDE 60 MG: 60 CAPSULE, DELAYED RELEASE ORAL at 08:42

## 2019-09-28 RX ADMIN — BENZOCAINE AND MENTHOL 1 LOZENGE: 15; 3.6 LOZENGE ORAL at 21:44

## 2019-09-28 NOTE — ROUTINE PROCESS
Bedside and Verbal shift change report given to Etta Guzmán RN (oncoming nurse) by Aixa Luis RN (offgoing nurse). Report included the following information SBAR and Kardex.

## 2019-09-28 NOTE — PROGRESS NOTES
Problem: Falls - Risk of  Goal: *Absence of Falls  Description  Document Godwin Key Fall Risk and appropriate interventions in the flowsheet.   Outcome: Progressing Towards Goal  Note:   Fall Risk Interventions:  Mobility Interventions: Patient to call before getting OOB              Elimination Interventions: Call light in reach

## 2019-09-28 NOTE — ROUTINE PROCESS
,Bedside and Verbal shift change report given to Omar Rivera RN (oncoming nurse) by Braluio Allison RN (offgoing nurse). Report included the following information SBAR and Kardex.

## 2019-09-28 NOTE — ROUTINE PROCESS
3:15 PM 
Bedside and Verbal shift change report given to Keena Villegas RN (oncoming nurse) by Carolynn Burnett RN (offgoing nurse). Report included the following information SBAR, Kardex and MAR.

## 2019-09-28 NOTE — PROGRESS NOTES
DC Plan: Discharge home with [facility], MD follow up, family assistance once medically stable. Anticipate dc in 24-48hrs. Chart reviewed as CM on call. Pt admitted to tele by hospitalist.  Met with pt at bedside to discuss dc plan. Pt states she lives with a roommate and daughter lives nearby. Home address confirmed per face sheet. Pt  States her daughter will drive home at discharge. PT recommending HH. FOC offered and pt chose [facility]. Referral placed. Pt denies having DME. Pt requesting rollator, no DME recommendations by PT. Will send order to [facility] to facilitate order for home delivery. Pt states her daughter has a wheelchair at her home, if ever needed. Pt also has on oxygen by NC, denies home oxygen use. Anticipate possible dc tomorrow. Primary RN Rio Tracys aware pt needs either to be weaned off oxygen or to conduct and chart walk test. Pt has PCP. No dc concerns identified. CM will cont to follow and will be available via hospital  on weekend. Care Management Interventions  PCP Verified by CM: Yes  Mode of Transport at Discharge: Other (see comment)(daughter)  Transition of Care Consult (CM Consult): [address]: Yes  Physical Therapy Consult: Yes  Current Support Network:  Other, Family Lives Nearby(lives with roommate)  Confirm Follow Up Transport: Family  Plan discussed with Pt/Family/Caregiver: Yes  Freedom of Choice Offered: Yes  Discharge Location  Discharge Placement: Home with home health

## 2019-09-28 NOTE — PROGRESS NOTES
1050:  Pt reports stomach is upset and wants to hold off on ambulation at this time. Will follow up again later for PT.      1335: 2nd attempt, pt is still with stomach upset and refusing PT. Will follow up tomorrow.

## 2019-09-28 NOTE — PROGRESS NOTES
Hospitalist Progress Note    Patient: Giana Chao MRN: [id number]  CSN: [id number]    YOB: 1950  Age: 71 y.o. Sex: female    DOA: 9/25/2019 LOS:  LOS: 3 days                Assessment and Plan:    Principal Problem:    Pyelitis (9/25/2019)    Active Problems:    Enterocutaneous fistula (5/15/2019)      Ureterolithiasis (9/25/2019)                 Pyelonephritis -  On day 3 of antibiotic, continue with antibiotics, follow culture results and narrow if possible       Urolithiasis - s/p cystoscopy with right retrograde pyelogram, right ureteral stent placement. OK for discharge from urology appointment after cx narrowed. 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 nutrition consult     Severe protein calorie malnutrition.     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 Subjective:    She feels better      Review of systems:    General: No fevers or chills. Cardiovascular: No chest pain or pressure. No palpitations. Pulmonary: No shortness of breath. Gastrointestinal: No nausea, vomiting. Objective:    Vital signs/Intake and Output:  Visit Vitals  /77 (BP 1 Location: Right arm, BP Patient Position: At rest)   Pulse 89   Temp 99.7 °F (37.6 °C)   Resp 16   Wt 42.6 kg (94 lb)   SpO2 99%   BMI 18.36 kg/m²     Current Shift:  No intake/output data recorded. Last three shifts:  09/26 1901 - 09/28 0700  In: 4964.6 [I.V.:4964.6]  Out: 3550 [Urine:3550]    Physical Exam:  General: NAD, AAOx3. Non-toxic. HEENT: NC/AT. PERRLA, EOMI.  MMM. Lungs: Nml inspection. CTA B/L. No wheezing, rales or rhonchi. Heart:  S1S2 RRR,  PMI mid 5th IC space. No M/RG. Abdomen: Soft, NT/ND.  BS+. No peritoneal signs. Extremities: No C/C/E. Psych:   Nml affect. Neurologic:  2-12 intact. Strength 5/5 throughout. Sensation symmetrical.          Labs: Results:       Chemistry Recent Labs     09/28/19  0335 09/27/19  0320 09/26/19  0405 09/25/19  1710   GLU 83 109* 98 101*    142 138 138   K 3.3* 3.8 4.4 4.2    110 107 106   CO2 28 25 22 26   BUN 7 12 13 16   CREA 0.42* 0.58* 0.61 0.78   CA 8.0* 8.3* 8.7 9.1   AGAP 7 7 9 6   BUCR 17 21* 21* 21*   AP  --   --   --  183*   TP  --   --   --  6.7   ALB  --   --   --  2.9*   GLOB  --   --   --  3.8   AGRAT  --   --   --  0.8      CBC w/Diff Recent Labs     09/25/19  1800   WBC 11.5   RBC 4.29   HGB 11.9*   HCT 37.6      GRANS 92*   LYMPH 5*   EOS 0      Cardiac Enzymes Recent Labs     09/25/19  1710   CPK 30   CKND1 CALCULATION NOT PERFORMED WHEN RESULT IS BELOW LINEAR LIMIT      Coagulation No results for input(s): PTP, INR, APTT, INREXT in the last 72 hours. Lipid Panel No results found for: CHOL, CHOLPOCT, CHOLX, CHLST, CHOLV, 869499, HDL, HDLP, LDL, LDLC, DLDLP, 671579, VLDLC, VLDL, TGLX, TRIGL, TRIGP, TGLPOCT, CHHD, CHHDX   BNP No results for input(s): BNPP in the last 72 hours.    Liver Enzymes Recent Labs     09/25/19  1710   TP 6.7   ALB 2.9*   *   SGOT 41*      Thyroid Studies No results found for: T4, T3U, TSH, TSHEXT     Procedures/imaging: see electronic medical records for all procedures/Xrays and details which were not copied into this note but were reviewed prior to creation of Plan

## 2019-09-28 NOTE — PROGRESS NOTES
1915 Pt received from offgoing nurse without any signs or symptoms of distress. Pt vitals are stable and within normal limits. Pt bed in low position with wheels locked and call bell within reach. 2032 Assessment completed and documented in flow sheet. Pt denies any further needs at this time. Pt in NAD with bed in low position, wheels locked and call bell within reach. 2204 Scheduled medications administered as ordered. Purposeful rounding completed. Pt resting quietly. No further needs voiced at this time. 2259 Scheduled medications administered as ordered. Purposeful rounding completed. Pt resting quietly. No further needs voiced at this time. 0002 Bedside and Verbal shift change report given to Dom Durham RN (oncoming nurse) by Parish Ma RN (offgoing nurse). Report included the following information SBAR, Intake/Output, MAR and Recent Results.

## 2019-09-28 NOTE — PROGRESS NOTES
5067  Pt is awake, alert, oriented x4. Lying in bed. Pt has a small fistula to adb. Mid abdomen is reddened. Fistula intermittently drains yellowish to greenish fluids. On O2 at  1.5 L nc.  0753   Pt was served breakfast.K level 3.3. Dr Minnie Antoine is aware. Ordered Potassium tab 40 meq x 1.  8:17 AM   Resting in bed. As per pt, she does not use O2 at home. Taken off O2 at this time. Will observe  Pt's breathing and O2 sat. Lungs are clear. Abdomen soft with active BS. No edema of lower extremities. Pedal pulses present. 0840  Pt's O2 is 88 but goes up to 94% on RA when asked to deep breathe. Nasal O2  applied at 1 L nc.  0855 AM  O2 sat 97% at 1 L nc. Dr Minnie Antoine aware  of pt's O2 sats and nasal o2 at 1L nc.  1101   Pt was seen by . Pt resting in bed. No complaints made.  made aware  O2 sat 88% on RA  and 7% at 1L nc. Will continue to wean pt off O2 before discharge. 1203   Pt complaining of nausea. No vomiting noted. Zofran 4 mg IV.  1309    Resting in bed. Nausea subsiding. 26  Pt  Refused to work with PT today because of upset stomach. 1419  Pt's O2 reduced to 0.5L via NC. Will recheck  O2 sat. 1519   O2 sat 96% at 0.5L nc.

## 2019-09-28 NOTE — ROUTINE PROCESS
Bedside shift change report given to [id number] Moore (oncoming nurse) by Edward Pitts RN (offgoing nurse). Report included the following information SBAR, Kardex, Intake/Output and MAR.

## 2019-09-28 NOTE — PROGRESS NOTES
0009-Assessment complete. Stable. Dressing to upper abdominal area (fistula) in place, yellowish drainage noted. No complaints, very pleasant. 0500-No change from initial assessment. Stable. Denies pain at this time. Shift Summary:  Uneventful shift. Very pleasant. Rested quietly throughout shift. Stable at shift change.

## 2019-09-29 ENCOUNTER — HOME HEALTH ADMISSION (OUTPATIENT)
Dept: HOME HEALTH SERVICES | Facility: HOME HEALTH | Age: 69
End: 2019-09-29
Payer: MEDICARE

## 2019-09-29 VITALS
DIASTOLIC BLOOD PRESSURE: 80 MMHG | WEIGHT: 94 LBS | SYSTOLIC BLOOD PRESSURE: 131 MMHG | BODY MASS INDEX: 18.36 KG/M2 | HEART RATE: 84 BPM | TEMPERATURE: 98.2 F | RESPIRATION RATE: 16 BRPM | OXYGEN SATURATION: 96 %

## 2019-09-29 PROCEDURE — 74011250637 HC RX REV CODE- 250/637: Performed by: UROLOGY

## 2019-09-29 PROCEDURE — 77010033678 HC OXYGEN DAILY

## 2019-09-29 PROCEDURE — 97116 GAIT TRAINING THERAPY: CPT

## 2019-09-29 PROCEDURE — 77030018836 HC SOL IRR NACL ICUM -A

## 2019-09-29 PROCEDURE — 74011250636 HC RX REV CODE- 250/636: Performed by: UROLOGY

## 2019-09-29 RX ORDER — LEVOFLOXACIN 500 MG/1
500 TABLET, FILM COATED ORAL EVERY 24 HOURS
Status: DISCONTINUED | OUTPATIENT
Start: 2019-09-29 | End: 2019-09-29 | Stop reason: HOSPADM

## 2019-09-29 RX ORDER — LEVOFLOXACIN 500 MG/1
500 TABLET, FILM COATED ORAL EVERY 24 HOURS
Qty: 7 TAB | Refills: 0 | Status: SHIPPED | OUTPATIENT
Start: 2019-09-29 | End: 2019-10-07

## 2019-09-29 RX ADMIN — LOPERAMIDE HYDROCHLORIDE 4 MG: 2 CAPSULE ORAL at 09:20

## 2019-09-29 RX ADMIN — SODIUM CHLORIDE 125 ML/HR: 900 INJECTION, SOLUTION INTRAVENOUS at 00:48

## 2019-09-29 RX ADMIN — DULOXETINE HYDROCHLORIDE 60 MG: 60 CAPSULE, DELAYED RELEASE ORAL at 09:20

## 2019-09-29 RX ADMIN — LEVOTHYROXINE SODIUM 87.5 MCG: 25 TABLET ORAL at 06:55

## 2019-09-29 RX ADMIN — FLUTICASONE FUROATE AND VILANTEROL TRIFENATATE 1 PUFF: 200; 25 POWDER RESPIRATORY (INHALATION) at 09:20

## 2019-09-29 RX ADMIN — BUPROPION HYDROCHLORIDE 300 MG: 150 TABLET, EXTENDED RELEASE ORAL at 06:55

## 2019-09-29 RX ADMIN — CYANOCOBALAMIN TAB 500 MCG 250 MCG: 500 TAB at 09:20

## 2019-09-29 RX ADMIN — UMECLIDINIUM 1 PUFF: 62.5 AEROSOL, POWDER ORAL at 09:20

## 2019-09-29 NOTE — PROGRESS NOTES
0715 Report received from Emi Oliver RN. Patient in satisfactory. Patient resting with eye open upon entry, no needs at this time. Dsg to abd CDI, gauze and tape. VS stable. Denies pain at this time. IS educated and encouraged. Shift POC reviewed with patient. Menu/meal times explained. Call/bell phone within reach. Will monitor for changes. 0920 Scheduled medications given. Patient ambulated to the bathroom. No assistance needed. 0945 IV infiltrated. Relayed to Dr. Shoshana Emery, pending discharge. Will make determination after rounding on patient. Patient completing abdominal dressing change at bedside, no assistance needed. 1040 Patient cleared for discharge. 97% on room air. IV removed. Cardiac monitoring removed. 1130 I have reviewed discharge instructions with the patient. The patient verbalized understanding. Discharge medications reviewed with patient and appropriate educational materials and side effects teaching were provided. Levaquin prescription provided. Patient specific medication (CREON) returned to patient. Armbands shredded. Awaiting ride. [facility] with Sanya Serrano, PT. Oxygen dipped during ambulation with PT session. Per Sanya Serrano, PT as low as 81%. At rest post ambulation rebounded to 95%. Erik, PT recommending rollator and PT for home, orders have already been input from CM standpoint. 1200 Oxygen rechecked 97% at rest on room. PT session reviewed with Dr. Shoshana Emery, urges patient to take it easy at home and follow up with Pulmonologist as soon as possible. Patient verbalized understanding.

## 2019-09-29 NOTE — DISCHARGE SUMMARY
Discharge Summary    Patient: Jennifer Anderson MRN: [id number]  CSN: [id number]    YOB: 1950  Age: 71 y.o. 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 Qty: 7 Tab, Refills: 0         CONTINUE these medications which have NOT CHANGED    Details   multivitamin with iron tablet Take 1 Tab by mouth daily. fluticasone propion-salmeterol (ADVAIR DISKUS) 500-50 mcg/dose diskus inhaler Take 1 Puff by inhalation every twelve (12) hours. diphenhydrAMINE (BENADRYL ALLERGY) 25 mg tablet Take 25 mg by mouth as needed for Sleep. HYDROcodone-acetaminophen (NORCO) 5-325 mg per tablet Take 1 Tab by mouth every four (4) hours as needed for Pain.      levothyroxine (SYNTHROID) 112 mcg tablet Take 88 mcg by mouth Daily (before breakfast). DULoxetine (CYMBALTA) 20 mg capsule Take 60 mg by mouth daily. albuterol-ipratropium (DUO-NEB) 2.5 mg-0.5 mg/3 ml nebu 3 mL by Nebulization route. albuterol (PROVENTIL HFA, VENTOLIN HFA, PROAIR HFA) 90 mcg/actuation inhaler Take 1 Puff by inhalation every four (4) hours as needed for Wheezing.   Qty: 1 Inhaler, Refills: 1      !! lipase-protease-amylase (CREON) 12,000-38,000 -60,000 unit capsule Take 1 Cap by mouth four (4) times daily as needed. Indications: with snack      acetaminophen (TYLENOL EXTRA STRENGTH) 500 mg tablet Take 500 mg by mouth every four (4) hours as needed for Pain.      guaiFENesin ER (MUCINEX) 600 mg ER tablet Take 1,200 mg by mouth as needed for Congestion. nystatin-triamcinolone (MYCOLOG II) topical cream Apply  to affected area three (3) times daily. Qty: 30 g, Refills: 2      !! lipase-protease-amylase (CREON) 12,000-38,000 -60,000 unit capsule Take 2 Caps by mouth three (3) times daily (with meals). buPROPion XL (WELLBUTRIN XL) 150 mg tablet Take 300 mg by mouth every morning. Cetirizine 10 mg cap Take 10 mg by mouth daily. !! - Potential duplicate medications found. Please discuss with provider. STOP taking these medications       hydrOXYzine HCl (ATARAX) 10 mg tablet Comments:   Reason for Stopping:         methylPREDNISolone (MEDROL, WENDY,) 4 mg tablet Comments:   Reason for Stopping:         cephALEXin (KEFLEX) 500 mg capsule Comments:   Reason for Stopping:         methylPREDNISolone (MEDROL, WENDY,) 4 mg tablet Comments:   Reason for Stopping:               Discharge Condition: stab;e    Procedures : cystoscopy with retrograde pyelogram and right ureteral stent placement    Consults:  Urology: dr. Marcellus Zelaya  /76 (BP 1 Location: Right arm, BP Patient Position: At rest)   Pulse 80   Temp 98.1 °F (36.7 °C)   Resp 16   Wt 42.6 kg (94 lb)   SpO2 96%   BMI 18.36 kg/m²     General: Awake, cooperative, no acute distress    HEENT: NC, Atraumatic. PERRLA, EOMI. Anicteric sclerae. Lungs:  CTA Bilaterally. No Wheezing/Rhonchi/Rales. Heart:  Regular  rhythm,  No murmur, No Rubs, No Gallops  Abdomen: Soft, Non distended, Non tender. +Bowel sounds,   Extremities: No c/c/e  Psych:   Not anxious or agitated. Neurologic:  No acute neurological deficits.                                      Admission HPI : Jean-Paul Badillo is a 71 y.o. 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 No CP or SOB. Hospital Course : Placed on IV antibiotics and seen in consult with urology. She slowly improved and cultures grew Klebsiella which was sensitive to Levaquin. She initially had an oxygen requirement but this resolved itself. She did desaturate with ambualtion but patient insisted on going home and says she will make her own pulmonary appointment upon discharge and take it easy. Activity:ad lindsay    Diet: regular    Follow-up: with dr. Wendy Steven on 10/4    Disposition: Home      Minutes spent on discharge: 35      Labs: Results:       Chemistry Recent Labs     09/28/19  0335 09/27/19  0320   GLU 83 109*    142   K 3.3* 3.8    110   CO2 28 25   BUN 7 12   CREA 0.42* 0.58*   CA 8.0* 8.3*   AGAP 7 7   BUCR 17 21*      CBC w/Diff No results for input(s): WBC, RBC, HGB, HCT, PLT, GRANS, LYMPH, EOS, HGBEXT, HCTEXT, PLTEXT, HGBEXT, HCTEXT, PLTEXT in the last 72 hours. Cardiac Enzymes No results for input(s): CPK, CKND1, JOSE in the last 72 hours. No lab exists for component: CKRMB, TROIP   Coagulation No results for input(s): PTP, INR, APTT, INREXT, INREXT in the last 72 hours. Lipid Panel No results found for: CHOL, CHOLPOCT, CHOLX, CHLST, CHOLV, 951521, HDL, HDLP, LDL, LDLC, DLDLP, 094235, VLDLC, VLDL, TGLX, TRIGL, TRIGP, TGLPOCT, CHHD, CHHDX   BNP No results for input(s): BNPP in the last 72 hours. Liver Enzymes No results for input(s): TP, ALB, TBIL, AP, SGOT, GPT in the last 72 hours.     No lab exists for component: DBIL   Thyroid Studies No results found for: T4, T3U, TSH, TSHEXT, TSHEXT         Significant Diagnostic Studies: Cta Chest W Or W Wo Cont    Result Date: 8/31/2019  EXAM: CTA chest INDICATION: Pain. COMPARISON: May 11, 2019. TECHNIQUE: Axial CT imaging from the thoracic inlet through the diaphragm with intravenous contrast. Coronal and sagittal MIP reformats were generated. One or more dose reduction techniques were used on this CT: automated exposure control, adjustment of the mAs and/or kVp according to patient size, and iterative reconstruction techniques. The specific techniques used on this CT exam have been documented in the patient's electronic medical record. Digital Imaging and Communications in Medicine (DICOM) format image data are available to nonaffiliated external healthcare facilities or entities on a secure, media free, reciprocally searchable basis with patient authorization for at least a 12-month period after this study. _______________ FINDINGS: EXAM QUALITY: Adequate PULMONARY ARTERIES: No evidence of pulmonary embolism. MEDIASTINUM: Normal heart size. No evidence of right heart strain. Aorta is unremarkable. No pericardial effusion. Moderate atherosclerotic vascular calcifications. LUNGS: Severe bilateral emphysematous changes. Stable left lower lobe spiculated pleural-based 1.2 cm nodule. PLEURA: Normal. AIRWAY: Normal. LYMPH NODES: No enlarged nodes. UPPER ABDOMEN: Unremarkable. OTHER: No acute or aggressive osseous abnormalities identified. _______________     IMPRESSION: 1. No evidence of pulmonary embolism. 2.  Stable left lower lobe pleural-based mass. Recommend follow-up according to the Fleischner's guidelines. 3. Emphysema. ======== Fleischner Society Pulmonary Nodule Guidelines (revised 2017): Solid nodule >8 mm: Consider CT, PET-CT, or tissue sampling at 3 months. Ct Abd Pelv W Cont    Result Date: 9/25/2019  EXAM: CT of the abdomen and pelvis INDICATION: Abdominal pain, flank pain. Right-sided abdominal pain.  COMPARISON: 8/31/2019, 4/28/2019 TECHNIQUE: Axial CT imaging of the abdomen and pelvis was performed with intravenous contrast. Multiplanar reformats were generated. One or more dose reduction techniques were used on this CT: automated exposure control, adjustment of the mAs and/or kVp according to patient size, and iterative reconstruction techniques. The specific techniques used on this CT exam have been documented in the patient's electronic medical record. Digital Imaging and Communications in the Medicine (DICOM) format image data are available to nonaffiliated external healthcare facilities or entities on a secure, media free, reciprocally searchable basis with patient authorization for at least a 12 month period after this study. _______________ FINDINGS: LOWER CHEST: Emphysematous changes. Regions of basilar scarring-atelectasis. LIVER, BILIARY: Liver is normal. Gallbladder is moderately distended but thin-walled. There is nonspecific dilatation of the common bile duct, 9 mm in diameter. PANCREAS: Normal. SPLEEN: Normal. ADRENALS: Normal. KIDNEYS: There is severe right perirenal-retroperitoneal inflammatory stranding with marked urothelial enhancement of the right renal pelvis and proximal ureter with an associated nonobstructing calculus within the left renal pelvis which measures approximately 8 mm in diameter. Kidneys enhance symmetrically-homogenously. LYMPH NODES: No enlarged lymph nodes. GASTROINTESTINAL TRACT: No bowel obstruction or acute appearing wall thickening. Previous surgical anastomosis involving the stomach and bowel noted. PELVIC ORGANS: Stable right pelvic ovoid cyst, 42 x 20 mm. Tresea Saw VASCULATURE: Unremarkable. BONES: Interval but remote appearing superior endplate compression deformity at L4. No acute or aggressive osseous abnormalities identified. OTHER: None. _______________     IMPRESSION: 1.  There is severe right perirenal-retroperitoneal inflammatory stranding with marked urothelial enhancement of the right renal pelvis and proximal ureter with an associated nonobstructing calculus within the left renal pelvis which measures approximately 8 mm in diameter. Findings suggest severe pyelitis. 2. Gallbladder is moderately distended but thin-walled. There is nonspecific dilatation of the common bile duct, 9 mm in diameter. Xr Chest Port    Result Date: 9/26/2019  A. P. portable chest: Indication: Chest Pain. Comparison 8/31/2019. The lungs are clear. The heart and vascularity are normal.     Impression: Negative portable chest.    Xr Chest Port    Result Date: 8/31/2019  EXAM: CHEST RADIOGRAPH CLINICAL INDICATION/HISTORY: SOB   > Additional: None COMPARISON: 5/10/2019. TECHNIQUE: Portable frontal view of the chest _______________ FINDINGS: SUPPORT DEVICES: None. HEART AND MEDIASTINUM: No appreciable cardiomegaly. Remaining mediastinal contours within normal limits. Aortic arch calcifications. LUNGS AND PLEURAL SPACES: Clear. No consolidation, mass or effusion. Hyperexpansion of the lungs. BONY THORAX AND SOFT TISSUES: Right shoulder anchor screw. _______________     IMPRESSION: No active cardiopulmonary disease.                 CC: Douglas Jimenez MD

## 2019-09-29 NOTE — PROGRESS NOTES
Problem: Mobility Impaired (Adult and Pediatric)  Goal: *Acute Goals and Plan of Care (Insert Text)  Description  Physical Therapy Goals   Initiated 9/27/2019 and to be accomplished within 3-5 day(s)  1. Patient will move from supine <> sit with S in prep for out of bed activity and change of position. 2.  Patient will perform sit<> stand with S with LRAD in prep for transfers/ambulation. 3.  Patient will transfer from bed <> chair with S with LRAD for time up in chair for completion of ADL activity. 4.  Patient will ambulate 150 feet with LRAD/S for improved functional mobility/safe discharge. 5.  Patient will ascend/descend 3-5 stairs with handrails with minimal assistance/contact guard assist for home re-entry as needed. Outcome: Progressing Towards Goal    PHYSICAL THERAPY TREATMENT    Patient: Adela Mendez (51 y.o. female)  Date: 9/29/2019  Diagnosis: Pyelitis [N12]  Ureterolithiasis [N20.1] Pyelitis  Procedure(s) (LRB):  CYSTOSCOPY WITH RIGHT RETROGRADE PYELOGRAM, RIGHT URETERAL STENT PLACEMENT WITH C-ARM, PT IS IN ROOM 333 (Right) 3 Days Post-Op  Precautions: Fall   Chart, physical therapy assessment, plan of care and goals were reviewed. ASSESSMENT:  Pt has progressed toward goals with independence in transfers noted. However, pt experienced 4 episodes of path deviations during GT 300ft with CGA/S and occasional HHA. Pt reports amb at home by furniture walking often. Recommend HHPT and AD as noted below for maximal safety upon discharge. Also note pt with mild SOB and O2 desat from 95% to 81% pre/post ambulation activity. O2 sat increases back to 95% within 1 minute. Pt reports having appt with pulmonologist last wk but was in hospital here and unable to make appt. Dr. Osuna More notified and aware of desat activity. Note pt also nego stairway with bilat HR's and CGA/S. Pt left seated EOB and awaiting discharge.   Progression toward goals:  ?      Improving appropriately and progressing toward goals  ? Improving slowly and progressing toward goals  ? Not making progress toward goals and plan of care will be adjusted     PLAN:  Patient continues to benefit from skilled intervention to address the above impairments. Continue treatment per established plan of care. Discharge Recommendations:  Home Health  Further Equipment Recommendations for Discharge:  straight cane vs rolling walker     SUBJECTIVE:   Patient stated I'm going home.     OBJECTIVE DATA SUMMARY:   Critical Behavior:  Neurologic State: Alert, Appropriate for age  Orientation Level: Oriented X4  Functional Mobility Training:  Transfers:  Sit to Stand: Independent  Stand to Sit: Independent  Balance:  Sitting: Intact  Standing: Impaired; Without support  Standing - Static: Good  Standing - Dynamic : Fair  Ambulation/Gait Training:  Distance (ft): 300 Feet (ft)  Assistive Device: Gait belt; Other (comment)(to [facility])  Ambulation - Level of Assistance: Contact guard assistance;Stand-by assistance  Gait Abnormalities: Altered arm swing;Decreased step clearance; Path deviations(arm swing decreased)  Base of Support: Narrowed  Speed/Alyssa: Slow  Step Length: Right shortened;Left shortened  Interventions: Safety awareness training;Verbal cues  Stairs:  Number of Stairs Trained: 11  Stairs - Level of Assistance: Supervision;Contact guard assistance  Rail Use: Both  Pain:  No pain reported  Activity Tolerance:   Fair   Please refer to the flowsheet for vital signs taken during this treatment. After treatment:   ? Patient left in no apparent distress sitting up EOB  ? Patient left in no apparent distress in bed  ? Call bell left within reach  ? Nursing notified  ? Caregiver present  ?  Bed alarm activated      Mini Lopez, NAOMI   Time Calculation: 18 mins

## 2019-09-29 NOTE — PROGRESS NOTES
Problem: Falls - Risk of  Goal: *Absence of Falls  Description  Document Donata Carrel Fall Risk and appropriate interventions in the flowsheet.   Outcome: Progressing Towards Goal  Note:   Fall Risk Interventions:  Mobility Interventions: Patient to call before getting OOB, Assess mobility with egress test, Bed/chair exit alarm         Medication Interventions: Patient to call before getting OOB, Teach patient to arise slowly    Elimination Interventions: Bed/chair exit alarm, Call light in reach, Patient to call for help with toileting needs              Problem: Patient Education: Go to Patient Education Activity  Goal: Patient/Family Education  Outcome: Progressing Towards Goal     Problem: General Infection Care Plan (Adult and Pediatric)  Goal: Improvement in signs and symptoms of infection  Outcome: Progressing Towards Goal  Goal: *Optimize nutritional status  Outcome: Progressing Towards Goal     Problem: Patient Education: Go to Patient Education Activity  Goal: Patient/Family Education  Outcome: Progressing Towards Goal     Problem: Pain  Goal: *Control of Pain  Outcome: Progressing Towards Goal     Problem: Patient Education: Go to Patient Education Activity  Goal: Patient/Family Education  Outcome: Progressing Towards Goal     Problem: Patient Education: Go to Patient Education Activity  Goal: Patient/Family Education  Outcome: Progressing Towards Goal

## 2019-09-29 NOTE — ROUTINE PROCESS
0700 Bedside shift change report given to Eloisa RN (oncoming nurse) by Hernando Bradshaw. Katerina Elliott RN (offgoing nurse). Report included the following information SBAR and Kardex.

## 2019-09-29 NOTE — PROGRESS NOTES
Problem: Falls - Risk of  Goal: *Absence of Falls  Description  Document Magnolia Hubbard Fall Risk and appropriate interventions in the flowsheet.   Outcome: Progressing Towards Goal  Note:   Fall Risk Interventions:  Mobility Interventions: Patient to call before getting OOB, Assess mobility with egress test, Bed/chair exit alarm  Medication Interventions: Patient to call before getting OOB, Teach patient to arise slowly  Elimination Interventions: Bed/chair exit alarm, Call light in reach, Patient to call for help with toileting needs      Problem: Pain  Goal: *Control of Pain  Outcome: Progressing Towards Goal

## 2019-10-01 ENCOUNTER — HOME CARE VISIT (OUTPATIENT)
Dept: HOME HEALTH SERVICES | Facility: HOME HEALTH | Age: 69
End: 2019-10-01

## 2019-10-01 ENCOUNTER — HOME CARE VISIT (OUTPATIENT)
Dept: SCHEDULING | Facility: HOME HEALTH | Age: 69
End: 2019-10-01
Payer: MEDICARE

## 2019-10-01 VITALS
WEIGHT: 104 LBS | SYSTOLIC BLOOD PRESSURE: 120 MMHG | DIASTOLIC BLOOD PRESSURE: 74 MMHG | HEART RATE: 92 BPM | OXYGEN SATURATION: 98 % | TEMPERATURE: 99.8 F | RESPIRATION RATE: 16 BRPM | BODY MASS INDEX: 20.96 KG/M2 | HEIGHT: 59 IN

## 2019-10-01 PROCEDURE — 400013 HH SOC

## 2019-10-01 PROCEDURE — 3331090002 HH PPS REVENUE DEBIT

## 2019-10-01 PROCEDURE — G0299 HHS/HOSPICE OF RN EA 15 MIN: HCPCS

## 2019-10-01 PROCEDURE — 3331090001 HH PPS REVENUE CREDIT

## 2019-10-02 ENCOUNTER — HOME CARE VISIT (OUTPATIENT)
Dept: HOME HEALTH SERVICES | Facility: HOME HEALTH | Age: 69
End: 2019-10-02
Payer: MEDICARE

## 2019-10-02 ENCOUNTER — HOME CARE VISIT (OUTPATIENT)
Dept: SCHEDULING | Facility: HOME HEALTH | Age: 69
End: 2019-10-02
Payer: MEDICARE

## 2019-10-02 VITALS
RESPIRATION RATE: 16 BRPM | OXYGEN SATURATION: 96 % | SYSTOLIC BLOOD PRESSURE: 98 MMHG | DIASTOLIC BLOOD PRESSURE: 75 MMHG | HEART RATE: 87 BPM | TEMPERATURE: 98 F

## 2019-10-02 PROCEDURE — 3331090002 HH PPS REVENUE DEBIT

## 2019-10-02 PROCEDURE — G0152 HHCP-SERV OF OT,EA 15 MIN: HCPCS

## 2019-10-02 PROCEDURE — A4450 NON-WATERPROOF TAPE: HCPCS

## 2019-10-02 PROCEDURE — A5120 SKIN BARRIER, WIPE OR SWAB: HCPCS

## 2019-10-02 PROCEDURE — G0151 HHCP-SERV OF PT,EA 15 MIN: HCPCS

## 2019-10-02 PROCEDURE — 3331090001 HH PPS REVENUE CREDIT

## 2019-10-02 PROCEDURE — A6216 NON-STERILE GAUZE<=16 SQ IN: HCPCS

## 2019-10-02 PROCEDURE — A9270 NON-COVERED ITEM OR SERVICE: HCPCS

## 2019-10-03 VITALS
HEART RATE: 81 BPM | SYSTOLIC BLOOD PRESSURE: 110 MMHG | TEMPERATURE: 99 F | OXYGEN SATURATION: 97 % | DIASTOLIC BLOOD PRESSURE: 74 MMHG

## 2019-10-03 PROCEDURE — 3331090001 HH PPS REVENUE CREDIT

## 2019-10-03 PROCEDURE — 3331090002 HH PPS REVENUE DEBIT

## 2019-10-04 ENCOUNTER — HOME CARE VISIT (OUTPATIENT)
Dept: SCHEDULING | Facility: HOME HEALTH | Age: 69
End: 2019-10-04
Payer: MEDICARE

## 2019-10-04 VITALS
DIASTOLIC BLOOD PRESSURE: 68 MMHG | HEART RATE: 72 BPM | TEMPERATURE: 98.6 F | SYSTOLIC BLOOD PRESSURE: 105 MMHG | OXYGEN SATURATION: 98 %

## 2019-10-04 PROCEDURE — 3331090002 HH PPS REVENUE DEBIT

## 2019-10-04 PROCEDURE — G0157 HHC PT ASSISTANT EA 15: HCPCS

## 2019-10-04 PROCEDURE — 3331090001 HH PPS REVENUE CREDIT

## 2019-10-04 PROCEDURE — G0152 HHCP-SERV OF OT,EA 15 MIN: HCPCS

## 2019-10-05 PROCEDURE — 3331090001 HH PPS REVENUE CREDIT

## 2019-10-05 PROCEDURE — 3331090002 HH PPS REVENUE DEBIT

## 2019-10-06 VITALS
HEART RATE: 76 BPM | OXYGEN SATURATION: 97 % | DIASTOLIC BLOOD PRESSURE: 65 MMHG | SYSTOLIC BLOOD PRESSURE: 112 MMHG | TEMPERATURE: 99.6 F

## 2019-10-06 PROCEDURE — 3331090002 HH PPS REVENUE DEBIT

## 2019-10-06 PROCEDURE — 3331090001 HH PPS REVENUE CREDIT

## 2019-10-07 ENCOUNTER — HOME CARE VISIT (OUTPATIENT)
Dept: HOME HEALTH SERVICES | Facility: HOME HEALTH | Age: 69
End: 2019-10-07
Payer: MEDICARE

## 2019-10-07 PROBLEM — N20.0 RENAL STONE: Status: ACTIVE | Noted: 2019-10-07

## 2019-10-07 PROCEDURE — 3331090002 HH PPS REVENUE DEBIT

## 2019-10-07 PROCEDURE — 3331090001 HH PPS REVENUE CREDIT

## 2019-10-08 ENCOUNTER — ANESTHESIA (OUTPATIENT)
Dept: SURGERY | Age: 69
DRG: 856 | End: 2019-10-08
Payer: MEDICARE

## 2019-10-08 ENCOUNTER — HOSPITAL ENCOUNTER (INPATIENT)
Age: 69
LOS: 7 days | Discharge: NURSING, REHAB OR LONG-TERM CARE | DRG: 856 | End: 2019-10-15
Attending: UROLOGY | Admitting: UROLOGY
Payer: MEDICARE

## 2019-10-08 ENCOUNTER — APPOINTMENT (OUTPATIENT)
Dept: GENERAL RADIOLOGY | Age: 69
DRG: 856 | End: 2019-10-08
Attending: UROLOGY
Payer: MEDICARE

## 2019-10-08 ENCOUNTER — ANESTHESIA EVENT (OUTPATIENT)
Dept: SURGERY | Age: 69
DRG: 856 | End: 2019-10-08
Payer: MEDICARE

## 2019-10-08 DIAGNOSIS — N20.0 RENAL STONE: Primary | ICD-10-CM

## 2019-10-08 PROBLEM — A41.9 SEPSIS (HCC): Status: ACTIVE | Noted: 2019-10-08

## 2019-10-08 LAB
BASOPHILS # BLD: 0 K/UL (ref 0–0.1)
BASOPHILS NFR BLD: 4 % (ref 0–3)
DIFFERENTIAL METHOD BLD: ABNORMAL
EOSINOPHIL # BLD: 0 K/UL (ref 0–0.4)
EOSINOPHIL NFR BLD: 2 % (ref 0–5)
ERYTHROCYTE [DISTWIDTH] IN BLOOD BY AUTOMATED COUNT: 17 % (ref 11.6–14.5)
HCT VFR BLD AUTO: 34.7 % (ref 35–45)
HGB BLD-MCNC: 10.9 G/DL (ref 12–16)
LACTATE SERPL-SCNC: 2.1 MMOL/L (ref 0.4–2)
LACTATE SERPL-SCNC: 4.8 MMOL/L (ref 0.4–2)
LYMPHOCYTES # BLD: 0.4 K/UL (ref 0.8–3.5)
LYMPHOCYTES NFR BLD: 34 % (ref 20–51)
MCH RBC QN AUTO: 27.3 PG (ref 24–34)
MCHC RBC AUTO-ENTMCNC: 31.4 G/DL (ref 31–37)
MCV RBC AUTO: 86.8 FL (ref 74–97)
MONOCYTES # BLD: 0 K/UL (ref 0–1)
MONOCYTES NFR BLD: 2 % (ref 2–9)
NEUTS BAND NFR BLD MANUAL: 18 % (ref 0–5)
NEUTS SEG # BLD: 0.4 K/UL (ref 1.8–8)
NEUTS SEG NFR BLD: 40 % (ref 42–75)
PLATELET # BLD AUTO: 342 K/UL (ref 135–420)
PMV BLD AUTO: 8.7 FL (ref 9.2–11.8)
RBC # BLD AUTO: 4 M/UL (ref 4.2–5.3)
RBC MORPH BLD: ABNORMAL
WBC # BLD AUTO: 1.1 K/UL (ref 4.6–13.2)

## 2019-10-08 PROCEDURE — 36415 COLL VENOUS BLD VENIPUNCTURE: CPT

## 2019-10-08 PROCEDURE — 77030018836 HC SOL IRR NACL ICUM -A: Performed by: UROLOGY

## 2019-10-08 PROCEDURE — C1894 INTRO/SHEATH, NON-LASER: HCPCS | Performed by: UROLOGY

## 2019-10-08 PROCEDURE — 74011250636 HC RX REV CODE- 250/636: Performed by: HOSPITALIST

## 2019-10-08 PROCEDURE — 74420 UROGRAPHY RTRGR +-KUB: CPT

## 2019-10-08 PROCEDURE — 83605 ASSAY OF LACTIC ACID: CPT

## 2019-10-08 PROCEDURE — 74011000250 HC RX REV CODE- 250

## 2019-10-08 PROCEDURE — 74011250637 HC RX REV CODE- 250/637: Performed by: ANESTHESIOLOGY

## 2019-10-08 PROCEDURE — 74011250637 HC RX REV CODE- 250/637: Performed by: INTERNAL MEDICINE

## 2019-10-08 PROCEDURE — 87040 BLOOD CULTURE FOR BACTERIA: CPT

## 2019-10-08 PROCEDURE — 77030020782 HC GWN BAIR PAWS FLX 3M -B: Performed by: UROLOGY

## 2019-10-08 PROCEDURE — 76210000006 HC OR PH I REC 0.5 TO 1 HR: Performed by: UROLOGY

## 2019-10-08 PROCEDURE — 85025 COMPLETE CBC W/AUTO DIFF WBC: CPT

## 2019-10-08 PROCEDURE — 0T768DZ DILATION OF RIGHT URETER WITH INTRALUMINAL DEVICE, VIA NATURAL OR ARTIFICIAL OPENING ENDOSCOPIC: ICD-10-PCS | Performed by: UROLOGY

## 2019-10-08 PROCEDURE — C1769 GUIDE WIRE: HCPCS | Performed by: UROLOGY

## 2019-10-08 PROCEDURE — C1758 CATHETER, URETERAL: HCPCS | Performed by: UROLOGY

## 2019-10-08 PROCEDURE — 74011250636 HC RX REV CODE- 250/636: Performed by: UROLOGY

## 2019-10-08 PROCEDURE — 74011000250 HC RX REV CODE- 250: Performed by: HOSPITALIST

## 2019-10-08 PROCEDURE — 3331090001 HH PPS REVENUE CREDIT

## 2019-10-08 PROCEDURE — 65610000006 HC RM INTENSIVE CARE

## 2019-10-08 PROCEDURE — 82360 CALCULUS ASSAY QUANT: CPT

## 2019-10-08 PROCEDURE — 76060000032 HC ANESTHESIA 0.5 TO 1 HR: Performed by: UROLOGY

## 2019-10-08 PROCEDURE — 0TC08ZZ EXTIRPATION OF MATTER FROM RIGHT KIDNEY, VIA NATURAL OR ARTIFICIAL OPENING ENDOSCOPIC: ICD-10-PCS | Performed by: UROLOGY

## 2019-10-08 PROCEDURE — 77010033678 HC OXYGEN DAILY

## 2019-10-08 PROCEDURE — 74011250636 HC RX REV CODE- 250/636: Performed by: INTERNAL MEDICINE

## 2019-10-08 PROCEDURE — 74011636320 HC RX REV CODE- 636/320: Performed by: UROLOGY

## 2019-10-08 PROCEDURE — 74011250636 HC RX REV CODE- 250/636

## 2019-10-08 PROCEDURE — BT1D1ZZ FLUOROSCOPY OF RIGHT KIDNEY, URETER AND BLADDER USING LOW OSMOLAR CONTRAST: ICD-10-PCS | Performed by: UROLOGY

## 2019-10-08 PROCEDURE — 77030006974 HC BSKT URET RTVR BSC -C: Performed by: UROLOGY

## 2019-10-08 PROCEDURE — 77030012508 HC MSK AIRWY LMA AMBU -A: Performed by: ANESTHESIOLOGY

## 2019-10-08 PROCEDURE — 77030040361 HC SLV COMPR DVT MDII -B: Performed by: UROLOGY

## 2019-10-08 PROCEDURE — 77030010103 HC SEAL PRT ENDOSC OCOA -B: Performed by: UROLOGY

## 2019-10-08 PROCEDURE — 0TP98DZ REMOVAL OF INTRALUMINAL DEVICE FROM URETER, VIA NATURAL OR ARTIFICIAL OPENING ENDOSCOPIC: ICD-10-PCS | Performed by: UROLOGY

## 2019-10-08 PROCEDURE — 87086 URINE CULTURE/COLONY COUNT: CPT

## 2019-10-08 PROCEDURE — 76010000138 HC OR TIME 0.5 TO 1 HR: Performed by: UROLOGY

## 2019-10-08 PROCEDURE — 74011250636 HC RX REV CODE- 250/636: Performed by: ANESTHESIOLOGY

## 2019-10-08 PROCEDURE — C2617 STENT, NON-COR, TEM W/O DEL: HCPCS | Performed by: UROLOGY

## 2019-10-08 PROCEDURE — 3331090002 HH PPS REVENUE DEBIT

## 2019-10-08 PROCEDURE — 74011000258 HC RX REV CODE- 258: Performed by: UROLOGY

## 2019-10-08 DEVICE — URETERAL STENT
Type: IMPLANTABLE DEVICE | Site: URETER | Status: FUNCTIONAL
Brand: POLARIS™ ULTRA

## 2019-10-08 RX ORDER — NYSTATIN AND TRIAMCINOLONE ACETONIDE 100000; 1 [USP'U]/G; MG/G
CREAM TOPICAL 3 TIMES DAILY
Status: DISCONTINUED | OUTPATIENT
Start: 2019-10-08 | End: 2019-10-15 | Stop reason: HOSPADM

## 2019-10-08 RX ORDER — ALBUTEROL SULFATE 90 UG/1
1 AEROSOL, METERED RESPIRATORY (INHALATION)
Status: DISCONTINUED | OUTPATIENT
Start: 2019-10-08 | End: 2019-10-15 | Stop reason: HOSPADM

## 2019-10-08 RX ORDER — ACETAMINOPHEN 10 MG/ML
1000 INJECTION, SOLUTION INTRAVENOUS ONCE
Status: COMPLETED | OUTPATIENT
Start: 2019-10-08 | End: 2019-10-09

## 2019-10-08 RX ORDER — FENTANYL CITRATE 50 UG/ML
INJECTION, SOLUTION INTRAMUSCULAR; INTRAVENOUS AS NEEDED
Status: DISCONTINUED | OUTPATIENT
Start: 2019-10-08 | End: 2019-10-08 | Stop reason: HOSPADM

## 2019-10-08 RX ORDER — BUPROPION HYDROCHLORIDE 150 MG/1
300 TABLET ORAL
Status: DISCONTINUED | OUTPATIENT
Start: 2019-10-09 | End: 2019-10-15 | Stop reason: HOSPADM

## 2019-10-08 RX ORDER — SODIUM CHLORIDE 0.9 % (FLUSH) 0.9 %
5-40 SYRINGE (ML) INJECTION EVERY 8 HOURS
Status: DISCONTINUED | OUTPATIENT
Start: 2019-10-08 | End: 2019-10-15 | Stop reason: HOSPADM

## 2019-10-08 RX ORDER — NALOXONE HYDROCHLORIDE 0.4 MG/ML
0.1 INJECTION, SOLUTION INTRAMUSCULAR; INTRAVENOUS; SUBCUTANEOUS
Status: DISCONTINUED | OUTPATIENT
Start: 2019-10-08 | End: 2019-10-08 | Stop reason: HOSPADM

## 2019-10-08 RX ORDER — MAGNESIUM SULFATE 100 %
4 CRYSTALS MISCELLANEOUS AS NEEDED
Status: DISCONTINUED | OUTPATIENT
Start: 2019-10-08 | End: 2019-10-08 | Stop reason: HOSPADM

## 2019-10-08 RX ORDER — LEVOFLOXACIN 5 MG/ML
500 INJECTION, SOLUTION INTRAVENOUS EVERY 24 HOURS
Status: DISCONTINUED | OUTPATIENT
Start: 2019-10-09 | End: 2019-10-08

## 2019-10-08 RX ORDER — LOPERAMIDE HYDROCHLORIDE 2 MG/1
2 CAPSULE ORAL
Status: DISCONTINUED | OUTPATIENT
Start: 2019-10-08 | End: 2019-10-12

## 2019-10-08 RX ORDER — SODIUM CHLORIDE 0.9 % (FLUSH) 0.9 %
5-40 SYRINGE (ML) INJECTION AS NEEDED
Status: DISCONTINUED | OUTPATIENT
Start: 2019-10-08 | End: 2019-10-15 | Stop reason: HOSPADM

## 2019-10-08 RX ORDER — INSULIN LISPRO 100 [IU]/ML
INJECTION, SOLUTION INTRAVENOUS; SUBCUTANEOUS ONCE
Status: DISCONTINUED | OUTPATIENT
Start: 2019-10-08 | End: 2019-10-08 | Stop reason: HOSPADM

## 2019-10-08 RX ORDER — HYDROCODONE BITARTRATE AND ACETAMINOPHEN 5; 325 MG/1; MG/1
1 TABLET ORAL
Qty: 20 TAB | Refills: 0 | Status: SHIPPED | OUTPATIENT
Start: 2019-10-08 | End: 2019-10-13

## 2019-10-08 RX ORDER — TEMAZEPAM 15 MG/1
30 CAPSULE ORAL
Status: DISCONTINUED | OUTPATIENT
Start: 2019-10-08 | End: 2019-10-15 | Stop reason: HOSPADM

## 2019-10-08 RX ORDER — SODIUM CHLORIDE 9 MG/ML
50 INJECTION, SOLUTION INTRAVENOUS CONTINUOUS
Status: DISCONTINUED | OUTPATIENT
Start: 2019-10-08 | End: 2019-10-15

## 2019-10-08 RX ORDER — MONTELUKAST SODIUM 10 MG/1
10 TABLET ORAL DAILY
Status: DISCONTINUED | OUTPATIENT
Start: 2019-10-09 | End: 2019-10-15 | Stop reason: HOSPADM

## 2019-10-08 RX ORDER — KETOROLAC TROMETHAMINE 30 MG/ML
INJECTION, SOLUTION INTRAMUSCULAR; INTRAVENOUS AS NEEDED
Status: DISCONTINUED | OUTPATIENT
Start: 2019-10-08 | End: 2019-10-08 | Stop reason: HOSPADM

## 2019-10-08 RX ORDER — IPRATROPIUM BROMIDE AND ALBUTEROL SULFATE 2.5; .5 MG/3ML; MG/3ML
3 SOLUTION RESPIRATORY (INHALATION)
Status: DISCONTINUED | OUTPATIENT
Start: 2019-10-08 | End: 2019-10-09

## 2019-10-08 RX ORDER — HYDROMORPHONE HYDROCHLORIDE 1 MG/ML
0.5 INJECTION, SOLUTION INTRAMUSCULAR; INTRAVENOUS; SUBCUTANEOUS
Status: DISCONTINUED | OUTPATIENT
Start: 2019-10-08 | End: 2019-10-08 | Stop reason: HOSPADM

## 2019-10-08 RX ORDER — LIDOCAINE HYDROCHLORIDE 20 MG/ML
INJECTION, SOLUTION EPIDURAL; INFILTRATION; INTRACAUDAL; PERINEURAL AS NEEDED
Status: DISCONTINUED | OUTPATIENT
Start: 2019-10-08 | End: 2019-10-08 | Stop reason: HOSPADM

## 2019-10-08 RX ORDER — ROPINIROLE 0.25 MG/1
1 TABLET, FILM COATED ORAL
Status: DISCONTINUED | OUTPATIENT
Start: 2019-10-08 | End: 2019-10-15 | Stop reason: HOSPADM

## 2019-10-08 RX ORDER — DULOXETIN HYDROCHLORIDE 60 MG/1
60 CAPSULE, DELAYED RELEASE ORAL DAILY
Status: DISCONTINUED | OUTPATIENT
Start: 2019-10-09 | End: 2019-10-15 | Stop reason: HOSPADM

## 2019-10-08 RX ORDER — GLYCOPYRROLATE 0.2 MG/ML
INJECTION INTRAMUSCULAR; INTRAVENOUS AS NEEDED
Status: DISCONTINUED | OUTPATIENT
Start: 2019-10-08 | End: 2019-10-08 | Stop reason: HOSPADM

## 2019-10-08 RX ORDER — ONDANSETRON 2 MG/ML
4 INJECTION INTRAMUSCULAR; INTRAVENOUS ONCE
Status: DISCONTINUED | OUTPATIENT
Start: 2019-10-08 | End: 2019-10-08 | Stop reason: HOSPADM

## 2019-10-08 RX ORDER — LEVOFLOXACIN 5 MG/ML
500 INJECTION, SOLUTION INTRAVENOUS EVERY 24 HOURS
Status: DISCONTINUED | OUTPATIENT
Start: 2019-10-08 | End: 2019-10-09

## 2019-10-08 RX ORDER — SODIUM CHLORIDE, SODIUM LACTATE, POTASSIUM CHLORIDE, CALCIUM CHLORIDE 600; 310; 30; 20 MG/100ML; MG/100ML; MG/100ML; MG/100ML
50 INJECTION, SOLUTION INTRAVENOUS CONTINUOUS
Status: DISCONTINUED | OUTPATIENT
Start: 2019-10-08 | End: 2019-10-08 | Stop reason: HOSPADM

## 2019-10-08 RX ORDER — OXYCODONE AND ACETAMINOPHEN 5; 325 MG/1; MG/1
1 TABLET ORAL AS NEEDED
Status: DISCONTINUED | OUTPATIENT
Start: 2019-10-08 | End: 2019-10-08 | Stop reason: HOSPADM

## 2019-10-08 RX ORDER — SODIUM CHLORIDE 9 MG/ML
125 INJECTION, SOLUTION INTRAVENOUS ONCE
Status: COMPLETED | OUTPATIENT
Start: 2019-10-08 | End: 2019-10-08

## 2019-10-08 RX ORDER — FENTANYL CITRATE 50 UG/ML
25 INJECTION, SOLUTION INTRAMUSCULAR; INTRAVENOUS
Status: DISCONTINUED | OUTPATIENT
Start: 2019-10-08 | End: 2019-10-08 | Stop reason: HOSPADM

## 2019-10-08 RX ORDER — ONDANSETRON 2 MG/ML
INJECTION INTRAMUSCULAR; INTRAVENOUS AS NEEDED
Status: DISCONTINUED | OUTPATIENT
Start: 2019-10-08 | End: 2019-10-08 | Stop reason: HOSPADM

## 2019-10-08 RX ORDER — ONDANSETRON 2 MG/ML
4 INJECTION INTRAMUSCULAR; INTRAVENOUS
Status: DISCONTINUED | OUTPATIENT
Start: 2019-10-08 | End: 2019-10-15 | Stop reason: HOSPADM

## 2019-10-08 RX ORDER — HYDROXYZINE 25 MG/1
25 TABLET, FILM COATED ORAL
Status: DISCONTINUED | OUTPATIENT
Start: 2019-10-08 | End: 2019-10-15 | Stop reason: HOSPADM

## 2019-10-08 RX ORDER — SODIUM CHLORIDE, SODIUM LACTATE, POTASSIUM CHLORIDE, CALCIUM CHLORIDE 600; 310; 30; 20 MG/100ML; MG/100ML; MG/100ML; MG/100ML
125 INJECTION, SOLUTION INTRAVENOUS CONTINUOUS
Status: DISCONTINUED | OUTPATIENT
Start: 2019-10-08 | End: 2019-10-08

## 2019-10-08 RX ORDER — PROPOFOL 10 MG/ML
INJECTION, EMULSION INTRAVENOUS AS NEEDED
Status: DISCONTINUED | OUTPATIENT
Start: 2019-10-08 | End: 2019-10-08 | Stop reason: HOSPADM

## 2019-10-08 RX ORDER — GUAIFENESIN 600 MG/1
1200 TABLET, EXTENDED RELEASE ORAL AS NEEDED
Status: DISCONTINUED | OUTPATIENT
Start: 2019-10-08 | End: 2019-10-15 | Stop reason: HOSPADM

## 2019-10-08 RX ORDER — LEVOFLOXACIN 5 MG/ML
500 INJECTION, SOLUTION INTRAVENOUS ONCE
Status: COMPLETED | OUTPATIENT
Start: 2019-10-08 | End: 2019-10-08

## 2019-10-08 RX ORDER — OXYCODONE AND ACETAMINOPHEN 5; 325 MG/1; MG/1
1-2 TABLET ORAL
Status: DISCONTINUED | OUTPATIENT
Start: 2019-10-08 | End: 2019-10-12

## 2019-10-08 RX ORDER — LEVOFLOXACIN 500 MG/1
500 TABLET, FILM COATED ORAL DAILY
Qty: 5 TAB | Refills: 0 | Status: SHIPPED | OUTPATIENT
Start: 2019-10-09 | End: 2019-10-14

## 2019-10-08 RX ADMIN — ACETAMINOPHEN 1000 MG: 10 INJECTION, SOLUTION INTRAVENOUS at 23:09

## 2019-10-08 RX ADMIN — LEVOFLOXACIN 500 MG: 5 INJECTION, SOLUTION INTRAVENOUS at 14:25

## 2019-10-08 RX ADMIN — FENTANYL CITRATE 50 MCG: 50 INJECTION, SOLUTION INTRAMUSCULAR; INTRAVENOUS at 14:32

## 2019-10-08 RX ADMIN — Medication 10 ML: at 22:00

## 2019-10-08 RX ADMIN — SODIUM CHLORIDE, SODIUM LACTATE, POTASSIUM CHLORIDE, AND CALCIUM CHLORIDE 125 ML/HR: 600; 310; 30; 20 INJECTION, SOLUTION INTRAVENOUS at 17:35

## 2019-10-08 RX ADMIN — SODIUM CHLORIDE 500 ML: 900 INJECTION, SOLUTION INTRAVENOUS at 22:01

## 2019-10-08 RX ADMIN — GLYCOPYRROLATE 0.2 MG: 0.2 INJECTION INTRAMUSCULAR; INTRAVENOUS at 14:28

## 2019-10-08 RX ADMIN — ONDANSETRON 4 MG: 2 INJECTION INTRAMUSCULAR; INTRAVENOUS at 23:05

## 2019-10-08 RX ADMIN — SODIUM CHLORIDE 125 ML/HR: 900 INJECTION, SOLUTION INTRAVENOUS at 18:04

## 2019-10-08 RX ADMIN — PANCRELIPASE LIPASE, PANCRELIPASE PROTEASE, PANCRELIPASE AMYLASE 1 CAPSULE: 10000; 32000; 42000 CAPSULE, DELAYED RELEASE ORAL at 23:20

## 2019-10-08 RX ADMIN — FENTANYL CITRATE 25 MCG: 50 INJECTION, SOLUTION INTRAMUSCULAR; INTRAVENOUS at 14:53

## 2019-10-08 RX ADMIN — SODIUM CHLORIDE, SODIUM LACTATE, POTASSIUM CHLORIDE, AND CALCIUM CHLORIDE 125 ML/HR: 600; 310; 30; 20 INJECTION, SOLUTION INTRAVENOUS at 13:30

## 2019-10-08 RX ADMIN — SODIUM CHLORIDE, SODIUM LACTATE, POTASSIUM CHLORIDE, AND CALCIUM CHLORIDE 125 ML/HR: 600; 310; 30; 20 INJECTION, SOLUTION INTRAVENOUS at 15:25

## 2019-10-08 RX ADMIN — ONDANSETRON 4 MG: 2 INJECTION INTRAMUSCULAR; INTRAVENOUS at 14:28

## 2019-10-08 RX ADMIN — SODIUM CHLORIDE, SODIUM LACTATE, POTASSIUM CHLORIDE, AND CALCIUM CHLORIDE 50 ML/HR: 600; 310; 30; 20 INJECTION, SOLUTION INTRAVENOUS at 16:00

## 2019-10-08 RX ADMIN — FENTANYL CITRATE 25 MCG: 50 INJECTION, SOLUTION INTRAMUSCULAR; INTRAVENOUS at 14:56

## 2019-10-08 RX ADMIN — LIDOCAINE HYDROCHLORIDE 60 MG: 20 INJECTION, SOLUTION EPIDURAL; INFILTRATION; INTRACAUDAL; PERINEURAL at 14:32

## 2019-10-08 RX ADMIN — SODIUM CHLORIDE, SODIUM LACTATE, POTASSIUM CHLORIDE, AND CALCIUM CHLORIDE 125 ML/HR: 600; 310; 30; 20 INJECTION, SOLUTION INTRAVENOUS at 19:49

## 2019-10-08 RX ADMIN — CEFTRIAXONE 1 G: 1 INJECTION, POWDER, FOR SOLUTION INTRAMUSCULAR; INTRAVENOUS at 18:04

## 2019-10-08 RX ADMIN — OXYCODONE HYDROCHLORIDE AND ACETAMINOPHEN 1 TABLET: 5; 325 TABLET ORAL at 16:28

## 2019-10-08 RX ADMIN — PROPOFOL 150 MG: 10 INJECTION, EMULSION INTRAVENOUS at 14:33

## 2019-10-08 RX ADMIN — PIPERACILLIN AND TAZOBACTAM 3.38 G: 3; .375 INJECTION, POWDER, LYOPHILIZED, FOR SOLUTION INTRAVENOUS at 21:59

## 2019-10-08 RX ADMIN — KETOROLAC TROMETHAMINE 30 MG: 30 INJECTION, SOLUTION INTRAMUSCULAR; INTRAVENOUS at 15:09

## 2019-10-08 RX ADMIN — SODIUM CHLORIDE, SODIUM LACTATE, POTASSIUM CHLORIDE, AND CALCIUM CHLORIDE 125 ML/HR: 600; 310; 30; 20 INJECTION, SOLUTION INTRAVENOUS at 18:15

## 2019-10-08 RX ADMIN — SODIUM CHLORIDE 125 ML/HR: 900 INJECTION, SOLUTION INTRAVENOUS at 22:01

## 2019-10-08 NOTE — DISCHARGE INSTRUCTIONS
Patient armband removed and shredded    Notify [facility] if unable to urinate urgency to urinate is not uncommon  If you have severe lower abdominal discomfort with little urine output  Bright red blood or clots in urine. Some red urine is not uncommon, but will decrease with increasing fluids  Fever or chills  Pain is not controlled. DISCHARGE SUMMARY from Nurse    PATIENT INSTRUCTIONS:    After general anesthesia or intravenous sedation, for 24 hours or while taking prescription Narcotics:  · Limit your activities  · Do not drive and operate hazardous machinery  · Do not make important personal or business decisions  · Do  not drink alcoholic beverages  · If you have not urinated within 8 hours after discharge, please contact your surgeon on call. Report the following to your surgeon:  · Excessive pain, swelling, redness or odor of or around the surgical area  · Temperature over 100.5  · Nausea and vomiting lasting longer than 4 hours or if unable to take medications  · Any signs of decreased circulation or nerve impairment to extremity: change in color, persistent  numbness, tingling, coldness or increase pain  · Any questions    What to do at Home:  Recommended activity: Activity as tolerated and no driving for today and Ambulate in house,     If you experience any of the following symptoms above, please follow up with Dr. Africa Mayberry. *  Please give a list of your current medications to your Primary Care Provider. *  Please update this list whenever your medications are discontinued, doses are      changed, or new medications (including over-the-counter products) are added. *  Please carry medication information at all times in case of emergency situations. These are general instructions for a healthy lifestyle:    No smoking/ No tobacco products/ Avoid exposure to second hand smoke  Surgeon General's Warning:  Quitting smoking now greatly reduces serious risk to your health.     Obesity, smoking, and sedentary lifestyle greatly increases your risk for illness    A healthy diet, regular physical exercise & weight monitoring are important for maintaining a healthy lifestyle    You may be retaining fluid if you have a history of heart failure or if you experience any of the following symptoms:  Weight gain of 3 pounds or more overnight or 5 pounds in a week, increased swelling in our hands or feet or shortness of breath while lying flat in bed. Please call your doctor as soon as you notice any of these symptoms; do not wait until your next office visit. The discharge information has been reviewed with the patient and caregiver. The patient and caregiver verbalized understanding. Discharge medications reviewed with the patient and caregiver and appropriate educational materials and side effects teaching were provided.   ___________________________________________________________________________________________________________________________________

## 2019-10-08 NOTE — PERIOP NOTES
Patient straight cath urine obtainted; urine pink but clear, patient tolerated procedure well. Ceftriaxone given as ordered. Awaiting lab and urine results.

## 2019-10-08 NOTE — PERIOP NOTES
Hospitalist paged again. No call back received from first page. No call back from Supervisor at this time.

## 2019-10-08 NOTE — INTERVAL H&P NOTE
H&P Update: 
Orestes Pruitt was seen and examined. History and physical has been reviewed. The patient has been examined.  There have been no significant clinical changes since the completion of the originally dated History and Physical.

## 2019-10-08 NOTE — BRIEF OP NOTE
BRIEF OPERATIVE NOTE    Date of Procedure: 10/8/2019   Preoperative Diagnosis: RENAL STONES  Postoperative Diagnosis: RENAL STONES    Procedure(s):  CYSTOSCOPY, RIGHT RETROGRADE PYELOGRAM WITH INTERPRETATION, RIGHT URETEROSCOPY LASER LITHOTRIPSY WITH HOLMIUM, STENT CHANGE WITH C-ARM, \"SPEC POP\"  Surgeon(s) and Role:     * Rella Hatchet, MD - Primary         Surgical Assistant: none    Surgical Staff:  Circ-1: Jerod Mace  Circ-2: Keiry Noguera RN  Circ-Relief: Kwadwo Morales RN  Radiology Technician: Grace Merida  Scrub Tech-1: Marci Ly  Scrub Tech-2: Turner Means Tech-Relief: Jovanny Mayes [facility]  Event Time In Time Out   Incision Start 1446    Incision Close 1511      Anesthesia: General   Estimated Blood Loss: minimal  Specimens:   ID Type Source Tests Collected by Time Destination   1 : RIGHT KIDNEY STONE [facility]  Rella Hatchet, MD 10/8/2019 1508 Pathology      Findings: stone in renal pelvis was moderately hard. fragmetned well   Complications: none  Implants:   Implant Name Type Inv.  Item Serial No.  Lot No. LRB No. Used Action   STENT Joshua Lowery - [id number]  [place] [employer] ULTRA [id number] -- [id number] Right 1 Implanted

## 2019-10-08 NOTE — PERIOP NOTES
Rapid response called for patient. Elevated temperature 100.9 and elevated heart rate. Dr. Africa Mayberry currently in [address] [place] [address], but aware of patient status and rapid response being called.

## 2019-10-08 NOTE — PROGRESS NOTES
1742 This writer responded to RRT. RN called due to concerns for Sepsis. Per staff Dr. Olga Allen aware and in OR. Pt received IV antibiotics preop and is receiving IVF per sepsis protocol at this time. Pt is awake, alert and oriented. Family member at bedside. Labs were drawn including lactic acid and BC. Pt is tachycardic hr 115 and temp 100.9. Dr. Eloy Guerrero responded and gave additional orders for urine culture and antibiotics. Pt remains in phase 2 PACU at this time but will be admitted. RN to follow up with Dr. Olga Allen for further orders and bed preference. See RRT narrator and MD orders for further information. Sukumar Lopez RN Nursing Supervisor.

## 2019-10-08 NOTE — PERIOP NOTES
Attempt to give report to ICU, instructed to call supervisor. Supervisor unaware as to why this occurred. Awaiting call back for further instruction.

## 2019-10-08 NOTE — PERIOP NOTES
TRANSFER - OUT REPORT:    Verbal report given to [address] [facility] [address] [employer]  being transferred to Phase II for routine progression of care       Report consisted of patients Situation, Background, Assessment and   Recommendations(SBAR). Information from the following report(s) SBAR was reviewed with the receiving nurse. Lines:   Peripheral IV 10/08/19 Right Forearm (Active)   Site Assessment Clean, dry, & intact 10/8/2019  3:50 PM   Phlebitis Assessment 0 10/8/2019  3:50 PM   Infiltration Assessment 0 10/8/2019  3:50 PM   Dressing Status Clean, dry, & intact 10/8/2019  3:50 PM   Dressing Type Transparent;Tape 10/8/2019  3:50 PM   Hub Color/Line Status Blue; Infusing;Patent 10/8/2019  3:50 PM   Action Taken Armboard 10/8/2019  2:32 PM      Visit Vitals  /81   Pulse 98   Temp 98.2 °F (36.8 °C)   Resp 19   Ht 4' 11\" (1.499 m)   Wt 41.3 kg (91 lb)   SpO2 98%   BMI 18.38 kg/m²       Intake/Output Summary (Last 24 hours) at 10/8/2019 1610  Last data filed at 10/8/2019 1550  Gross per 24 hour   Intake 1320 ml   Output 150 ml   Net 1170 ml       Opportunity for questions and clarification was provided.       Patient transported with:   Marlena Trujillo RN

## 2019-10-08 NOTE — PERIOP NOTES
Patient resting on stretcher, daughter at bedside, no needs at this time. Denies pain. ICU aware of admission and reading SBAR. Room 209.

## 2019-10-08 NOTE — PERIOP NOTES
Notified Dr. Lisa Glaser patient's K+ was 3.3 on 9/28/19 if he wants to be repeated, but stated not necessary.

## 2019-10-08 NOTE — PERIOP NOTES
ICU nurse, Irasema Rodriguez RN refusing to accept patient, states \"we dont have any staff to take care of this patient right now, we have a lot of critical patients down here. \" Nursing supervisor made aware. Will hold patient in PACU at this time. Hospitalist called back. Awating new orders.

## 2019-10-08 NOTE — ANESTHESIA PREPROCEDURE EVALUATION
Relevant Problems   No relevant active problems       Anesthetic History   No history of anesthetic complications            Review of Systems / Medical History  Patient summary reviewed, nursing notes reviewed and pertinent labs reviewed    Pulmonary    COPD        Asthma        Neuro/Psych         Psychiatric history     Cardiovascular  Within defined limits                     GI/Hepatic/Renal  Within defined limits              Endo/Other      Hypothyroidism       Other Findings              Physical Exam    Airway  Mallampati: II  TM Distance: 4 - 6 cm  Neck ROM: normal range of motion   Mouth opening: Normal     Cardiovascular  Regular rate and rhythm,  S1 and S2 normal,  no murmur, click, rub, or gallop             Dental  No notable dental hx       Pulmonary  Breath sounds clear to auscultation               Abdominal  GI exam deferred       Other Findings            Anesthetic Plan    ASA: 2  Anesthesia type: general          Induction: Intravenous  Anesthetic plan and risks discussed with: Patient

## 2019-10-08 NOTE — H&P
Urology [address]  [address]  [address]  Tel: [phone]  Fax: [phone]        Patient: Jose Fletcher  YOB: 1950          Completed Orders (this encounter)  Order Interpretation Result Next Lab Date   URINALYSIS, AUTO, W/O SCOPE see detail Test 1Color: yellow. Clarity: clear. Glucose: negative. Bilirubin: negative. Ketones: negative. Blood: large. pH: 6.5. Protein: 100 mg/dl. Urobilinogen: 0.2 mg/dl  Nitrite: negative. Leukocytes: trace. Assessment/Plan  # Detail Type Description    1. Assessment Kidney stone (N20.0). Patient Plan She has an 8mm stone in the renal pelvis and had a stent placed urgently last wk. She is doing great. We discussed ESWL vs URS laser litho with stent change. She wishes to proceed with cysto, right RPG, right URS with Holmium laser litho and stent change. Risk of bleeding, infection, and possible need for more than 1 procedure to remain stone free were discussed. She will proceed. Plan Orders Urine Culture, Routine to be performed. The patient had the following test(s) completed today: URINALYSIS, AUTO, W/O SCOPE. 2. Assessment Acute pyelonephritis (N10). Patient Plan She seems to be doing well. Her urine is sent for culture again today. This 71year old female presents for Kidney and/or Ureteral Stone and UTI. History of Present Illness:  1. Kidney and/or Ureteral Stone   Onset was 1 week ago. Severity level is severe. It occurs intermittently. The problem is improving. Location of pain is left flank. The pain does not radiate. Pertinent negatives include chills, constipation, diarrhea, fever, nausea and vomiting. Additional information: She ahd a right pyelonephritis with an 8mm renal pelvis stone and a stent was placed urgently on 9/26/19. She feels much better now with less pain. .      2.  UTI   The onset was 1 week ago. The problem is severe. The problem is improving. Presenting/Initial symptoms include burning, fever and flank pain. Pertinent history includes being over 50. Pertinent history does not include alcohol consumption. Symptoms are relieved by Abx. Pertinent negatives include constipation. Additional information: Klebsiella (9/25/19) urine culture. She has been on Levaquin and is getting better. PROBLEM LIST:   Problem List reviewed.    Problem Description Onset Date Chronic Clinical Status Notes   Kidney stone  N           Medications (active prior to today)  Medication Instructions Start Date Stop Date Refilled Elsewhere   montelukast 4 mg chewable tablet  //   Y   duloxetine 20 mg capsule,delayed release take 1 capsule by oral route 2 times every day //   Y   albuterol sulfate concentrate 2.5 mg/0.5 mL solution for nebulization inhale 0.5 milliliter by nebulization route 3 times every day //   Y   albuterol sulfate HFA 90 mcg/actuation aerosol inhaler inhale 2 puff by inhalation route  every 4 - 6 hours as needed //   Y   cephalexin 500 mg capsule take 1 capsule by ORAL route  every 12 hours //   Y   acetaminophen 500 mg tablet take 2 tablet by oral route  every 6 hours as needed //   Y   bupropion HCl  mg tablet,12 hr sustained-release take 1 tablet by oral route 2 times every day //   Y   fluticasone 500 mcg-salmeterol 50 mcg/dose blistr powdr for inhalation inhale 1 puff by inhalation route 2 times every day in the morning and evening approximately 12 hours apart //   Y   Creon 12,000-38,000-60,000 unit capsule,delayed release take 2 capsule by oral route 3 times every day with meals and 1 capsule with each snack //   Y   Mucinex 600 mg tablet, extended release take 1 tablet by oral route  every 12 hours as needed //   Y   hydrocodone 5 mg-acetaminophen 325 mg tablet take 1 tablet by oral route  every 6 hours as needed for pain //   Y   methylprednisolone 4 mg tablet take 1 tablet by oral route 4 times every day with food //   Y nystatin-triamcinolone 100,000 unit/g-0.1 % topical cream apply by topical route 2 times every day to the affected area(s) in the morning and evening //   Y   multivitamin tablet take 1 tablet by oral route  every day with food //   Y   ropinirole 4 mg tablet take 1 tablet by oral route 3 times every day //   Y   Tirosint 112 mcg capsule take 1 capsule by oral route  every day //   Y   loperamide 2 mg capsule take 2 capsule by oral route after 1st loose stool, followed by 1 capsule after each subsequent loose stool not to exceed 16 mg/day //   Y   diphenhydramine 25 mg tablet take 2 tablet by oral route  every 4 - 6 hours as needed //   Y   temazepam 30 mg capsule take 1 capsule by oral route  every day at bedtime as needed //   Y     Medication Reconciliation  Medications reconciled today. Allergies  Ingredient Reaction (Severity) Medication Name Comment   NO KNOWN ALLERGIES        Reviewed, no changes. Review of Systems  System Neg/Pos Details   Constitutional Negative Chills and Fever. ENMT Negative Ear infections and Sore throat. Eyes Negative Blurred vision, Double vision and Eye pain. Respiratory Negative Asthma, Chronic cough, Dyspnea and Wheezing. Cardio Negative Chest pain. GI Negative Constipation, Decreased appetite, Diarrhea, Nausea and Vomiting. Endocrine Negative Cold intolerance, Heat intolerance, Increased thirst and Weight loss. Neuro Negative Headache and Tremors. Psych Negative Anxiety and Depression. Integumentary Negative Itching skin and Rash. MS Negative Back pain and Joint pain. Hema/Lymph Negative Easy bleeding.      Vital Signs     Height  Time ft in cm Last Measured Height Position   8:30 AM 4.0 11.00 149.86       Weight/BSA/BMI  Time lb oz kg Context BMI kg/m2 BSA m2   8:30 AM 93.00  42.184  18.78      Blood Pressure  Time BP mm/Hg Position Side Site Method Cuff Size   8:30 /74          Temperature/Pulse/Respiration  Time Temp F Temp C Temp Site Pulse/min Pattern Resp/ min   8:30 AM 97.80 36.56  95       Measured By  Time Measured by   8:30 AM Roderick Prime       Physical Exam  Exam Findings Details   Constitutional * Overall appearance - very thin. Neck Exam Normal Inspection - Normal.   Respiratory Normal Inspection - Normal.   Abdomen Normal No abdominal tenderness. Genitourinary Normal No Suprapubic tenderness. No CVA tenderness. Extremity Normal No Edema. Psychiatric Normal Orientation - Oriented to time, place, person & situation. Appropriate mood and affect. Patient Education  # Patient Education   1.  Kidney Stone: Care Instructions     Medications (added, continued, or stopped today)  Start Date Medication Directions PRN Status PRN Reason Instruction Stop Date    acetaminophen 500 mg tablet take 2 tablet by oral route  every 6 hours as needed N       albuterol sulfate concentrate 2.5 mg/0.5 mL solution for nebulization inhale 0.5 milliliter by nebulization route 3 times every day N       albuterol sulfate HFA 90 mcg/actuation aerosol inhaler inhale 2 puff by inhalation route  every 4 - 6 hours as needed N       bupropion HCl  mg tablet,12 hr sustained-release take 1 tablet by oral route 2 times every day N       cephalexin 500 mg capsule take 1 capsule by ORAL route  every 12 hours N       Creon 12,000-38,000-60,000 unit capsule,delayed release take 2 capsule by oral route 3 times every day with meals and 1 capsule with each snack N       diphenhydramine 25 mg tablet take 2 tablet by oral route  every 4 - 6 hours as needed N       duloxetine 20 mg capsule,delayed release take 1 capsule by oral route 2 times every day N       fluticasone 500 mcg-salmeterol 50 mcg/dose blistr powdr for inhalation inhale 1 puff by inhalation route 2 times every day in the morning and evening approximately 12 hours apart N       hydrocodone 5 mg-acetaminophen 325 mg tablet take 1 tablet by oral route  every 6 hours as needed for pain N loperamide 2 mg capsule take 2 capsule by oral route after 1st loose stool, followed by 1 capsule after each subsequent loose stool not to exceed 16 mg/day N       methylprednisolone 4 mg tablet take 1 tablet by oral route 4 times every day with food N       montelukast 4 mg chewable tablet  N       Mucinex 600 mg tablet, extended release take 1 tablet by oral route  every 12 hours as needed N       multivitamin tablet take 1 tablet by oral route  every day with food N       nystatin-triamcinolone 100,000 unit/g-0.1 % topical cream apply by topical route 2 times every day to the affected area(s) in the morning and evening N       ropinirole 4 mg tablet take 1 tablet by oral route 3 times every day N       temazepam 30 mg capsule take 1 capsule by oral route  every day at bedtime as needed N       Tirosint 112 mcg capsule take 1 capsule by oral route  every day N      Active Patient Care Team Members    Name Contact Agency Type Support Role Relationship Active Date Inactive Date Specialty   Isclyde Torres   Emergency Contact Child, Mother is the Patient          Provider:       Amanda Whitaker MD

## 2019-10-09 ENCOUNTER — APPOINTMENT (OUTPATIENT)
Dept: GENERAL RADIOLOGY | Age: 69
DRG: 856 | End: 2019-10-09
Attending: INTERNAL MEDICINE
Payer: MEDICARE

## 2019-10-09 ENCOUNTER — APPOINTMENT (OUTPATIENT)
Dept: NON INVASIVE DIAGNOSTICS | Age: 69
DRG: 856 | End: 2019-10-09
Attending: INTERNAL MEDICINE
Payer: MEDICARE

## 2019-10-09 ENCOUNTER — HOME CARE VISIT (OUTPATIENT)
Dept: HOME HEALTH SERVICES | Facility: HOME HEALTH | Age: 69
End: 2019-10-09
Payer: MEDICARE

## 2019-10-09 ENCOUNTER — APPOINTMENT (OUTPATIENT)
Dept: ULTRASOUND IMAGING | Age: 69
DRG: 856 | End: 2019-10-09
Attending: INTERNAL MEDICINE
Payer: MEDICARE

## 2019-10-09 PROBLEM — E46 MALNUTRITION (HCC): Status: ACTIVE | Noted: 2019-10-09

## 2019-10-09 PROBLEM — R65.21 SEPTIC SHOCK (HCC): Status: ACTIVE | Noted: 2019-10-09

## 2019-10-09 PROBLEM — A41.9 SEPTIC SHOCK (HCC): Status: ACTIVE | Noted: 2019-10-09

## 2019-10-09 LAB
ALBUMIN SERPL-MCNC: 1.8 G/DL (ref 3.4–5)
ALBUMIN/GLOB SERPL: 0.6 {RATIO} (ref 0.8–1.7)
ALP SERPL-CCNC: 221 U/L (ref 45–117)
ALT SERPL-CCNC: 61 U/L (ref 13–56)
ANION GAP SERPL CALC-SCNC: 8 MMOL/L (ref 3–18)
AST SERPL-CCNC: 222 U/L (ref 10–38)
BASOPHILS # BLD: 0.1 K/UL (ref 0–0.1)
BASOPHILS NFR BLD: 1 % (ref 0–3)
BILIRUB SERPL-MCNC: 1 MG/DL (ref 0.2–1)
BUN SERPL-MCNC: 11 MG/DL (ref 7–18)
BUN/CREAT SERPL: 15 (ref 12–20)
CA-I SERPL-SCNC: 1.08 MMOL/L (ref 1.12–1.32)
CA-I SERPL-SCNC: 1.27 MMOL/L (ref 1.12–1.32)
CALCIUM SERPL-MCNC: 7.5 MG/DL (ref 8.5–10.1)
CHLORIDE SERPL-SCNC: 109 MMOL/L (ref 100–111)
CO2 SERPL-SCNC: 23 MMOL/L (ref 21–32)
CREAT SERPL-MCNC: 0.72 MG/DL (ref 0.6–1.3)
DIFFERENTIAL METHOD BLD: ABNORMAL
EOSINOPHIL # BLD: 0 K/UL (ref 0–0.4)
EOSINOPHIL NFR BLD: 0 % (ref 0–5)
ERYTHROCYTE [DISTWIDTH] IN BLOOD BY AUTOMATED COUNT: 16.8 % (ref 11.6–14.5)
GLOBULIN SER CALC-MCNC: 2.8 G/DL (ref 2–4)
GLUCOSE SERPL-MCNC: 86 MG/DL (ref 74–99)
HCT VFR BLD AUTO: 31.1 % (ref 35–45)
HGB BLD-MCNC: 9.8 G/DL (ref 12–16)
LACTATE SERPL-SCNC: 1.7 MMOL/L (ref 0.4–2)
LACTATE SERPL-SCNC: 2.4 MMOL/L (ref 0.4–2)
LACTATE SERPL-SCNC: 2.6 MMOL/L (ref 0.4–2)
LACTATE SERPL-SCNC: 2.7 MMOL/L (ref 0.4–2)
LACTATE SERPL-SCNC: 2.9 MMOL/L (ref 0.4–2)
LYMPHOCYTES # BLD: 0.6 K/UL (ref 0.8–3.5)
LYMPHOCYTES NFR BLD: 4 % (ref 20–51)
MAGNESIUM SERPL-MCNC: 1.2 MG/DL (ref 1.6–2.6)
MAGNESIUM SERPL-MCNC: 1.3 MG/DL (ref 1.6–2.6)
MCH RBC QN AUTO: 27.3 PG (ref 24–34)
MCHC RBC AUTO-ENTMCNC: 31.5 G/DL (ref 31–37)
MCV RBC AUTO: 86.6 FL (ref 74–97)
MONOCYTES # BLD: 0.1 K/UL (ref 0–1)
MONOCYTES NFR BLD: 1 % (ref 2–9)
NEUTS BAND NFR BLD MANUAL: 45 % (ref 0–5)
NEUTS SEG # BLD: 7.3 K/UL (ref 1.8–8)
NEUTS SEG NFR BLD: 49 % (ref 42–75)
PHOSPHATE SERPL-MCNC: 3.4 MG/DL (ref 2.5–4.9)
PHOSPHATE SERPL-MCNC: 3.7 MG/DL (ref 2.5–4.9)
PLATELET # BLD AUTO: 258 K/UL (ref 135–420)
PMV BLD AUTO: 9 FL (ref 9.2–11.8)
POTASSIUM SERPL-SCNC: 3.8 MMOL/L (ref 3.5–5.5)
POTASSIUM SERPL-SCNC: 3.9 MMOL/L (ref 3.5–5.5)
POTASSIUM SERPL-SCNC: 4.1 MMOL/L (ref 3.5–5.5)
PROT SERPL-MCNC: 4.6 G/DL (ref 6.4–8.2)
RBC # BLD AUTO: 3.59 M/UL (ref 4.2–5.3)
RBC MORPH BLD: ABNORMAL
SODIUM SERPL-SCNC: 140 MMOL/L (ref 136–145)
WBC # BLD AUTO: 14.9 K/UL (ref 4.6–13.2)
WBC MORPH BLD: ABNORMAL

## 2019-10-09 PROCEDURE — 74011250637 HC RX REV CODE- 250/637: Performed by: INTERNAL MEDICINE

## 2019-10-09 PROCEDURE — C9113 INJ PANTOPRAZOLE SODIUM, VIA: HCPCS | Performed by: INTERNAL MEDICINE

## 2019-10-09 PROCEDURE — 77030005513 HC CATH URETH FOL11 MDII -B

## 2019-10-09 PROCEDURE — 84132 ASSAY OF SERUM POTASSIUM: CPT

## 2019-10-09 PROCEDURE — 74011250636 HC RX REV CODE- 250/636: Performed by: UROLOGY

## 2019-10-09 PROCEDURE — 77030019905 HC CATH URETH INTMIT MDII -A

## 2019-10-09 PROCEDURE — 76770 US EXAM ABDO BACK WALL COMP: CPT

## 2019-10-09 PROCEDURE — 65610000006 HC RM INTENSIVE CARE

## 2019-10-09 PROCEDURE — 76450000000

## 2019-10-09 PROCEDURE — 85025 COMPLETE CBC W/AUTO DIFF WBC: CPT

## 2019-10-09 PROCEDURE — 3331090002 HH PPS REVENUE DEBIT

## 2019-10-09 PROCEDURE — 84100 ASSAY OF PHOSPHORUS: CPT

## 2019-10-09 PROCEDURE — 74011000258 HC RX REV CODE- 258: Performed by: UROLOGY

## 2019-10-09 PROCEDURE — 83735 ASSAY OF MAGNESIUM: CPT

## 2019-10-09 PROCEDURE — 74011250637 HC RX REV CODE- 250/637: Performed by: UROLOGY

## 2019-10-09 PROCEDURE — 74011250636 HC RX REV CODE- 250/636: Performed by: INTERNAL MEDICINE

## 2019-10-09 PROCEDURE — 82330 ASSAY OF CALCIUM: CPT

## 2019-10-09 PROCEDURE — P9047 ALBUMIN (HUMAN), 25%, 50ML: HCPCS | Performed by: INTERNAL MEDICINE

## 2019-10-09 PROCEDURE — 94640 AIRWAY INHALATION TREATMENT: CPT

## 2019-10-09 PROCEDURE — 74011000250 HC RX REV CODE- 250: Performed by: UROLOGY

## 2019-10-09 PROCEDURE — 74011250636 HC RX REV CODE- 250/636: Performed by: HOSPITALIST

## 2019-10-09 PROCEDURE — 74011000250 HC RX REV CODE- 250: Performed by: INTERNAL MEDICINE

## 2019-10-09 PROCEDURE — 71045 X-RAY EXAM CHEST 1 VIEW: CPT

## 2019-10-09 PROCEDURE — 77010033678 HC OXYGEN DAILY

## 2019-10-09 PROCEDURE — 74011250637 HC RX REV CODE- 250/637: Performed by: HOSPITALIST

## 2019-10-09 PROCEDURE — 83605 ASSAY OF LACTIC ACID: CPT

## 2019-10-09 PROCEDURE — 74011000250 HC RX REV CODE- 250

## 2019-10-09 PROCEDURE — 3331090001 HH PPS REVENUE CREDIT

## 2019-10-09 PROCEDURE — 36415 COLL VENOUS BLD VENIPUNCTURE: CPT

## 2019-10-09 RX ORDER — NOREPINEPHRINE BIT/0.9 % NACL 8 MG/250ML
INFUSION BOTTLE (ML) INTRAVENOUS
Status: COMPLETED
Start: 2019-10-09 | End: 2019-10-09

## 2019-10-09 RX ORDER — MAGNESIUM SULFATE HEPTAHYDRATE 40 MG/ML
2 INJECTION, SOLUTION INTRAVENOUS ONCE
Status: COMPLETED | OUTPATIENT
Start: 2019-10-09 | End: 2019-10-10

## 2019-10-09 RX ORDER — HEPARIN SODIUM 5000 [USP'U]/ML
5000 INJECTION, SOLUTION INTRAVENOUS; SUBCUTANEOUS EVERY 8 HOURS
Status: DISCONTINUED | OUTPATIENT
Start: 2019-10-09 | End: 2019-10-15 | Stop reason: HOSPADM

## 2019-10-09 RX ORDER — ALBUMIN HUMAN 250 G/1000ML
12.5 SOLUTION INTRAVENOUS EVERY 6 HOURS
Status: COMPLETED | OUTPATIENT
Start: 2019-10-09 | End: 2019-10-11

## 2019-10-09 RX ORDER — IPRATROPIUM BROMIDE AND ALBUTEROL SULFATE 2.5; .5 MG/3ML; MG/3ML
3 SOLUTION RESPIRATORY (INHALATION)
Status: DISCONTINUED | OUTPATIENT
Start: 2019-10-09 | End: 2019-10-09

## 2019-10-09 RX ORDER — POTASSIUM CHLORIDE 20 MEQ/1
20 TABLET, EXTENDED RELEASE ORAL
Status: COMPLETED | OUTPATIENT
Start: 2019-10-09 | End: 2019-10-09

## 2019-10-09 RX ORDER — POTASSIUM CHLORIDE 750 MG/1
10 TABLET, EXTENDED RELEASE ORAL
Status: COMPLETED | OUTPATIENT
Start: 2019-10-09 | End: 2019-10-09

## 2019-10-09 RX ORDER — MAGNESIUM SULFATE 1 G/100ML
1 INJECTION INTRAVENOUS ONCE
Status: COMPLETED | OUTPATIENT
Start: 2019-10-09 | End: 2019-10-10

## 2019-10-09 RX ORDER — NOREPINEPHRINE BIT/0.9 % NACL 8 MG/250ML
.5-16 INFUSION BOTTLE (ML) INTRAVENOUS
Status: DISCONTINUED | OUTPATIENT
Start: 2019-10-09 | End: 2019-10-10

## 2019-10-09 RX ORDER — CALCIUM GLUCONATE 20 MG/ML
2 INJECTION, SOLUTION INTRAVENOUS ONCE
Status: COMPLETED | OUTPATIENT
Start: 2019-10-09 | End: 2019-10-09

## 2019-10-09 RX ORDER — IPRATROPIUM BROMIDE AND ALBUTEROL SULFATE 2.5; .5 MG/3ML; MG/3ML
3 SOLUTION RESPIRATORY (INHALATION)
Status: DISCONTINUED | OUTPATIENT
Start: 2019-10-09 | End: 2019-10-15 | Stop reason: HOSPADM

## 2019-10-09 RX ORDER — LEVOFLOXACIN 5 MG/ML
500 INJECTION, SOLUTION INTRAVENOUS EVERY 24 HOURS
Status: DISCONTINUED | OUTPATIENT
Start: 2019-10-09 | End: 2019-10-10

## 2019-10-09 RX ADMIN — ALBUMIN (HUMAN) 12.5 G: 0.25 INJECTION, SOLUTION INTRAVENOUS at 16:06

## 2019-10-09 RX ADMIN — PIPERACILLIN AND TAZOBACTAM 3.38 G: 3; .375 INJECTION, POWDER, LYOPHILIZED, FOR SOLUTION INTRAVENOUS at 02:09

## 2019-10-09 RX ADMIN — IPRATROPIUM BROMIDE AND ALBUTEROL SULFATE 3 ML: .5; 3 SOLUTION RESPIRATORY (INHALATION) at 11:31

## 2019-10-09 RX ADMIN — DULOXETINE 60 MG: 60 CAPSULE, DELAYED RELEASE ORAL at 09:10

## 2019-10-09 RX ADMIN — MAGNESIUM SULFATE HEPTAHYDRATE 1 G: 1 INJECTION, SOLUTION INTRAVENOUS at 18:32

## 2019-10-09 RX ADMIN — SODIUM CHLORIDE 125 ML/HR: 900 INJECTION, SOLUTION INTRAVENOUS at 20:36

## 2019-10-09 RX ADMIN — ONDANSETRON 4 MG: 2 INJECTION INTRAMUSCULAR; INTRAVENOUS at 08:57

## 2019-10-09 RX ADMIN — NYSTATIN AND TRIAMCINOLONE ACETONIDE: 100000; 1 CREAM TOPICAL at 22:16

## 2019-10-09 RX ADMIN — POTASSIUM CHLORIDE 10 MEQ: 10 TABLET, EXTENDED RELEASE ORAL at 16:11

## 2019-10-09 RX ADMIN — ALBUMIN (HUMAN) 12.5 G: 0.25 INJECTION, SOLUTION INTRAVENOUS at 10:43

## 2019-10-09 RX ADMIN — POTASSIUM CHLORIDE 20 MEQ: 20 TABLET, EXTENDED RELEASE ORAL at 10:18

## 2019-10-09 RX ADMIN — NOREPINEPHRINE BITARTRATE 0.5 MCG/MIN: 1 INJECTION, SOLUTION, CONCENTRATE INTRAVENOUS at 04:21

## 2019-10-09 RX ADMIN — NYSTATIN AND TRIAMCINOLONE ACETONIDE: 100000; 1 CREAM TOPICAL at 16:12

## 2019-10-09 RX ADMIN — SODIUM CHLORIDE 500 ML: 900 INJECTION, SOLUTION INTRAVENOUS at 02:05

## 2019-10-09 RX ADMIN — BUPROPION HYDROCHLORIDE 300 MG: 150 TABLET, EXTENDED RELEASE ORAL at 09:10

## 2019-10-09 RX ADMIN — ALBUMIN (HUMAN) 12.5 G: 0.25 INJECTION, SOLUTION INTRAVENOUS at 23:29

## 2019-10-09 RX ADMIN — TEMAZEPAM 30 MG: 15 CAPSULE ORAL at 22:13

## 2019-10-09 RX ADMIN — NYSTATIN AND TRIAMCINOLONE ACETONIDE: 100000; 1 CREAM TOPICAL at 09:04

## 2019-10-09 RX ADMIN — PANCRELIPASE LIPASE, PANCRELIPASE PROTEASE, PANCRELIPASE AMYLASE 1 CAPSULE: 10000; 32000; 42000 CAPSULE, DELAYED RELEASE ORAL at 12:42

## 2019-10-09 RX ADMIN — Medication 10 ML: at 22:17

## 2019-10-09 RX ADMIN — PANTOPRAZOLE SODIUM 40 MG: 40 INJECTION, POWDER, FOR SOLUTION INTRAVENOUS at 12:41

## 2019-10-09 RX ADMIN — PANCRELIPASE LIPASE, PANCRELIPASE PROTEASE, PANCRELIPASE AMYLASE 1 CAPSULE: 10000; 32000; 42000 CAPSULE, DELAYED RELEASE ORAL at 17:38

## 2019-10-09 RX ADMIN — PIPERACILLIN AND TAZOBACTAM 3.38 G: 3; .375 INJECTION, POWDER, LYOPHILIZED, FOR SOLUTION INTRAVENOUS at 09:01

## 2019-10-09 RX ADMIN — MAGNESIUM SULFATE HEPTAHYDRATE 2 G: 40 INJECTION, SOLUTION INTRAVENOUS at 17:37

## 2019-10-09 RX ADMIN — OXYCODONE HYDROCHLORIDE AND ACETAMINOPHEN 2 TABLET: 5; 325 TABLET ORAL at 16:42

## 2019-10-09 RX ADMIN — CEFTRIAXONE 1 G: 1 INJECTION, POWDER, FOR SOLUTION INTRAMUSCULAR; INTRAVENOUS at 17:38

## 2019-10-09 RX ADMIN — Medication 0.5 MCG/MIN: at 03:16

## 2019-10-09 RX ADMIN — SODIUM CHLORIDE 500 ML: 900 INJECTION, SOLUTION INTRAVENOUS at 16:05

## 2019-10-09 RX ADMIN — LEVOFLOXACIN 500 MG: 5 INJECTION, SOLUTION INTRAVENOUS at 13:54

## 2019-10-09 RX ADMIN — SODIUM CHLORIDE 500 ML: 900 INJECTION, SOLUTION INTRAVENOUS at 05:01

## 2019-10-09 RX ADMIN — MONTELUKAST 10 MG: 10 TABLET, FILM COATED ORAL at 22:15

## 2019-10-09 RX ADMIN — OXYCODONE HYDROCHLORIDE AND ACETAMINOPHEN 1 TABLET: 5; 325 TABLET ORAL at 23:32

## 2019-10-09 RX ADMIN — LOPERAMIDE HYDROCHLORIDE 2 MG: 2 CAPSULE ORAL at 01:06

## 2019-10-09 RX ADMIN — CALCIUM GLUCONATE 2 G: 20 INJECTION, SOLUTION INTRAVENOUS at 16:09

## 2019-10-09 RX ADMIN — SODIUM CHLORIDE 250 ML: 900 INJECTION, SOLUTION INTRAVENOUS at 10:42

## 2019-10-09 RX ADMIN — LEVOTHYROXINE SODIUM 87.5 MCG: 25 TABLET ORAL at 09:09

## 2019-10-09 RX ADMIN — SODIUM CHLORIDE 125 ML/HR: 900 INJECTION, SOLUTION INTRAVENOUS at 03:23

## 2019-10-09 RX ADMIN — SODIUM CHLORIDE 125 ML/HR: 900 INJECTION, SOLUTION INTRAVENOUS at 16:54

## 2019-10-09 NOTE — PROGRESS NOTES
INITIAL NUTRITION ASSESSMENT     RECOMMENDATIONS/PLAN:   -Diet: will add small meals to diet order  -Monitor labs/lytes, BM, PO intake, skin integrity, wt, fluid status    REASON FOR ASSESSMENT:     [] Positive Nutrition Screen:  [x] BMI <19  [] NPO/Clear Liquid Diet > 5 days (>3 days in ICU)  [] New Order for TPN    Adult Malnutrition Criteria:     Nutrition assessment was completed by RDN and the patient was found to meet the following malnutrition criteria established by ASPEN/AND:    Adult Malnutrition Guidelines:  SEVERE PROTEIN CALORIE MALNUTRITION IN THE CONTEXT OF CHRONIC ILLNESS  Weight Loss:  >5% x 1 month or >7.5% x 3 months or >10% x 6 months or >20% x 12 months  Body Fat:  Severe Depletion  Muscle Mass: Severe Depletion    Temporal and orbital loss, bicep and tricep loss, clavicle protrusion    Please document MALNUTRITION in Problem List if in agreement. Angelica Bae  10/09/19          NUTRITION ASSESSMENT:   Client History: 71 yrs old Female admitted with renal stone operation. Rapid response 10/8, enterocutaneous fistula, pyelonephritis, ureterolithiasis, pyelitis, sepsis  PMHx: asthma, depression, emphysema of lung, gastroparesis, hypothyroidism, malabsorption of fat, pancreatic insufficiency, restless leg   Cultural/Religion Food Preferences: None Identified     FOOD/NUTRITION HISTORY  Diet History: pt appetite is fair and had Billroth II in June of last year and has malabsorption. She eats small meals about 6x a day  Food Allergies:  [x] NKFA     [] Yes   Pertinent PTA Medications: none     NUTRITION INTAKE   Diet Order:  Regular     Average PO Intake:       No data found.    Pertinent Medications:  [x] Reviewed; zofran, imodium, synthroid, protonix  Electrolyte Replacement Protocol: []K  []Mg  []PO4    Insulin:  [] SSI  [] Pre-meal   []  Basal   [] Drip  [x] None  Pt expected to meet estimated nutrient needs through next review:          [x]  Yes     [] No; ANTHROPOMETRICS  Height: 4' 11\" (149.9 cm)       Weight: 41.3 kg (91 lb)    BMI: 18.4 kg/m^2  -  underweight (Less than or = to 18.5% BMI)        Weight change: pt has lost 6lb in 3 mo which is not significant                                  Comparison to Reference Standards:  IBW: 98 lbs      %IBW: 93%      AdjBW: n/a    NUTRITION-FOCUSED PHYSICAL ASSESSMENT  Skin: No PU      GI: +BM 10/8    BIOCHEMICAL DATA & MEDICAL TESTS  Pertinent Labs:  [x] Reviewed; calcium-7.5    NUTRITION PRESCRIPTION  Calories: 1218-2633 kcal/day based on 40-45 kcal/kg  Protein: 62-83 g/day based on 1.5-2 g/kg  Fluid: 2498-1549 ml/day based on 1 kcal/ml      NUTRITION DIAGNOSES:   1. Underweight related to Billroth II as evidenced by a BMI of 18.4. NUTRITION INTERVENTIONS:   INTERVENTIONS:        GOALS:  1. Diet: will add small meals to diet order 1. Meet estimated needs by next review 3 days     LEARNING NEEDS (Diet, Supplementation, Food/Nutrient-Drug Interaction):   [] None Identified  [] Inpatient education provided/documented    [] Identified and patient:  [] Declined     [x] Was not appropriate/indicated  NUTRITION MONITORING /EVALUATION:   Follow PO intake  Monitor wt  Monitor renal labs, electrolytes, fluid status  Monitor for additional supplement needs    [x] Participated in Interdisciplinary Rounds  [x] Interdisciplinary Care Plan Reviewed/Documented  DISCHARGE NUTRITION RECOMMENDATIONS ADDRESSED:          [x] To be determined closer to discharge    NUTRITION RISK:     [x]  At risk                     []  Not currently at risk     Will follow-up per policy.   Laura Banks 1

## 2019-10-09 NOTE — PROGRESS NOTES
Urology Progress Note    Subjective:     Daily Progress Note: 10/9/2019 7:30 AM    Jolene Pacheco feels weak and tired. No SOB. No flank pain. Mild right sided abd pain. She has had sepsis episodes before and so far this pales in terms of she doesn't feel quite as weak. Objective:     Visit Vitals  BP 98/59   Pulse 92   Temp 99.9 °F (37.7 °C)   Resp 26   Ht 4' 11\" (1.499 m)   Wt 41.3 kg (91 lb)   SpO2 99%   Breastfeeding? No   BMI 18.38 kg/m²        Temp (24hrs), Av.6 °F (37.6 °C), Min:97.8 °F (36.6 °C), Max:100.9 °F (38.3 °C)      Intake and Output:  10/07 1901 - 10/09 0700  In: 3500 [P.O.:600; I.V.:2900]  Out: 450 [Urine:450]  No intake/output data recorded.     Physical Exam:   Gen - NAD, lying comfortably in bed and able to carry on a conversation  CV - RRR  Resp - Breathing easy  Abd - soft, nd, nt,  Fistula draining with mild erythema  Ext - No edema      Lab/Data Review:  CMP:   Lab Results   Component Value Date/Time     10/09/2019 02:04 AM    K 3.8 10/09/2019 02:04 AM     10/09/2019 02:04 AM    CO2 23 10/09/2019 02:04 AM    AGAP 8 10/09/2019 02:04 AM    GLU 86 10/09/2019 02:04 AM    BUN 11 10/09/2019 02:04 AM    CREA 0.72 10/09/2019 02:04 AM    GFRAA >60 10/09/2019 02:04 AM    GFRNA >60 10/09/2019 02:04 AM    CA 7.5 (L) 10/09/2019 02:04 AM    ALB 1.8 (L) 10/09/2019 02:04 AM    TP 4.6 (L) 10/09/2019 02:04 AM    GLOB 2.8 10/09/2019 02:04 AM    AGRAT 0.6 (L) 10/09/2019 02:04 AM    SGOT 222 (H) 10/09/2019 02:04 AM    ALT 61 (H) 10/09/2019 02:04 AM           CBC WITH AUTOMATED DIFF    Collection Time: 10/09/19  2:04 AM   Result Value Ref Range    WBC 14.9 (H) 4.6 - 13.2 K/uL    RBC 3.59 (L) 4.20 - 5.30 M/uL    HGB 9.8 (L) 12.0 - 16.0 g/dL    HCT 31.1 (L) 35.0 - 45.0 %    MCV 86.6 74.0 - 97.0 FL    MCH 27.3 24.0 - 34.0 PG    MCHC 31.5 31.0 - 37.0 g/dL    RDW 16.8 (H) 11.6 - 14.5 %    PLATELET 107 386 - 370 K/uL    MPV 9.0 (L) 9.2 - 11.8 FL    NEUTROPHILS 49 42 - 75 %    BAND NEUTROPHILS 45 (H) 0 - 5 %    LYMPHOCYTES 4 (L) 20 - 51 %    MONOCYTES 1 (L) 2 - 9 %    EOSINOPHILS 0 0 - 5 %    BASOPHILS 1 0 - 3 %    ABS. NEUTROPHILS 7.3 1.8 - 8.0 K/UL    ABS. LYMPHOCYTES 0.6 (L) 0.8 - 3.5 K/UL    ABS. MONOCYTES 0.1 0 - 1.0 K/UL    ABS. EOSINOPHILS 0.0 0.0 - 0.4 K/UL    ABS. BASOPHILS 0.1 0.0 - 0.1 K/UL    RBC COMMENTS ANISOCYTOSIS  1+        WBC COMMENTS TOXIC GRANULATION      DF MANUAL           Assessment/Plan:     Principal Problem:    Renal stone (10/7/2019)    Active Problems:    Pyelonephritis (8/31/2019)      Sepsis ([address] (10/8/2019)        Plan:  She had a negative pre-op culture and was given culture directed abx perioperatively and still with sepsis. Lactate is improving. HR is improved. BP is low on pressors. We will continue supportive care:   Wean pressors  Art insertion

## 2019-10-09 NOTE — PROGRESS NOTES
Reason for Readmission:   Elective cystoscopy           RRAT Score and Risk Level:    16      Level of Readmission:  2        Care Conference scheduled:  Palliative consulted       Resources/supports as identified by patient/family:   Home health services       Top Challenges facing patient (as identified by patient/family and CM): Finances/Medication cost?   Medicare and medicaid    Transportation    daughter    Support system or lack thereof?  daughter     Living arrangements? Lives with roommate        Self-care/ADLs/Cognition?   independent         Current Advanced Directive/Advance Care Plan: On chart         Plan for utilizing home health:  yes              Transition of Care Plan:    Based on readmission, the patient's previous Plan of Care   has been evaluated and/or modified. The current Transition of Care Plan is: To return back home when discharged, pt would like to cont services with [address] had elective cystoscopy , developed tachycardia,sepsis RRT was called yesterday  Transferred to ICU for close monitoring,pt has home walker if needed, daughter will provide transportation back home, cm will cont to review and remain available for d/c planning. Care Management Interventions  PCP Verified by CM: Yes  Palliative Care Criteria Met (RRAT>21 & CHF Dx)?: No  Transition of Care Consult (CM Consult): [address]: Yes  Current Support Network:  Other(has roommates )  Confirm Follow Up Transport: (daughter)

## 2019-10-09 NOTE — OP NOTES
[facility]  OPERATIVE REPORT    Name:  Andrés Luong  MR#:   [id number]  :  1950  ACCOUNT #:  [de-identified]  DATE OF SERVICE:  10/08/2019    PREOPERATIVE DIAGNOSIS:  Renal stones. POSTOPERATIVE DIAGNOSIS:  Renal stones. PROCEDURE PERFORMED:  Cystoscopy, right retrograde pyelogram with interpretation, right ureteroscopy with holmium laser lithotripsy and stent change. SURGEON:  Martha Dias MD.    ASSISTANT:  None. ANESTHESIA:  General.    COMPLICATIONS:  None. SPECIMENS REMOVED:  Right renal stone. IMPLANTS:  A 5-Kosovan, 20-cm stent. ESTIMATED BLOOD LOSS:  Minimal.    FINDINGS:  There was a stone in the renal pelvis that was moderately hard. It did fragment well. INDICATIONS:  The patient is a 71-year-old female with a history of stone and had obstructive pyelonephritis two weeks ago and had a stent placed urgently. She had a negative urine culture last week and was feeling back to normal, and she returns for ureteroscopy. PROCEDURE:  Preoperatively, the risks and benefits of surgery were described to the patient. The risks included, but are not limited to bleeding, infection, injury to bladder, injury to ureter, injury to kidney, and possible need for future procedures. The patient understood the risks and signed the informed consent. The patient was taken to the operating room and placed on the OR table in supine position. She was administered general anesthetic. She was administered intravenous antibiotics. She was placed in dorsal lithotomy position, prepped and draped in the usual sterile manner. A 21-Kosovan cystoscope and sheath were then inserted transurethrally atraumatically under direct vision using a 30-degree lens. Panendoscopy was done. Bilateral ureteral orifices were in normal anatomic position. There was a stent emanating from the right side. There were no stones, no tumors, and no areas of concern within the bladder.   The right ureteral stent was grasped with alligator forceps and removed. The cystoscope was reinserted. I cannulated the right ureteral orifice with a 5-Scottish open-ended ureteral catheter, and retrograde pyelogram was done in the usual manner using 12 mL of Visipaque dye. There were no filling defects along the entire length of the ureter. Up in the kidney, there was a filling defect consistent with a stone in the renal pelvis. This was quite large in nature. No hydronephrosis was seen. Next, I passed a sensor wire through the open-ended catheter up to the kidney. The open-ended catheter was removed. The scope was removed. I then passed a dual-lumen catheter. I passed a second sensor wire up to the kidney. The dual-lumen catheter was removed. One wire was a safety wire, the other wire was a working wire. Over the working wire, I passed an 11/13 Navigator sheath without difficulty over the wire up to the proximal ureter. The inner workings of the sheath were removed. I then passed a flexible ureteroscope through the sheath up to the kidney. I encountered the stone immediately. Using a 272-micron holmium laser fiber, I broke up the stone into many small fragments and extracted the largest ones out. There was still a lot of dust and debris within the kidney, but nothing of much significance at this point. At this point, I looked in every calyx and around the kidney and did not see any significant fragments anywhere. The scope and the sheath were slowly backed down the ureter. The ureter was intact and unharmed. At this point, I reinserted the cystoscope. Over the safety wire, I passed a 5-Scottish, 20-cm stent. The wire was removed. Fluoroscopy confirmed the proximal coil was in the renal pelvis and the distal coil was noted to be in the bladder by direct vision. At this point, the patient was taken out of lithotomy position, revived from anesthesia, taken to recovery in stable condition.       BARRY MD TIFFANY [email or web address]  D:  10/09/2019 7:49  T:  10/09/2019 10:47  JOB #:  [id number]

## 2019-10-09 NOTE — PROGRESS NOTES
Patient awake and talking during episodes of hypotensin   Feels somewhat dizzy  Peripheral levophed started after giving 5 liters of fluids  With persistent low BP  Will need central line

## 2019-10-09 NOTE — PROGRESS NOTES
Tele monitor not recording and able to be transferred into Epic. Will call [employer] again. Patient keeps getting discharged from system.

## 2019-10-09 NOTE — CDMP QUERY
Patient admitted with pyelonephritis, noted to have hypotension. If possible, please document in progress notes and d/c summary if you are evaluating and/or treating any of the following: 
  
 
? Other Explanation of clinical findings ? Clinically Undetermined (no explanation for clinical findings) The medical record reflects the following: 
 
   Risk Factors:  
   Clinical Indicators:  
   Treatment:

## 2019-10-09 NOTE — PROGRESS NOTES
Palliative Medicine  [place]: [phone] ([phone]  Formerly [facility]: [phone])      Palliative consult to assist with goals of care and paperwork. Palliative team, YODIT Byrd and this MSW, met with patient and daughter German Islas at bedside. Ms. Fozia Martinez was AAOx4 sitting up in bed. Introduced palliative care for support and goals of care. Patient stated she has paperwork from [place] stating DNR and end of life wishes. She would like to complete [facility] paperwork to note wishes. Completed VA forms (POST and AMD) to document wishes and healthcare agents. POST form states DNR/DNI, limited interventions and no feeding tubes. Advanced directive names daughter Lance Christian primary healthcare agent then brother Vasiliy Ryan secondary agent. Both forms were placed on chart for scanning and copies provided to family. Ms. Fozia Martinez mentioned needing to follow up with her pulmonologist for PET scan of lung nodule. She stated if nodule is cancerous she wouldn't want treatment. We briefly discussed hospice criteria and services for patients future knowledge. Will provided patient with list of questions to ask IF nodule is cancerous, to assist her in determining care decisions. Ms. Fozia Martinez moved here with daughter and son-in law March 2019. Son-in law is [employer] active duty and stationed at [employer]. She's originally from [facility], but has moved around to be with daughter. Plan: continue current treatment  DNR/DNI  PM team will continue providing support    Time in: 1045  Time out: 1123    Thank you for the opportunity to assist in the care of Ms. Barker.    LUCI Rojo, APHSW-C  Palliative Medicine

## 2019-10-09 NOTE — ACP (ADVANCE CARE PLANNING)
Patient has forms on file from [place] for DNR and Living Will. Completed VA forms (POST and AMD) to document wishes and healthcare agents. POST form states DNR/DNI, limited interventions and no feeding tubes. Advanced directive names daughter Chelsea Zavala primary healthcare agent then brother Heather Posada secondary agent. Both forms were placed on chart for scanning and copies provided to family.     Code status: DNR/DNI    MPOA: Chelsea Zavala (daughter)  [phone]    2nd MPOA: Heather Posada (brother)  [phone]

## 2019-10-09 NOTE — WOUND CARE
Discussed with Constantine Calhoun RN recommendation of continuation of dry dressing changes to draining fistula with skin protectant around opening with Proshield or [place].

## 2019-10-09 NOTE — CONSULTS
Medicine Consult    Patient:  Reshma Fox 71 y.o. female  Asked to evaluate patient by Dr. 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 Post op she is noted to have low BP and tachycardia. Sepsis - started on IVF, she received levaquin, will add ceftriaxone. Follow lactic acid. If she is not responding to fluids then will need pressors. Follow blood and urine cultures    UTI/pyelonephritis  Urine cultures 09/25/2019 showed growth of klebsiella pneumoniae. Out patient cultures done at Dr. Gricelda Bruno office showed no growth. Antibiotics - levaquin, ceftriaxone    Enterocutaneous fistula    Severe protein calorie malnutrition. Thank you for allowing us to participate in this patients care. HPI:   CC: Reshma Fox is a 71 y.o. female with past medical history significant for enterocutaneous fistula, emphysema, gastroparesis, malabsorption due to pancreatic insufficiency, multiple abdominal surgeries, urolithiasis is s/p cystoscopy, right retrograde pyelogram, right ureteroscopy, laser lithotripsy and stent change. Pos op she is noted to have low BP and tachycardia. She had UTI/pyelonephritis about 2 weeks ago and was admitted to the hospital. Her urine cultures showed growth of klebsiella pneumoniae. Urine cultures done at Dr. Gricelda Bruno office showed no growth. She denies any dizziness, chest pain, SOB. Patient reports her BP is usually low.      Past Medical History:   Diagnosis Date    Asthma     Depression     Emphysema of lung (HCC)     Gastroparesis  Hypothyroidism     Malabsorption     of fat    Pancreatic insufficiency     Restless leg     bilateral     Past Surgical History:   Procedure Laterality Date    ABDOMEN SURGERY PROC UNLISTED      HX HERNIA REPAIR      HX SMALL BOWEL RESECTION      intra aortic baloon pump    HX TUBAL LIGATION        Social History     Tobacco Use    Smoking status: Former Smoker    Smokeless tobacco: Never Used   Substance Use Topics    Alcohol use: Never     Frequency: Never    Drug use: Not Currently     History reviewed. No pertinent family history. No current facility-administered medications on file prior to encounter. Current Outpatient Medications on File Prior to Encounter   Medication Sig Dispense Refill    umeclidinium (INCRUSE ELLIPTA) 62.5 mcg/actuation inhaler Take 1 Puff by inhalation daily.  cyanocobalamin 1,000 mcg tablet Take 1,000 mcg by mouth daily.  montelukast (SINGULAIR) 10 mg tablet Take 4 mg by mouth daily.  multivitamin with iron tablet Take 1 Tab by mouth daily.  lipase-protease-amylase (CREON) 12,000-38,000 -60,000 unit capsule Take 1 Cap by mouth three (3) times daily (with meals). Indications: with snack      fluticasone propion-salmeterol (ADVAIR DISKUS) 500-50 mcg/dose diskus inhaler Take 1 Puff by inhalation every twelve (12) hours.  TEMAZEPAM PO Take 30 mg by mouth as needed.  levothyroxine (SYNTHROID) 112 mcg tablet Take 88 mcg by mouth Daily (before breakfast).  lipase-protease-amylase (CREON) 12,000-38,000 -60,000 unit capsule Take 2 Caps by mouth three (3) times daily (with meals).  loperamide (IMODIUM) 2 mg capsule Take 2 mg by mouth four (4) times daily as needed for Diarrhea.  buPROPion XL (WELLBUTRIN XL) 150 mg tablet Take 300 mg by mouth every morning.  DULoxetine (CYMBALTA) 20 mg capsule Take 60 mg by mouth daily.       albuterol (PROVENTIL HFA, VENTOLIN HFA, PROAIR HFA) 90 mcg/actuation inhaler Take 1 Puff by inhalation every four (4) hours as needed for Wheezing. 1 Inhaler 1    hydrOXYzine HCl (ATARAX) 25 mg tablet Take 25 mg by mouth two (2) times daily as needed for Anxiety or Itching.  naloxone (NARCAN) 4 mg/actuation nasal spray 1 [place] by IntraNASal route once as needed for Other (opiod overdose).  acetaminophen (TYLENOL EXTRA STRENGTH) 500 mg tablet Take 500 mg by mouth every four (4) hours as needed for Pain.  guaiFENesin ER (MUCINEX) 600 mg ER tablet Take 1,200 mg by mouth as needed for Congestion.  diphenhydrAMINE (BENADRYL ALLERGY) 25 mg tablet Take 25 mg by mouth as needed for Sleep.  ropinirole HCl (REQUIP PO) Take 1 mg by mouth as needed for Other (restless leg syndrome).  nystatin-triamcinolone (MYCOLOG II) topical cream Apply  to affected area three (3) times daily. 30 g 2    albuterol-ipratropium (DUO-NEB) 2.5 mg-0.5 mg/3 ml nebu 3 mL by Nebulization route. No Known Allergies        Review of Systems  Constitutional: No fever, chills, diaphoresis, malaise, fatigue or weight gain/loss or falls  Skin: no itching or rashes  HEENT: no ear discomfort, hearing loss, tinnitus, epistaxis or sore throat  EYES: no blurry vision, double vision or photophobia  CARDIOVASCULAR: no claudication, cp, palpitations, orthopnea, pnd or LE edema  PULMONARY: no cough, wheeze, shortness of breath or sputum production  GI: no nausea, vomiting, diarrhea, abdominal pain, melena, hematemesis or brbpr. +enterocutaneous fistula. : see above  MUSCULOSKELETAL: no back pain, joint pain or myalgias  ENDOCRINE: no heat/cold intolerance, polyuria or polydipsia  HEME: no easy bruising or bleeding  NEURO: no unilateral weakness, numbness, tingling or seizures      Physical Exam:      Visit Vitals  /65   Pulse 93   Temp 98.4 °F (36.9 °C)   Resp 22   Ht 4' 11\" (1.499 m)   Wt 41.3 kg (91 lb)   SpO2 98%   Breastfeeding? No   BMI 18.38 kg/m²     Body mass index is 18.38 kg/m².     Physical Exam:  GEN: well nourished, laying in bed in no acute distress  HEENT: atraumatic, nose normal,oropharynx clear, MMM  NECK: supple, trachea midline, no supraclavicular or submandibular adenopathy noted  EYES: conjuctiva normal, lids with out lesions, PERRL  HEART: RRR with out m/r/g, pmi nondisplaced, pulses 2+ distally  LUNGS: equal chest wall expansion, cta bl with out wheezes/rales or rhonchi  AB: soft, +BS, nt/nd no organomegaly. +enterocutaneous fistula  NEURO: alert, awake and oriented x3, gait not assessed, cranial nerves intact, strength 5/5 bl UE and LE, sensation intact, reflexes nonpathological  SKIN: dry, intact, warm no breakdown noted        Laboratory Studies:    Recent Results (from the past 24 hour(s))   CBC WITH AUTOMATED DIFF    Collection Time: 10/08/19  5:34 PM   Result Value Ref Range    WBC 1.1 (L) 4.6 - 13.2 K/uL    RBC 4.00 (L) 4.20 - 5.30 M/uL    HGB 10.9 (L) 12.0 - 16.0 g/dL    HCT 34.7 (L) 35.0 - 45.0 %    MCV 86.8 74.0 - 97.0 FL    MCH 27.3 24.0 - 34.0 PG    MCHC 31.4 31.0 - 37.0 g/dL    RDW 17.0 (H) 11.6 - 14.5 %    PLATELET 315 735 - 066 K/uL    MPV 8.7 (L) 9.2 - 11.8 FL    NEUTROPHILS 40 (L) 42 - 75 %    BAND NEUTROPHILS 18 (H) 0 - 5 %    LYMPHOCYTES 34 20 - 51 %    MONOCYTES 2 2 - 9 %    EOSINOPHILS 2 0 - 5 %    BASOPHILS 4 (H) 0 - 3 %    ABS. NEUTROPHILS 0.4 (L) 1.8 - 8.0 K/UL    ABS. LYMPHOCYTES 0.4 (L) 0.8 - 3.5 K/UL    ABS. MONOCYTES 0.0 0 - 1.0 K/UL    ABS. EOSINOPHILS 0.0 0.0 - 0.4 K/UL    ABS.  BASOPHILS 0.0 0.0 - 0.1 K/UL    RBC COMMENTS ANISOCYTOSIS  1+        DF MANUAL     LACTIC ACID    Collection Time: 10/08/19  5:34 PM   Result Value Ref Range    Lactic acid 2.1 (HH) 0.4 - 2.0 MMOL/L   CULTURE, BLOOD    Collection Time: 10/08/19  5:34 PM   Result Value Ref Range    Special Requests: NO SPECIAL REQUESTS      Culture result: NO GROWTH AFTER 12 HOURS     LACTIC ACID    Collection Time: 10/08/19  9:30 PM   Result Value Ref Range    Lactic acid 4.8 (HH) 0.4 - 2.0 MMOL/L METABOLIC PANEL, COMPREHENSIVE    Collection Time: 10/09/19  2:04 AM   Result Value Ref Range    Sodium 140 136 - 145 mmol/L    Potassium 3.8 3.5 - 5.5 mmol/L    Chloride 109 100 - 111 mmol/L    CO2 23 21 - 32 mmol/L    Anion gap 8 3.0 - 18 mmol/L    Glucose 86 74 - 99 mg/dL    BUN 11 7.0 - 18 MG/DL    Creatinine 0.72 0.6 - 1.3 MG/DL    BUN/Creatinine ratio 15 12 - 20      GFR est AA >60 >60 ml/min/1.73m2    GFR est non-AA >60 >60 ml/min/1.73m2    Calcium 7.5 (L) 8.5 - 10.1 MG/DL    Bilirubin, total 1.0 0.2 - 1.0 MG/DL    ALT (SGPT) 61 (H) 13 - 56 U/L    AST (SGOT) 222 (H) 10 - 38 U/L    Alk. phosphatase 221 (H) 45 - 117 U/L    Protein, total 4.6 (L) 6.4 - 8.2 g/dL    Albumin 1.8 (L) 3.4 - 5.0 g/dL    Globulin 2.8 2.0 - 4.0 g/dL    A-G Ratio 0.6 (L) 0.8 - 1.7     CBC WITH AUTOMATED DIFF    Collection Time: 10/09/19  2:04 AM   Result Value Ref Range    WBC 14.9 (H) 4.6 - 13.2 K/uL    RBC 3.59 (L) 4.20 - 5.30 M/uL    HGB 9.8 (L) 12.0 - 16.0 g/dL    HCT 31.1 (L) 35.0 - 45.0 %    MCV 86.6 74.0 - 97.0 FL    MCH 27.3 24.0 - 34.0 PG    MCHC 31.5 31.0 - 37.0 g/dL    RDW 16.8 (H) 11.6 - 14.5 %    PLATELET 861 590 - 286 K/uL    MPV 9.0 (L) 9.2 - 11.8 FL    NEUTROPHILS 49 42 - 75 %    BAND NEUTROPHILS 45 (H) 0 - 5 %    LYMPHOCYTES 4 (L) 20 - 51 %    MONOCYTES 1 (L) 2 - 9 %    EOSINOPHILS 0 0 - 5 %    BASOPHILS 1 0 - 3 %    ABS. NEUTROPHILS 7.3 1.8 - 8.0 K/UL    ABS. LYMPHOCYTES 0.6 (L) 0.8 - 3.5 K/UL    ABS. MONOCYTES 0.1 0 - 1.0 K/UL    ABS. EOSINOPHILS 0.0 0.0 - 0.4 K/UL    ABS.  BASOPHILS 0.1 0.0 - 0.1 K/UL    RBC COMMENTS ANISOCYTOSIS  1+        WBC COMMENTS TOXIC GRANULATION      DF MANUAL     LACTIC ACID    Collection Time: 10/09/19  2:04 AM   Result Value Ref Range    Lactic acid 2.7 (HH) 0.4 - 2.0 MMOL/L   LACTIC ACID    Collection Time: 10/09/19  6:01 AM   Result Value Ref Range    Lactic acid 2.4 (HH) 0.4 - 2.0 MMOL/L

## 2019-10-09 NOTE — PROGRESS NOTES
Hospitalist Progress Note-critical care note     Patient: Elbert Hi MRN: [id number]  CSN: [id number]    YOB: 1950  Age: 71 y.o. Sex: female    DOA: 10/8/2019 LOS:  LOS: 1 day            Chief complaint: septic shock, sepsis. Renal stone, pyelonephritis     Assessment/Plan         Hospital Problems  Date Reviewed: 10/8/2019          Codes Class Noted POA    Septic shock ([facility] [address] ICD-10-CM: A41.9, R65.21  ICD-9-CM: 038.9, 785.52, 995.92  10/9/2019 Unknown        Sepsis ([facility] [address] ICD-10-CM: A41.9  ICD-9-CM: 038.9, 995.91  10/8/2019 Unknown        * (Principal) Renal stone ICD-10-CM: N20.0  ICD-9-CM: 592.0  10/7/2019 Yes        Pyelonephritis ICD-10-CM: N12  ICD-9-CM: 590.80  8/31/2019 Unknown              Septic shock   On levophed   Continue wean off as bp can tolerate  Lactic acid got improving,but still elevated , continue iv hydration     Pyelonephritis   Continue Levaquin and rocephin and f/u with cx     Renal stone  Cystoscopy, right retrograde pyelogram with interpretation, right ureteroscopy with holmium laser lithotripsy and stent change    Subjective: feel better, still tired  rn : lactic acid 2.6 , on 5 levophed       Disposition :tbd,   Review of systems:    General: No fevers or chills. Tired   Cardiovascular: No chest pain or pressure. No palpitations. Pulmonary: No shortness of breath. Gastrointestinal: No nausea, vomiting. Vital signs/Intake and Output:  Visit Vitals  /74   Pulse (!) 101   Temp 98.4 °F (36.9 °C)   Resp 27   Ht 4' 11\" (1.499 m)   Wt 41.3 kg (91 lb)   SpO2 99%   Breastfeeding? No   BMI 18.38 kg/m²     Current Shift:  10/09 0701 - 10/09 1900  In: 1068.4 [I.V.:1068.4]  Out: 500 [Urine:500]  Last three shifts:  10/07 1901 - 10/09 0700  In: 3500 [P.O.:600; I.V.:2900]  Out: 1050 [Urine:1050]    Physical Exam:  General: WD, WN. Alert, cooperative, no acute distress    HEENT: NC, Atraumatic. PERRLA, anicteric sclerae. Lungs: CTA Bilaterally.  No Wheezing/Rhonchi/Rales. Heart:  Regular  rhythm,  No murmur, No Rubs, No Gallops  Abdomen: Soft, Non distended, Non tender.  +Bowel sounds,   Extremities: No c/c/e  Psych:   Not anxious or agitated. Neurologic:  No acute neurological deficit. Labs: Results:       Chemistry Recent Labs     10/09/19  0204   GLU 86      K 3.8      CO2 23   BUN 11   CREA 0.72   CA 7.5*   AGAP 8   BUCR 15   *   TP 4.6*   ALB 1.8*   GLOB 2.8   AGRAT 0.6*      CBC w/Diff Recent Labs     10/09/19  0204 10/08/19  1734   WBC 14.9* 1.1*   RBC 3.59* 4.00*   HGB 9.8* 10.9*   HCT 31.1* 34.7*    342   GRANS 49 40*   LYMPH 4* 34   EOS 0 2      Cardiac Enzymes No results for input(s): CPK, CKND1, JOSE in the last 72 hours. No lab exists for component: CKRMB, TROIP   Coagulation No results for input(s): PTP, INR, APTT, INREXT in the last 72 hours. Lipid Panel No results found for: CHOL, CHOLPOCT, CHOLX, CHLST, CHOLV, 054919, HDL, HDLP, LDL, LDLC, DLDLP, 624532, VLDLC, VLDL, TGLX, TRIGL, TRIGP, TGLPOCT, CHHD, CHHDX   BNP No results for input(s): BNPP in the last 72 hours.    Liver Enzymes Recent Labs     10/09/19  0204   TP 4.6*   ALB 1.8*   *   SGOT 222*      Thyroid Studies No results found for: T4, T3U, TSH, TSHEXT     Procedures/imaging: see electronic medical records for all procedures/Xrays and details which were not copied into this note but were reviewed prior to creation of Jeremy Rodriguez MD

## 2019-10-09 NOTE — CDMP QUERY
Pt admitted with pyelonephritis, sepsis /Pt noted to have hypotension. If possible, please document in the progress notes and d/c summary if you are evaluating and / or treating any of the following: ? Hemorrhagic Shock ? Septic Shock ? Hypovolemic Shock ? Other Shock, Please specify ? Other, please specify ? Clinically unable to determine The medical record reflects the following: 
   Risk Factors: 70 yo female admitted with pyelonephritis Clinical Indicators: 10/8 Hospitalist PN  RRT called on this patient who is post op s/p cystoscopy, right retrograde pyelogram, right ureteroscopy, laser lithotripsy and stent change. Post op she is noted to have low BP and tachycardia 
 
=Per NN  BP low 64/41 MAP 46 Treatment: Bolus 500 cc NS x 3, Pressor support Thank you, 
Anita Lund RN -[phone]

## 2019-10-09 NOTE — PROGRESS NOTES
Antimicrobial Stewardship Team Note    [facility] Antimicrobial Recommendation    Patient: Elie Elliott  MRN#: [id number]  Attending: No name on file. Admission Date: [id number]    Current Antimicrobial Medications  Current Antimicrobial Therapy (168h ago, onward)      Ordered     Start Stop    10/08/19 2143  levoFLOXacin (LEVAQUIN) 500 mg in D5W IVPB  500 mg,   IntraVENous,   EVERY 24 HOURS      10/09/19 1400 --    10/08/19 1517  levoFLOXacin (LEVAQUIN) 500 mg tablet  500 mg,   Oral,   DAILY      10/09/19 0000 --    10/08/19 2011  piperacillin-tazobactam (ZOSYN) 3.375 g in 0.9% sodium chloride (MBP/ADV) 100 mL MBP  3.375 g,   IntraVENous,   EVERY 6 HOURS      10/08/19 2100 --    10/08/19 1746  cefTRIAXone (ROCEPHIN) 1 g in sterile water (preservative free) 10 mL IV syringe  1 g,   IntraVENous,   EVERY 24 HOURS      10/08/19 1800 --              Current Indication/Therapy  Levofloxacin 500 mg IV q 24 hr      Assessment/Recommendation   Dosage in Adult Patients with Normal Renal Function (creatinine clearance ?  50 mL/min)  CrCl 49.5 ml/min   if CrCl changes change dosing frequency     Submitted by: VARUN Mercado [facility]

## 2019-10-09 NOTE — CONSULTS
[facility]  Pulmonary, Critical Care, and Sleep Medicine    Initial Patient Consult    Name: Jolene Pacheco MRN: [id number]   : 1950 Hospital: [facility] [place]   Date: 10/9/2019  Room: 109/01     Subjective: This patient has been seen and evaluated at the request of Dr.D DIEHL [facility] for patient with UTI and sepsis. Patient is a 71 y.o. 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 Patient was admitted in 2019 UTIs and kidney stones. She was discharged and then underwent cystoscopy and RT ureteral stent placement 19. Yesterday, she came for elective cystoscopy, right ureteroscopy with holmium laser lithotripsy and stent change. Postop she developed tachycardia, sepsis and moved to icu. She was hypotensive overnight despite fluids and started levophed. Patient awake, alert this am.  No pains. No SOB or cough. Afebrile; BP stable on 2 mcg/min levophed.  ml overnight. Past Medical History:   Diagnosis Date    Asthma     Depression     Emphysema of lung (HCC)     Gastroparesis     Hypothyroidism     Malabsorption     of fat    Pancreatic insufficiency     Restless leg     bilateral      Past Surgical History:   Procedure Laterality Date    ABDOMEN SURGERY PROC UNLISTED      HX HERNIA REPAIR      HX SMALL BOWEL RESECTION      intra aortic baloon pump    HX TUBAL LIGATION        Prior to Admission medications    Medication Sig Start Date End Date Taking? Authorizing Provider   levoFLOXacin (LEVAQUIN) 500 mg tablet Take 1 Tab by mouth daily. 10/9/19  Yes Roel Shaw MD   HYDROcodone-acetaminophen [facility]) 5-325 mg per tablet Take 1 Tab by mouth every four (4) hours as needed for Pain for up to 5 days. Max Daily Amount: 6 Tabs.  10/8/19 10/13/19 Yes Roel Shaw MD umeclidinium (INCRUSE ELLIPTA) 62.5 mcg/actuation inhaler Take 1 Puff by inhalation daily. Yes Provider, Historical   cyanocobalamin 1,000 mcg tablet Take 1,000 mcg by mouth daily. Yes Provider, Historical   montelukast (SINGULAIR) 10 mg tablet Take 4 mg by mouth daily. Yes Provider, Historical   multivitamin with iron tablet Take 1 Tab by mouth daily. Yes Provider, Historical   lipase-protease-amylase (CREON) 12,000-38,000 -60,000 unit capsule Take 1 Cap by mouth three (3) times daily (with meals). Indications: with snack   Yes Provider, Historical   fluticasone propion-salmeterol (ADVAIR DISKUS) 500-50 mcg/dose diskus inhaler Take 1 Puff by inhalation every twelve (12) hours. Yes Provider, Historical   TEMAZEPAM PO Take 30 mg by mouth as needed. Yes Other, MD Aaron   levothyroxine (SYNTHROID) 112 mcg tablet Take 88 mcg by mouth Daily (before breakfast). Yes Other, MD Aaron   lipase-protease-amylase (CREON) 12,000-38,000 -60,000 unit capsule Take 2 Caps by mouth three (3) times daily (with meals). Yes Other, MD Aaron   loperamide (IMODIUM) 2 mg capsule Take 2 mg by mouth four (4) times daily as needed for Diarrhea. Yes Other, MD Aaron   buPROPion XL (WELLBUTRIN XL) 150 mg tablet Take 300 mg by mouth every morning. Yes Other, MD Aaron   DULoxetine (CYMBALTA) 20 mg capsule Take 60 mg by mouth daily. Yes Other, MD Aaron   albuterol (PROVENTIL HFA, VENTOLIN HFA, PROAIR HFA) 90 mcg/actuation inhaler Take 1 Puff by inhalation every four (4) hours as needed for Wheezing. 4/19/19  Yes Ramandeep Villanueva MD   hydrOXYzine HCl (ATARAX) 25 mg tablet Take 25 mg by mouth two (2) times daily as needed for Anxiety or Itching. Provider, Historical   naloxone (NARCAN) 4 mg/actuation nasal spray 1 [place]aine by IntraNASal route once as needed for Other (opiod overdose). Provider, Historical   acetaminophen (TYLENOL EXTRA STRENGTH) 500 mg tablet Take 500 mg by mouth every four (4) hours as needed for Pain. Provider, Historical   guaiFENesin ER (MUCINEX) 600 mg ER tablet Take 1,200 mg by mouth as needed for Congestion. Provider, Historical   diphenhydrAMINE (BENADRYL ALLERGY) 25 mg tablet Take 25 mg by mouth as needed for Sleep. Provider, Historical   ropinirole HCl (REQUIP PO) Take 1 mg by mouth as needed for Other (restless leg syndrome). Other, MD Aaron   nystatin-triamcinolone (MYCOLOG II) topical cream Apply  to affected area three (3) times daily. 4/21/19   KATHLEEN Hickey   albuterol-ipratropium (DUO-NEB) 2.5 mg-0.5 mg/3 ml nebu 3 mL by Nebulization route. Other, MD Aaron     No Known Allergies   Social History     Tobacco Use    Smoking status: Former Smoker    Smokeless tobacco: Never Used   Substance Use Topics    Alcohol use: Never     Frequency: Never      History reviewed. No pertinent family history.      Current Facility-Administered Medications   Medication Dose Route Frequency    levoFLOXacin (LEVAQUIN) 500 mg in D5W IVPB  500 mg IntraVENous Q24H    NOREPINephrine (LEVOPHED) 8 mg in 0.9% NS 250ml infusion  0.5-16 mcg/min IntraVENous TITRATE    NOREPINephrine (LEVOPHED) 8 mg in dextrose 5% 250 mL infusion  0.5-16 mcg/min IntraVENous TITRATE    potassium chloride (K-DUR, KLOR-CON) SR tablet 20 mEq  20 mEq Oral NOW    cefTRIAXone (ROCEPHIN) 1 g in sterile water (preservative free) 10 mL IV syringe  1 g IntraVENous Q24H    buPROPion XL (WELLBUTRIN XL) tablet 300 mg  300 mg Oral 7am    DULoxetine (CYMBALTA) capsule 60 mg  60 mg Oral DAILY    levothyroxine (SYNTHROID) tablet 87.5 mcg  87.5 mcg Oral ACB    montelukast (SINGULAIR) tablet 10 mg  10 mg Oral DAILY    multivitamin, tx-iron-ca-min (THERA-M w/ IRON) tablet 1 Tab  1 Tab Oral DAILY    nystatin-triamcinolone (MYCOLOG II) 100,000-0.1 unit/g-% cream   Topical TID    umeclidinium (INCRUSE ELLIPTA) 62.5 mcg/actuation  1 Puff Inhalation DAILY    sodium chloride (NS) flush 5-40 mL  5-40 mL IntraVENous Q8H    0.9% sodium chloride infusion  125 mL/hr IntraVENous CONTINUOUS    piperacillin-tazobactam (ZOSYN) 3.375 g in 0.9% sodium chloride (MBP/ADV) 100 mL MBP  3.375 g IntraVENous Q6H    lipase-protease-amylase (ZENPEP 10,000) capsule 1 Cap  1 Cap Oral TID WITH MEALS       Latest lactic acid:   Lactic acid   Date Value Ref Range Status   10/09/2019 2.4 (HH) 0.4 - 2.0 MMOL/L Final     Comment:     CALLED TO AND CORRECTLY REPEATED BY:  Charleen Goff RN ICU TO  AT [id number] ON [id number]     10/09/2019 2.7 (HH) 0.4 - 2.0 MMOL/L Final     Comment:     CALLED TO AND CORRECTLY REPEATED BY:  Charleen Goff RN ICU TO   [facility] ON [id number]     10/08/2019 4.8 (HH) 0.4 - 2.0 MMOL/L Final     Comment:     CALLED TO AND CORRECTLY REPEATED BY:  BEKA DOVER RN ICU, TO [facility] AT 1018       Review of Systems:  Ears, nose, mouth, throat, and face: No epistaxis, no difficulty in swallowing  Respiratory: as above  Cardiovascular: no chest pain or palpitations, no chronic leg edema, no syncope  Gastrointestinal: no abd pain, vomitting or diarrhea, no bleeding symptoms; + chronic malnutrition state  Genitourinary: as above  Integument/breast: No ulcers  Musculoskeletal:Neg  Neurological: No focal weakness or seizures or headaches  Behvioral/Psych: No anxiety or depression  Constitutional: No fever or chills; + chronic weight loss       Objective:   Vital Signs:    Visit Vitals  /65   Pulse 93   Temp 98.4 °F (36.9 °C)   Resp 22   Ht 4' 11\" (1.499 m)   Wt 41.3 kg (91 lb)   SpO2 98%   Breastfeeding? No   BMI 18.38 kg/m²       O2 Device: Nasal cannula   O2 Flow Rate (L/min): 2 l/min   Temp (24hrs), Av.4 °F (37.4 °C), Min:97.8 °F (36.6 °C), Max:100.9 °F (38.3 °C)       Intake/Output:   Last shift:      No intake/output data recorded. Last 3 shifts: 10/07 1901 - 10/09 0700  In: 3500 [P.O.:600;  I.V.:2900]  Out: 1050 [Urine:1050]    Intake/Output Summary (Last 24 hours) at 10/9/2019 0956  Last data filed at 10/9/2019 0205  Gross per 24 hour   Intake 3500 ml Output 1050 ml   Net 2450 ml         Physical Exam:   Comfortable; appears older than stated age; thin and frail; on 2 L nc o2; acyanotic  HEENT: pupils not dilated, no scleral jaundice, moist oral mucosa  Neck: No adenopathy or thyroid swelling  CVS: S1S2 no murmurs; JVD not elevated  RS: Mod air entry bilaterally, symmetrical BS; no wheezes or crackles  Abd: soft, non tender, no hepatosplenomegaly, no abd distension, no guarding or rigidity, bowel sounds heard; abd wall dressing noted  Neuro: awake, alert, moving all extremities   Extrm: no leg edema or swelling or clubbing  Skin: no rash  Lymphatic: no cervical or supraclavicular adenopathy    Telemetry: SR    Data review:     Recent Results (from the past 24 hour(s))   CBC WITH AUTOMATED DIFF    Collection Time: 10/08/19  5:34 PM   Result Value Ref Range    WBC 1.1 (L) 4.6 - 13.2 K/uL    RBC 4.00 (L) 4.20 - 5.30 M/uL    HGB 10.9 (L) 12.0 - 16.0 g/dL    HCT 34.7 (L) 35.0 - 45.0 %    MCV 86.8 74.0 - 97.0 FL    MCH 27.3 24.0 - 34.0 PG    MCHC 31.4 31.0 - 37.0 g/dL    RDW 17.0 (H) 11.6 - 14.5 %    PLATELET 704 987 - 787 K/uL    MPV 8.7 (L) 9.2 - 11.8 FL    NEUTROPHILS 40 (L) 42 - 75 %    BAND NEUTROPHILS 18 (H) 0 - 5 %    LYMPHOCYTES 34 20 - 51 %    MONOCYTES 2 2 - 9 %    EOSINOPHILS 2 0 - 5 %    BASOPHILS 4 (H) 0 - 3 %    ABS. NEUTROPHILS 0.4 (L) 1.8 - 8.0 K/UL    ABS. LYMPHOCYTES 0.4 (L) 0.8 - 3.5 K/UL    ABS. MONOCYTES 0.0 0 - 1.0 K/UL    ABS. EOSINOPHILS 0.0 0.0 - 0.4 K/UL    ABS.  BASOPHILS 0.0 0.0 - 0.1 K/UL    RBC COMMENTS ANISOCYTOSIS  1+        DF MANUAL     LACTIC ACID    Collection Time: 10/08/19  5:34 PM   Result Value Ref Range    Lactic acid 2.1 (HH) 0.4 - 2.0 MMOL/L   CULTURE, BLOOD    Collection Time: 10/08/19  5:34 PM   Result Value Ref Range    Special Requests: NO SPECIAL REQUESTS      Culture result: NO GROWTH AFTER 12 HOURS     LACTIC ACID    Collection Time: 10/08/19  9:30 PM   Result Value Ref Range    Lactic acid 4.8 (HH) 0.4 - 2.0 MMOL/L METABOLIC PANEL, COMPREHENSIVE    Collection Time: 10/09/19  2:04 AM   Result Value Ref Range    Sodium 140 136 - 145 mmol/L    Potassium 3.8 3.5 - 5.5 mmol/L    Chloride 109 100 - 111 mmol/L    CO2 23 21 - 32 mmol/L    Anion gap 8 3.0 - 18 mmol/L    Glucose 86 74 - 99 mg/dL    BUN 11 7.0 - 18 MG/DL    Creatinine 0.72 0.6 - 1.3 MG/DL    BUN/Creatinine ratio 15 12 - 20      GFR est AA >60 >60 ml/min/1.73m2    GFR est non-AA >60 >60 ml/min/1.73m2    Calcium 7.5 (L) 8.5 - 10.1 MG/DL    Bilirubin, total 1.0 0.2 - 1.0 MG/DL    ALT (SGPT) 61 (H) 13 - 56 U/L    AST (SGOT) 222 (H) 10 - 38 U/L    Alk. phosphatase 221 (H) 45 - 117 U/L    Protein, total 4.6 (L) 6.4 - 8.2 g/dL    Albumin 1.8 (L) 3.4 - 5.0 g/dL    Globulin 2.8 2.0 - 4.0 g/dL    A-G Ratio 0.6 (L) 0.8 - 1.7     CBC WITH AUTOMATED DIFF    Collection Time: 10/09/19  2:04 AM   Result Value Ref Range    WBC 14.9 (H) 4.6 - 13.2 K/uL    RBC 3.59 (L) 4.20 - 5.30 M/uL    HGB 9.8 (L) 12.0 - 16.0 g/dL    HCT 31.1 (L) 35.0 - 45.0 %    MCV 86.6 74.0 - 97.0 FL    MCH 27.3 24.0 - 34.0 PG    MCHC 31.5 31.0 - 37.0 g/dL    RDW 16.8 (H) 11.6 - 14.5 %    PLATELET 351 634 - 156 K/uL    MPV 9.0 (L) 9.2 - 11.8 FL    NEUTROPHILS 49 42 - 75 %    BAND NEUTROPHILS 45 (H) 0 - 5 %    LYMPHOCYTES 4 (L) 20 - 51 %    MONOCYTES 1 (L) 2 - 9 %    EOSINOPHILS 0 0 - 5 %    BASOPHILS 1 0 - 3 %    ABS. NEUTROPHILS 7.3 1.8 - 8.0 K/UL    ABS. LYMPHOCYTES 0.6 (L) 0.8 - 3.5 K/UL    ABS. MONOCYTES 0.1 0 - 1.0 K/UL    ABS. EOSINOPHILS 0.0 0.0 - 0.4 K/UL    ABS.  BASOPHILS 0.1 0.0 - 0.1 K/UL    RBC COMMENTS ANISOCYTOSIS  1+        WBC COMMENTS TOXIC GRANULATION      DF MANUAL     LACTIC ACID    Collection Time: 10/09/19  2:04 AM   Result Value Ref Range    Lactic acid 2.7 (HH) 0.4 - 2.0 MMOL/L   LACTIC ACID    Collection Time: 10/09/19  6:01 AM   Result Value Ref Range    Lactic acid 2.4 (HH) 0.4 - 2.0 MMOL/L           No results for input(s): FIO2I, IFO2, HCO3I, IHCO3, HCOPOC, PCO2I, PCOPOC, IPHI, PHI, PHPOC, PO2I, PO2POC in the last 72 hours. No lab exists for component: IPOC2    All Micro Results     Procedure Component Value Units Date/Time    CULTURE, BLOOD [[id number]] Collected:  10/08/19 1734    Order Status:  Completed Specimen:  Blood Updated:  10/09/19 0704     Special Requests: NO SPECIAL REQUESTS        Culture result: NO GROWTH AFTER 12 HOURS       CULTURE, URINE [[id number]] Collected:  10/08/19 1800    Order Status:  Completed Specimen:  Cath Urine Updated:  10/08/19 2234          Imaging:  [x]I have personally reviewed the patients chest radiographs images and report     Results from [place] encounter on 10/08/19   XR CHEST PORT    Narrative A portable AP radiograph of the chest was obtained at 11:15 hours:  INDICATION:  COPD. COMPARISON:  Multiple prior studies most recent being 9/25/2019. FINDINGS:      Heart and mediastinum: Unremarkable. Lungs and pleura: Clear without consolidation or pleural effusion. Lungs are  hyperinflated. Aorta: Partially calcified and mildly tortuous. Bones: Within normal limits for age. Other: Postop changes noted right shoulder. Impression Impression:    Hyperinflated lungs may be due to underlying emphysema. No new abnormality is  seen developing since last study. Results from [place] encounter on 09/25/19   CT ABD PELV W CONT    Narrative EXAM: CT of the abdomen and pelvis    INDICATION: Abdominal pain, flank pain. Right-sided abdominal pain. COMPARISON: 8/31/2019, 4/28/2019    TECHNIQUE: Axial CT imaging of the abdomen and pelvis was performed with  intravenous contrast. Multiplanar reformats were generated. One or more dose  reduction techniques were used on this CT: automated exposure control,  adjustment of the mAs and/or kVp according to patient size, and iterative  reconstruction techniques. The specific techniques used on this CT exam have  been documented in the patient's electronic medical record.  Digital Imaging and  Communications in the Medicine (DICOM) format image data are available to  nonaffiliated external healthcare facilities or entities on a secure, media  free, reciprocally searchable basis with patient authorization for at least a 12  month period after this study. _______________    FINDINGS:    LOWER CHEST: Emphysematous changes. Regions of basilar scarring-atelectasis. LIVER, BILIARY: Liver is normal. Gallbladder is moderately distended but  thin-walled. There is nonspecific dilatation of the common bile duct, 9 mm in  diameter. PANCREAS: Normal.    SPLEEN: Normal.    ADRENALS: Normal.    KIDNEYS: There is severe right perirenal-retroperitoneal inflammatory stranding  with marked urothelial enhancement of the right renal pelvis and proximal ureter  with an associated nonobstructing calculus within the left renal pelvis which  measures approximately 8 mm in diameter. Kidneys enhance  symmetrically-homogenously. LYMPH NODES: No enlarged lymph nodes. GASTROINTESTINAL TRACT: No bowel obstruction or acute appearing wall thickening. Previous surgical anastomosis involving the stomach and bowel noted. PELVIC ORGANS: Stable right pelvic ovoid cyst, 42 x 20 mm. [facility] VASCULATURE: Unremarkable. BONES: Interval but remote appearing superior endplate compression deformity at  L4. No acute or aggressive osseous abnormalities identified. OTHER: None.    _______________      Impression IMPRESSION:    1. There is severe right perirenal-retroperitoneal inflammatory stranding with  marked urothelial enhancement of the right renal pelvis and proximal ureter with  an associated nonobstructing calculus within the left renal pelvis which  measures approximately 8 mm in diameter. Findings suggest severe pyelitis. 2. Gallbladder is moderately distended but thin-walled. There is nonspecific  dilatation of the common bile duct, 9 mm in diameter.              IMPRESSION:   · Sepsis  · UTI  · Septic shock  · Renal stone   · Malnutrition     Patient Active Problem List   Diagnosis Code    Enterocutaneous fistula K63.2    Ureterolithiasis N20.1    Renal stone N20.0    Sepsis ([facility] A41.9    Septic shock ([facility] A41.9, R65.21    Malnutrition ([facility] E46 ·   COPD J44.9  ·       RECOMMENDATIONS:   · Pulm: stable respirations; incruse inhaler daily; prn duoneb; nc o2; wean fio2 for o2 sats >91%  · Cardiac: wean levophed for SBP>95 mmhg; IV fluids and albumin q6; check echo  · ID: blood and urine cx neg; continue ceftriaxone and levaquin for now; stopped zosyn; prior urine cx showed klebsiella-pansensitive  · Renal: watch IOs and renal fn  · GI: oral diet as tolerated; risks for refeeding syndrome; check mag and phos daily  · Neuro: stable   · Hem: watch Hb and plts  · Fluids: NS maintenance 125 /hr  · Nutrition: oral diet  · Proph:  DVt and GI proph-sc heparin and PPI  · D/w patient and updated medical management  · D/w RN; wean pressor; if BP stable, can transfer to tele floor   Will defer respective systems problem management to primary and other consultant and follow patient in ICU with primary and other medical team  Further recommendations will be based on the patient's response to recommended treatment and results of the investigation ordered. Quality Care: PPI, DVT prophylaxis, HOB elevated, Infection control all reviewed and addressed. · Lines/Tubes: PIVs; rivera cath 10/9  ADVANCE DIRECTIVE: DNR status; noted [place] DNR document in chart; discussed with patient in layman terms; she confirms NO CPR or chest compressions or shocks or breathing tube/life support; consulted palliative care and POST form done. Patient assures her family is aware of her decision.      · Thank you for the consult     High complexity decision making was performed in this consultation and evaluation of this patient who is at high risk for decompensation with multiple organ involvement  Total critical care time spent rendering care exclusive of procedures: 38 minutes         Jose De Jesus Hills MD

## 2019-10-09 NOTE — PERIOP NOTES
TRANSFER - OUT REPORT:    Verbal report given to Cat MONSIVAIS(name) on Nasrin  being transferred to ICU(unit) for change in patient status. Report consisted of patients Situation, Background, Assessment and   Recommendations(SBAR). Information from the following report(s) OR Summary, Intake/Output and MAR was reviewed with the receiving nurse. Lines:   Peripheral IV 10/08/19 Right Forearm (Active)   Site Assessment Clean, dry, & intact 10/8/2019  3:50 PM   Phlebitis Assessment 0 10/8/2019  3:50 PM   Infiltration Assessment 0 10/8/2019  3:50 PM   Dressing Status Clean, dry, & intact 10/8/2019  3:50 PM   Dressing Type Transparent;Tape 10/8/2019  3:50 PM   Hub Color/Line Status Blue; Infusing;Patent 10/8/2019  3:50 PM   Action Taken Armboard 10/8/2019  2:32 PM        Opportunity for questions and clarification was provided.       Patient transported with:   Monitor  O2 @ 2 liters  Registered Nurse  Tech     Intake/Output Summary (Last 24 hours) at 10/8/2019 2042  Last data filed at 10/8/2019 2020  Gross per 24 hour   Intake 3500 ml   Output 450 ml   Net 3050 ml

## 2019-10-09 NOTE — PERIOP NOTES
Dr. Joaine Warren notified of patients blood pressure and ICU unable to accept patient. Fluid bolus of 500 ordered.

## 2019-10-09 NOTE — DIABETES MGMT
GLYCEMIC CONTROL & NUTRITION:      - Discussed in rounds and chart reviewed, no known h/o DM  - No glycemic control needs identified at this time    Recent Glucose Results:   Lab Results   Component Value Date/Time    GLU 86 10/09/2019 02:04 AM         Paige Reyes MS, RN, CDE  Glycemic Control Team  [phone]  Pager [phone] (M-TH 8:00-4:30P)  *After Hours pager [phone]

## 2019-10-09 NOTE — PROGRESS NOTES
0730: Verbal shift change report given to Rnadi Thorpe (oncoming nurse) by Sai Tao (offgoing nurse). Report included the following information SBAR, Kardex, Intake/Output, MAR, Recent Results and Cardiac Rhythm NSR/ST.   0800: Shift assessment completed. See flowsheets. Patient alert and oriented x4. HR NSR. Lung sounds diminished. NAD. Pulses palpable. Voiding carlton urine. Fistula dressing CDI. Patient with some n/v. Unable to take all PO medications. 0945: Paged MD Wendy Steven regarding rivera because patient is now voiding. 1000: per MD Nance, if patient is continuing  to void via purewick and we are getting accurate Is and Os, OK to continue WITHOUT rivera. If patient does not, place rivera for accurate I and Os. 1045: LA 2.6. Will notify MD Qamar Brumfield.   1200: Rivera inserted per MD Nance for retention. Reassessment completed. See flowsheets. 1530: MD Qamar Brumfield paged regarding LA 2.9. Order for 500 cc bolus. 1600: Reassessment completed. See flowsheets  2999: Levophed turned off. MAP 75.  1930: Verbal shift change report given to Jessi Ramirez (oncoming nurse) by Randi Thorpe (offgoing nurse). Report included the following information SBAR, Kardex, ED Summary, Intake/Output, MAR and Recent Results.

## 2019-10-09 NOTE — PROGRESS NOTES
2020 - Verbal transfer report given to Jm Myers RN (oncoming nurse) by  RN (offgoing nurse). Report included the following information SBAR, Kardex, Procedure Summary, Intake/Output, MAR, Recent Results and Cardiac Rhythm ST.   2045 - Pt admit to ICU, NAD. Assessment completed. Bp lower than report BP, alvarado jones. Fistula dressing changed. See EMR for full details. 2150 - BP continues to be low, NS bous 500 ordered. IVF NS at 125 ordered. 2232 - IV tylenol one dose for pain due to low bp, zofran ordered for nausea. Fistula dressing changed. EJ approved for pt.   2300 - EJ inserted  2320 - Pt eating, Zenpep start time changed for evening meal.   0030 - Pt reassessed. Minimal changes at this time, BP continues to be on low side at times. Fistula dressing changed. Will continue to monitor, see EMR for full details. 0200 - BP continues to be low, second 500cc bolus ordered, Discussed pt inabilty to void, bladder scan ordered and straight cath if pt is unable to void and greater than 500 in bladder. Fistula dressing changed. 0230 - Bladder scan completed for 556, straight cathed for 600mls   0305 - Discussed continued hypotension, levophed ordered. Fistula dressing changed  0315 - Levo started at 0.5mcg/min. 0340 - Increase levo for continued hypotension 78/50 map 56.  0344 - Pt complaining of intense headache, crying in pain. \"My head is going to explode\". /112. Levo turned off, pt reverse trendelenburg. Pt vomiting.   0348 - Pt states that headache has resolved. Nausea has subsided. BP 80/46 Map   0410 - BP continues to be hypotensive. Levo restarted. Pt reassessed, see EMR for full details. Fistula dressing changed. 0500 - MD at bedside. 500NS bolus ordered. Fistula dressing changed. 2439 - Bedside and Verbal shift change report given to Jarad Fuller RN (oncoming nurse) by Jm Myers RN (offgoing nurse).  Report included the following information SBAR, Kardex, Procedure Summary, Intake/Output, MAR and Cardiac Rhythm SR. Fisula dressing changed. Levo at 2.5mcg/min.

## 2019-10-10 ENCOUNTER — APPOINTMENT (OUTPATIENT)
Dept: NON INVASIVE DIAGNOSTICS | Age: 69
DRG: 856 | End: 2019-10-10
Attending: INTERNAL MEDICINE
Payer: MEDICARE

## 2019-10-10 LAB
ANION GAP SERPL CALC-SCNC: 8 MMOL/L (ref 3–18)
BACTERIA SPEC CULT: NORMAL
BASOPHILS # BLD: 0.1 K/UL (ref 0–0.1)
BASOPHILS NFR BLD: 0 % (ref 0–2)
BUN SERPL-MCNC: 11 MG/DL (ref 7–18)
BUN/CREAT SERPL: 20 (ref 12–20)
CALCIUM SERPL-MCNC: 8 MG/DL (ref 8.5–10.1)
CHLORIDE SERPL-SCNC: 118 MMOL/L (ref 100–111)
CO2 SERPL-SCNC: 18 MMOL/L (ref 21–32)
CREAT SERPL-MCNC: 0.55 MG/DL (ref 0.6–1.3)
DIFFERENTIAL METHOD BLD: ABNORMAL
ECHO AO ROOT DIAM: 2.89 CM
ECHO AV AREA VTI: 2.9 CM2
ECHO AV MEAN GRADIENT: 4 MMHG
ECHO AV PEAK GRADIENT: 6.3 MMHG
ECHO AV PEAK VELOCITY: 125 CM/S
ECHO IVC PROX: 1.7 CM
ECHO LA AREA 4C: 2.7 CM2
ECHO LA MAJOR AXIS: 3.1 CM
ECHO LA VOL 2C: 34 ML (ref 22–52)
ECHO LA VOL 4C: 30.6 ML (ref 22–52)
ECHO LA VOLUME INDEX A2C: 25.77 ML/M2 (ref 16–28)
ECHO LA VOLUME INDEX A4C: 23.19 ML/M2 (ref 16–28)
ECHO LV E' LATERAL VELOCITY: 8 CM/S
ECHO LV E' SEPTAL VELOCITY: 11 CM/S
ECHO LV EDV A2C: 46 ML
ECHO LV EDV A4C: 52 ML
ECHO LV EDV BP: 52 ML (ref 56–104)
ECHO LV EDV INDEX A4C: 39.4 ML/M2
ECHO LV EDV INDEX BP: 39.4 ML/M2
ECHO LV EDV NDEX A2C: 34.9 ML/M2
ECHO LV EJECTION FRACTION A2C: 36 %
ECHO LV EJECTION FRACTION A4C: 54 %
ECHO LV EJECTION FRACTION BIPLANE: 53 % (ref 55–100)
ECHO LV ESV A2C: 29 ML
ECHO LV ESV A4C: 28 ML
ECHO LV ESV BP: 25 ML (ref 19–49)
ECHO LV ESV INDEX A2C: 22 ML/M2
ECHO LV ESV INDEX A4C: 21.2 ML/M2
ECHO LV ESV INDEX BP: 18.9 ML/M2
ECHO LV INTERNAL DIMENSION DIASTOLIC: 3.5 CM (ref 3.9–5.3)
ECHO LV INTERNAL DIMENSION SYSTOLIC: 2.6 CM
ECHO LV IVSD: 1 CM (ref 0.6–0.9)
ECHO LV POSTERIOR WALL DIASTOLIC: 1 CM (ref 0.6–0.9)
ECHO LVOT DIAM: 2.9 CM
ECHO LVOT PEAK GRADIENT: 2.5 MMHG
ECHO LVOT PEAK VELOCITY: 79 CM/S
ECHO LVOT VTI: 21.9 CM
ECHO MV A VELOCITY: 102 CM/S
ECHO MV E DECELERATION TIME (DT): 153 MS
ECHO MV E VELOCITY: 91 CM/S
ECHO MV E/A RATIO: 0.89
ECHO MV E/E' LATERAL: 11.38
ECHO MV E/E' RATIO (AVERAGED): 9.82
ECHO MV E/E' SEPTAL: 8.27
ECHO RA AREA 4C: 14.81 CM2
ECHO RA MINOR AXIS: 4 CM
ECHO RA VOLUME: 44 ML
ECHO RV INTERNAL DIMENSION: 4.6 CM
ECHO TRICUSPID ANNULAR PEAK SYSTOLIC VELOCITY: 1.7 CM/S
ECHO TV REGURGITANT MAX VELOCITY: 308 CM/S
ECHO TV REGURGITANT PEAK GRADIENT: 38 MMHG
EOSINOPHIL # BLD: 0.3 K/UL (ref 0–0.4)
EOSINOPHIL NFR BLD: 1 % (ref 0–5)
ERYTHROCYTE [DISTWIDTH] IN BLOOD BY AUTOMATED COUNT: 17.9 % (ref 11.6–14.5)
GLUCOSE SERPL-MCNC: 77 MG/DL (ref 74–99)
HCT VFR BLD AUTO: 27.7 % (ref 35–45)
HGB BLD-MCNC: 8.7 G/DL (ref 12–16)
LYMPHOCYTES # BLD: 1.5 K/UL (ref 0.9–3.6)
LYMPHOCYTES NFR BLD: 8 % (ref 21–52)
MAGNESIUM SERPL-MCNC: 2.1 MG/DL (ref 1.6–2.6)
MAGNESIUM SERPL-MCNC: 2.3 MG/DL (ref 1.6–2.6)
MCH RBC QN AUTO: 27.3 PG (ref 24–34)
MCHC RBC AUTO-ENTMCNC: 31.4 G/DL (ref 31–37)
MCV RBC AUTO: 86.8 FL (ref 74–97)
MONOCYTES # BLD: 1.3 K/UL (ref 0.05–1.2)
MONOCYTES NFR BLD: 7 % (ref 3–10)
NEUTS SEG # BLD: 16.1 K/UL (ref 1.8–8)
NEUTS SEG NFR BLD: 84 % (ref 40–73)
PHOSPHATE SERPL-MCNC: 2.3 MG/DL (ref 2.5–4.9)
PLATELET # BLD AUTO: 205 K/UL (ref 135–420)
PMV BLD AUTO: 9.5 FL (ref 9.2–11.8)
POTASSIUM SERPL-SCNC: 3.5 MMOL/L (ref 3.5–5.5)
RBC # BLD AUTO: 3.19 M/UL (ref 4.2–5.3)
SERVICE CMNT-IMP: NORMAL
SODIUM SERPL-SCNC: 144 MMOL/L (ref 136–145)
WBC # BLD AUTO: 19.1 K/UL (ref 4.6–13.2)

## 2019-10-10 PROCEDURE — 74011250636 HC RX REV CODE- 250/636: Performed by: INTERNAL MEDICINE

## 2019-10-10 PROCEDURE — 74011250637 HC RX REV CODE- 250/637: Performed by: INTERNAL MEDICINE

## 2019-10-10 PROCEDURE — 77010033678 HC OXYGEN DAILY

## 2019-10-10 PROCEDURE — 85025 COMPLETE CBC W/AUTO DIFF WBC: CPT

## 2019-10-10 PROCEDURE — 74011250637 HC RX REV CODE- 250/637: Performed by: UROLOGY

## 2019-10-10 PROCEDURE — P9047 ALBUMIN (HUMAN), 25%, 50ML: HCPCS | Performed by: INTERNAL MEDICINE

## 2019-10-10 PROCEDURE — 97162 PT EVAL MOD COMPLEX 30 MIN: CPT

## 2019-10-10 PROCEDURE — 3331090001 HH PPS REVENUE CREDIT

## 2019-10-10 PROCEDURE — 84100 ASSAY OF PHOSPHORUS: CPT

## 2019-10-10 PROCEDURE — C9113 INJ PANTOPRAZOLE SODIUM, VIA: HCPCS | Performed by: INTERNAL MEDICINE

## 2019-10-10 PROCEDURE — 97116 GAIT TRAINING THERAPY: CPT

## 2019-10-10 PROCEDURE — 74011250637 HC RX REV CODE- 250/637: Performed by: HOSPITALIST

## 2019-10-10 PROCEDURE — 74011000250 HC RX REV CODE- 250: Performed by: UROLOGY

## 2019-10-10 PROCEDURE — 74011000250 HC RX REV CODE- 250: Performed by: INTERNAL MEDICINE

## 2019-10-10 PROCEDURE — 80048 BASIC METABOLIC PNL TOTAL CA: CPT

## 2019-10-10 PROCEDURE — 3331090002 HH PPS REVENUE DEBIT

## 2019-10-10 PROCEDURE — 65660000000 HC RM CCU STEPDOWN

## 2019-10-10 PROCEDURE — 36415 COLL VENOUS BLD VENIPUNCTURE: CPT

## 2019-10-10 PROCEDURE — 74011250636 HC RX REV CODE- 250/636: Performed by: UROLOGY

## 2019-10-10 PROCEDURE — 83735 ASSAY OF MAGNESIUM: CPT

## 2019-10-10 PROCEDURE — 93306 TTE W/DOPPLER COMPLETE: CPT

## 2019-10-10 RX ORDER — POTASSIUM CHLORIDE 20 MEQ/1
40 TABLET, EXTENDED RELEASE ORAL ONCE
Status: COMPLETED | OUTPATIENT
Start: 2019-10-10 | End: 2019-10-10

## 2019-10-10 RX ORDER — SODIUM,POTASSIUM PHOSPHATES 280-250MG
2 POWDER IN PACKET (EA) ORAL
Status: COMPLETED | OUTPATIENT
Start: 2019-10-10 | End: 2019-10-10

## 2019-10-10 RX ADMIN — DULOXETINE 60 MG: 60 CAPSULE, DELAYED RELEASE ORAL at 09:25

## 2019-10-10 RX ADMIN — PANCRELIPASE LIPASE, PANCRELIPASE PROTEASE, PANCRELIPASE AMYLASE 1 CAPSULE: 10000; 32000; 42000 CAPSULE, DELAYED RELEASE ORAL at 17:20

## 2019-10-10 RX ADMIN — Medication 10 ML: at 06:00

## 2019-10-10 RX ADMIN — NYSTATIN AND TRIAMCINOLONE ACETONIDE: 100000; 1 CREAM TOPICAL at 21:30

## 2019-10-10 RX ADMIN — POTASSIUM CHLORIDE 40 MEQ: 20 TABLET, EXTENDED RELEASE ORAL at 13:17

## 2019-10-10 RX ADMIN — PANCRELIPASE LIPASE, PANCRELIPASE PROTEASE, PANCRELIPASE AMYLASE 1 CAPSULE: 10000; 32000; 42000 CAPSULE, DELAYED RELEASE ORAL at 11:56

## 2019-10-10 RX ADMIN — CEFTRIAXONE 1 G: 1 INJECTION, POWDER, FOR SOLUTION INTRAMUSCULAR; INTRAVENOUS at 17:19

## 2019-10-10 RX ADMIN — POTASSIUM & SODIUM PHOSPHATES POWDER PACK 280-160-250 MG 2 PACKET: 280-160-250 PACK at 11:56

## 2019-10-10 RX ADMIN — OXYCODONE HYDROCHLORIDE AND ACETAMINOPHEN 1 TABLET: 5; 325 TABLET ORAL at 21:26

## 2019-10-10 RX ADMIN — ALBUMIN (HUMAN) 12.5 G: 0.25 INJECTION, SOLUTION INTRAVENOUS at 17:18

## 2019-10-10 RX ADMIN — BUPROPION HYDROCHLORIDE 300 MG: 150 TABLET, EXTENDED RELEASE ORAL at 06:08

## 2019-10-10 RX ADMIN — SODIUM CHLORIDE 75 ML/HR: 900 INJECTION, SOLUTION INTRAVENOUS at 13:54

## 2019-10-10 RX ADMIN — Medication 10 ML: at 21:33

## 2019-10-10 RX ADMIN — MONTELUKAST 10 MG: 10 TABLET, FILM COATED ORAL at 21:30

## 2019-10-10 RX ADMIN — Medication 10 ML: at 14:04

## 2019-10-10 RX ADMIN — NYSTATIN AND TRIAMCINOLONE ACETONIDE: 100000; 1 CREAM TOPICAL at 11:58

## 2019-10-10 RX ADMIN — LEVOTHYROXINE SODIUM 87.5 MCG: 25 TABLET ORAL at 06:07

## 2019-10-10 RX ADMIN — PANTOPRAZOLE SODIUM 40 MG: 40 INJECTION, POWDER, FOR SOLUTION INTRAVENOUS at 11:56

## 2019-10-10 RX ADMIN — NYSTATIN AND TRIAMCINOLONE ACETONIDE: 100000; 1 CREAM TOPICAL at 17:21

## 2019-10-10 RX ADMIN — UMECLIDINIUM 1 PUFF: 62.5 AEROSOL, POWDER ORAL at 09:25

## 2019-10-10 RX ADMIN — SODIUM CHLORIDE 125 ML/HR: 900 INJECTION, SOLUTION INTRAVENOUS at 04:48

## 2019-10-10 RX ADMIN — MULTIPLE VITAMINS W/ MINERALS TAB 1 TABLET: TAB at 09:25

## 2019-10-10 RX ADMIN — POTASSIUM & SODIUM PHOSPHATES POWDER PACK 280-160-250 MG 2 PACKET: 280-160-250 PACK at 09:25

## 2019-10-10 RX ADMIN — GUAIFENESIN 1200 MG: 600 TABLET, EXTENDED RELEASE ORAL at 21:28

## 2019-10-10 RX ADMIN — ALBUMIN (HUMAN) 12.5 G: 0.25 INJECTION, SOLUTION INTRAVENOUS at 04:44

## 2019-10-10 RX ADMIN — ALBUMIN (HUMAN) 12.5 G: 0.25 INJECTION, SOLUTION INTRAVENOUS at 11:55

## 2019-10-10 RX ADMIN — PANCRELIPASE LIPASE, PANCRELIPASE PROTEASE, PANCRELIPASE AMYLASE 1 CAPSULE: 10000; 32000; 42000 CAPSULE, DELAYED RELEASE ORAL at 09:25

## 2019-10-10 NOTE — PROGRESS NOTES
[facility]  Pulmonary, Critical Care, and Sleep Medicine    Pulm/CC Note    Name: Sherwin Collazo MRN: [id number]   : 1950 Hospital: [facility] [place]   Date: 10/10/2019  Room: 109/     Subjective: This patient has been seen and evaluated at the request of Dr.D DIEHL [facility] for patient with UTI and sepsis. Patient is a 71 y.o. 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 Patient was admitted in 2019 UTIs and kidney stones. She was discharged and then underwent cystoscopy and RT ureteral stent placement 19. Yesterday, she came for elective cystoscopy, right ureteroscopy with holmium laser lithotripsy and stent change. Postop she developed tachycardia, sepsis and moved to icu. She was hypotensive overnight despite fluids and started levophed. 10/10/19  Patient awake, alert this am.  She is off levophed since yesterday evening. No cough or SOB or CP.   Afebrile; BP stable  UOP 2 L yesterday    Past Medical History:   Diagnosis Date    Asthma     Depression     Emphysema of lung (HCC)     Gastroparesis     Hypothyroidism     Malabsorption     of fat    Pancreatic insufficiency     Restless leg     bilateral      Past Surgical History:   Procedure Laterality Date    ABDOMEN SURGERY PROC UNLISTED      HX HERNIA REPAIR      HX SMALL BOWEL RESECTION      intra aortic baloon pump    HX TUBAL LIGATION          No Known Allergies   Social History     Tobacco Use    Smoking status: Former Smoker    Smokeless tobacco: Never Used   Substance Use Topics    Alcohol use: Never     Frequency: Never           Current Facility-Administered Medications   Medication Dose Route Frequency    potassium, sodium phosphates (NEUTRA-PHOS) packet 2 Packet  2 Packet Oral Q2H    levoFLOXacin (LEVAQUIN) 500 mg in D5W IVPB  500 mg IntraVENous Q24H    NOREPINephrine (LEVOPHED) 8 mg in 0.9% NS 250ml infusion  0.5-16 mcg/min IntraVENous TITRATE    albumin human 25% (BUMINATE) solution 12.5 g  12.5 g IntraVENous Q6H    heparin (porcine) injection 5,000 Units  5,000 Units SubCUTAneous Q8H    pantoprazole (PROTONIX) 40 mg in sodium chloride 0.9% 10 mL injection  40 mg IntraVENous Q24H    cefTRIAXone (ROCEPHIN) 1 g in sterile water (preservative free) 10 mL IV syringe  1 g IntraVENous Q24H    buPROPion XL (WELLBUTRIN XL) tablet 300 mg  300 mg Oral 7am    DULoxetine (CYMBALTA) capsule 60 mg  60 mg Oral DAILY    levothyroxine (SYNTHROID) tablet 87.5 mcg  87.5 mcg Oral ACB    montelukast (SINGULAIR) tablet 10 mg  10 mg Oral DAILY    multivitamin, tx-iron-ca-min (THERA-M w/ IRON) tablet 1 Tab  1 Tab Oral DAILY    nystatin-triamcinolone (MYCOLOG II) 100,000-0.1 unit/g-% cream   Topical TID    umeclidinium (INCRUSE ELLIPTA) 62.5 mcg/actuation  1 Puff Inhalation DAILY    sodium chloride (NS) flush 5-40 mL  5-40 mL IntraVENous Q8H    0.9% sodium chloride infusion  125 mL/hr IntraVENous CONTINUOUS    lipase-protease-amylase (ZENPEP 10,000) capsule 1 Cap  1 Cap Oral TID WITH MEALS       Latest lactic acid:   Lactic acid   Date Value Ref Range Status   10/09/2019 1.7 0.4 - 2.0 MMOL/L Final   10/09/2019 2.9 (HH) 0.4 - 2.0 MMOL/L Final     Comment:     CALLED TO AND CORRECTLY REPEATED BY:  ARIANA PAUL RN ICU TO [facility] AT 0684 ON [id number]     10/09/2019 2.6 (HH) 0.4 - 2.0 MMOL/L Final     Comment:     CALLED TO AND CORRECTLY REPEATED BY:  ARIANA PAUL TO DS AT 1120       Review of Systems:  Ears, nose, mouth, throat, and face: No epistaxis, no difficulty in swallowing  Respiratory: as above  Cardiovascular: no chest pain or palpitations  Gastrointestinal: no abd pain, vomitting or diarrhea; + chronic malnutrition state  Genitourinary: as above  Neurological: No focal weakness or seizures   Constitutional: No fever or chills; + chronic weight loss       Objective:   Vital Signs:    Visit Vitals  /88   Pulse 77   Temp 98.1 °F (36.7 °C)   Resp 21   Ht 4' 11\" (1.499 m)   Wt 41.3 kg (91 lb)   SpO2 99%   Breastfeeding? No   BMI 18.38 kg/m²       O2 Device: Nasal cannula   O2 Flow Rate (L/min): 2 l/min   Temp (24hrs), Av.2 °F (36.8 °C), Min:97.8 °F (36.6 °C), Max:98.3 °F (36.8 °C)       Intake/Output:   Last shift:      10/10 0701 - 10/10 1900  In: 120 [P.O.:120]  Out: -   Last 3 shifts: 10/08 1901 - 10/10 0700  In: 3623.3 [P.O.:240;  I.V.:3383.3]  Out: 2600 [Urine:2600]    Intake/Output Summary (Last 24 hours) at 10/10/2019 1111  Last data filed at 10/10/2019 0715  Gross per 24 hour   Intake 2178.25 ml   Output 2000 ml   Net 178.25 ml         Physical Exam:   Comfortable; appears older than stated age; thin and frail; on 2 L nc o2; acyanotic  HEENT: pupils not dilated, no scleral jaundice, moist oral mucosa  Neck: No adenopathy or thyroid swelling  CVS: S1S2 no murmurs; JVD not elevated  RS: Mod air entry bilaterally, symmetrical BS; no wheezes or crackles; not tachypneic  Abd: soft, non tender, no hepatosplenomegaly, no abd distension, no guarding or rigidity, bowel sounds heard; abd wall dressing noted  Neuro: awake, alert, moving all extremities   Extrm: no leg edema or swelling or clubbing  Skin: no rash  Lymphatic: no cervical or supraclavicular adenopathy    Telemetry:     Data review:     Recent Results (from the past 24 hour(s))   CALCIUM, IONIZED    Collection Time: 10/09/19  2:16 PM   Result Value Ref Range    Ionized Calcium 1.08 (L) 1.12 - 1.32 MMOL/L   LACTIC ACID    Collection Time: 10/09/19  2:16 PM   Result Value Ref Range    Lactic acid 2.9 (HH) 0.4 - 2.0 MMOL/L   POTASSIUM    Collection Time: 10/09/19  2:17 PM   Result Value Ref Range    Potassium 3.9 3.5 - 5.5 mmol/L   MAGNESIUM    Collection Time: 10/09/19  2:17 PM   Result Value Ref Range    Magnesium 1.3 (L) 1.6 - 2.6 mg/dL   PHOSPHORUS    Collection Time: 10/09/19 2:17 PM   Result Value Ref Range    Phosphorus 3.4 2.5 - 4.9 MG/DL   LACTIC ACID    Collection Time: 10/09/19  6:36 PM   Result Value Ref Range    Lactic acid 1.7 0.4 - 2.0 MMOL/L   POTASSIUM    Collection Time: 10/09/19  9:15 PM   Result Value Ref Range    Potassium 4.1 3.5 - 5.5 mmol/L   CALCIUM, IONIZED    Collection Time: 10/09/19  9:15 PM   Result Value Ref Range    Ionized Calcium 1.27 1.12 - 1.32 MMOL/L   MAGNESIUM    Collection Time: 10/09/19 11:40 PM   Result Value Ref Range    Magnesium 2.3 1.6 - 2.6 mg/dL   PHOSPHORUS    Collection Time: 10/10/19  4:15 AM   Result Value Ref Range    Phosphorus 2.3 (L) 2.5 - 4.9 MG/DL   MAGNESIUM    Collection Time: 10/10/19  4:15 AM   Result Value Ref Range    Magnesium 2.1 1.6 - 2.6 mg/dL   ECHO ADULT COMPLETE    Collection Time: 10/10/19 10:05 AM   Result Value Ref Range    Right Atrial Area 4C 14.81 cm2    Ao Root D 2.89 cm    LVIDd 3.50 3.9 - 5.3 cm    LVPWd 1.00 0.6 - 0.9 cm    LVIDs 2.60 cm    IVSd 1.00 0.6 - 0.9 cm    LV ES Vol BP 25.0 19 - 49 mL    LVOT d 2.9 cm    LV E' Septal Velocity 11.00 cm/s    LV E' Lateral Velocity 8.00 cm/s    MV A Alok 102.00 cm/s    MV E Alok 91.00 cm/s    MV E/A 0.89     BP EF 53.0 55 - 100 %    E/E' lateral 11.38     E/E' septal 8.27     E/E' ratio (averaged) 9.82     LVES Vol Index BP 18.9 mL/m2    LVED Vol Index BP 39.4 mL/m2    Mitral Valve E Wave Deceleration Time 153.0 ms    Left Atrium Major Axis 3.10 cm    Triscuspid Valve Regurgitation Peak Gradient 38.0 mmHg    LV ED Vol BP 52.0 56 - 104 ml    TR Max Velocity 308.00 cm/s    Aortic Valve Systolic Peak Velocity 827.59 cm/s    Aortic Valve Area by Continuity of VTI 2.9 cm2    AoV PG 6.3 mmHg    LVOT Peak Velocity 79.00 cm/s    LVOT Peak Gradient 2.5 mmHg    LVOT VTI 21.90 cm    RVIDd 4.6 cm    Aortic Valve Systolic Mean Gradient 4.0 mmHg    TAPSV 1.7 cm/s    IVC proximal 1.70 cm    LV ED Vol A2C 46.0 mL    LV ES Vol A4C 28.0 mL    LV Ejection Fraction MOD 4C 54.0 %    LV Ejection Fraction MOD 2C 36.0 %    LA Vol 4C 30.6 22 - 52 mL    LA Vol 2C 34.0 22 - 52 mL    LA Area 4C 2.7 cm2    LV ES Vol A2C 29.0 mL    LV ED Vol A4C 52.0 mL    Right Atrium Minor Axis 4.00 cm    Right Atrial Volume 44.0 ml    LA Vol Index 25.77 16 - 28 ml/m2    LA Vol Index 23.19 16 - 28 ml/m2    LVED Vol Index A4C 39.4 mL/m2    LVED Vol Index A2C 34.9 mL/m2    LVES Vol Index A4C 21.2 mL/m2    LVES Vol Index A2C 22.0 mL/m2           No results for input(s): FIO2I, IFO2, HCO3I, IHCO3, HCOPOC, PCO2I, PCOPOC, IPHI, PHI, PHPOC, PO2I, PO2POC in the last 72 hours. No lab exists for component: IPOC2    All Micro Results     Procedure Component Value Units Date/Time    CULTURE, URINE [[id number]] Collected:  10/08/19 1800    Order Status:  Completed Specimen:  Cath Urine Updated:  10/10/19 1047     Special Requests: NO SPECIAL REQUESTS        Culture result: NO GROWTH 2 DAYS       CULTURE, BLOOD [[id number]] Collected:  10/08/19 1734    Order Status:  Completed Specimen:  Blood Updated:  10/10/19 0723     Special Requests: NO SPECIAL REQUESTS        Culture result: NO GROWTH 2 DAYS             Imaging:  [x]I have personally reviewed the patients chest radiographs images and report     CXR 10/9  Results from Hospital Encounter encounter on 10/08/19   XR CHEST PORT    Narrative A portable AP radiograph of the chest was obtained at 11:15 hours:  INDICATION:  COPD. COMPARISON:  Multiple prior studies most recent being 9/25/2019. FINDINGS:      Heart and mediastinum: Unremarkable. Lungs and pleura: Clear without consolidation or pleural effusion. Lungs are  hyperinflated. Aorta: Partially calcified and mildly tortuous. Bones: Within normal limits for age. Other: Postop changes noted right shoulder. Impression Impression:    Hyperinflated lungs may be due to underlying emphysema. No new abnormality is  seen developing since last study.           Results from [place] encounter on 09/25/19   CT ABD PELV W CONT Narrative EXAM: CT of the abdomen and pelvis    INDICATION: Abdominal pain, flank pain. Right-sided abdominal pain. COMPARISON: 8/31/2019, 4/28/2019    TECHNIQUE: Axial CT imaging of the abdomen and pelvis was performed with  intravenous contrast. Multiplanar reformats were generated. One or more dose  reduction techniques were used on this CT: automated exposure control,  adjustment of the mAs and/or kVp according to patient size, and iterative  reconstruction techniques. The specific techniques used on this CT exam have  been documented in the patient's electronic medical record. Digital Imaging and  Communications in the Medicine (DICOM) format image data are available to  nonaffiliated external healthcare facilities or entities on a secure, media  free, reciprocally searchable basis with patient authorization for at least a 12  month period after this study. _______________    FINDINGS:    LOWER CHEST: Emphysematous changes. Regions of basilar scarring-atelectasis. LIVER, BILIARY: Liver is normal. Gallbladder is moderately distended but  thin-walled. There is nonspecific dilatation of the common bile duct, 9 mm in  diameter. PANCREAS: Normal.    SPLEEN: Normal.    ADRENALS: Normal.    KIDNEYS: There is severe right perirenal-retroperitoneal inflammatory stranding  with marked urothelial enhancement of the right renal pelvis and proximal ureter  with an associated nonobstructing calculus within the left renal pelvis which  measures approximately 8 mm in diameter. Kidneys enhance  symmetrically-homogenously. LYMPH NODES: No enlarged lymph nodes. GASTROINTESTINAL TRACT: No bowel obstruction or acute appearing wall thickening. Previous surgical anastomosis involving the stomach and bowel noted. PELVIC ORGANS: Stable right pelvic ovoid cyst, 42 x 20 mm. Caesar Ed VASCULATURE: Unremarkable. BONES: Interval but remote appearing superior endplate compression deformity at  L4.  No acute or aggressive osseous abnormalities identified. OTHER: None.    _______________      Impression IMPRESSION:    1. There is severe right perirenal-retroperitoneal inflammatory stranding with  marked urothelial enhancement of the right renal pelvis and proximal ureter with  an associated nonobstructing calculus within the left renal pelvis which  measures approximately 8 mm in diameter. Findings suggest severe pyelitis. 2. Gallbladder is moderately distended but thin-walled. There is nonspecific  dilatation of the common bile duct, 9 mm in diameter. IMPRESSION:   · Sepsis  · UTI  · Septic shock  · Renal stone   · Malnutrition     Patient Active Problem List   Diagnosis Code    Enterocutaneous fistula K63.2    Ureterolithiasis N20.1    Renal stone N20.0    Sepsis ([address] A41.9    Septic shock ([address] A41.9, R65.21    Malnutrition ([address] E46 ·   COPD J44.9  ·       RECOMMENDATIONS:   · Pulm: stable respirations; continue incruse inhaler daily; prn duoneb; nc o2; wean fio2 for o2 sats >91%  · Cardiac: off levophed; stable BP; albumin q6 to complete 2 days; Echo done - results pending  · ID: blood and urine cx neg so far; continue ceftriaxone; complete 5 days antibiotic; prior urine cx showed klebsiella-pansensitive  · Renal: watch IOs and renal fn; check BMP today  · GI: oral diet as tolerated; risks for refeeding syndrome; check mag and phos daily; oral neutraphos give this am   · Neuro: stable   · Hem: watch Hb and plts; check CBC today  · Fluids: NS maintenance 75 /hr  · Nutrition: oral diet  · Proph:  DVt and GI proph-sc heparin and PPI  · D/w patient and updated medical management  · Ok for transfer to tele early  Will defer respective systems problem management to primary and other consultant and follow patient in ICU with primary and other medical team  Further recommendations will be based on the patient's response to recommended treatment and results of the investigation ordered.   Quality Care: PPI, DVT prophylaxis, HOB elevated, Infection control all reviewed and addressed. · Lines/Tubes: PIVs; rivera cath 10/9  ADVANCE DIRECTIVE: DNR status; noted [place] DNR document in chart; palliative care consulted and [facility] document done.       High complexity decision making was performed in this consultation and evaluation of this patient          Jeremy Camejo MD

## 2019-10-10 NOTE — PROGRESS NOTES
Problem: Falls - Risk of  Goal: *Absence of Falls  Description  Document [facility] Fall Risk and appropriate interventions in the flowsheet.   Outcome: Progressing Towards Goal  Note:   Fall Risk Interventions:  Mobility Interventions: Patient to call before getting OOB         Medication Interventions: Patient to call before getting OOB    Elimination Interventions: Call light in reach

## 2019-10-10 NOTE — PROGRESS NOTES
Hospitalist Progress Note-critical care note     Patient: Dony Cosme MRN: [id number]  CSN: [id number]    YOB: 1950  Age: 71 y.o. Sex: female    DOA: 10/8/2019 LOS:  LOS: 2 days            Chief complaint: septic shock, sepsis. Renal stone, pyelonephritis     Assessment/Plan         Hospital Problems  Date Reviewed: 10/8/2019          Codes Class Noted POA    Septic shock ([facility] ICD-10-CM: A41.9, R65.21  ICD-9-CM: 038.9, 785.52, 995.92  10/9/2019 Unknown        Malnutrition ([facility] ICD-10-CM: E46  ICD-9-CM: 263.9  10/9/2019 Unknown        Sepsis ([facility] ICD-10-CM: A41.9  ICD-9-CM: 038.9, 995.91  10/8/2019 Unknown        * (Principal) Renal stone ICD-10-CM: N20.0  ICD-9-CM: 592.0  10/7/2019 Yes        Pyelonephritis ICD-10-CM: N12  ICD-9-CM: 590.80  8/31/2019 Unknown              Septic shock   Resolved, off levophed yesterday afternoon     Pyelonephritis   Continue Levaquin and rocephin    f/u so far negative     Renal stone  Cystoscopy, right retrograde pyelogram with interpretation, right ureteroscopy with holmium laser lithotripsy and stent change  Managed per primary team     Subjective: feel better today, more strength   rn : off levophed yesterday        Will have pt/ot , transfer out of icu   Review of systems:    General: No fevers or chills. Tired   Cardiovascular: No chest pain or pressure. No palpitations. Pulmonary: No shortness of breath. Gastrointestinal: No nausea, vomiting. Vital signs/Intake and Output:  Visit Vitals  /80   Pulse 90   Temp 98.1 °F (36.7 °C)   Resp 29   Ht 4' 11\" (1.499 m)   Wt 41.3 kg (91 lb)   SpO2 92%   Breastfeeding? No   BMI 18.38 kg/m²     Current Shift:  10/10 0701 - 10/10 1900  In: 120 [P.O.:120]  Out: -   Last three shifts:  10/08 1901 - 10/10 0700  In: 3623.3 [P.O.:240; I.V.:3383.3]  Out: 2600 [Urine:2600]    Physical Exam:  General: WD, WN. Alert, cooperative, no acute distress    HEENT: NC, Atraumatic.   PERRLA, anicteric sclerae. Lungs: CTA Bilaterally. No Wheezing/Rhonchi/Rales. Heart:  Regular  rhythm,  No murmur, No Rubs, No Gallops  Abdomen: Soft, Non distended, Non tender.  +Bowel sounds,   Extremities: No c/c/e  Psych:   Not anxious or agitated. Neurologic:  No acute neurological deficit. Labs: Results:       Chemistry Recent Labs     10/10/19  0415 10/09/19  2115 10/09/19  1417 10/09/19  0204   GLU 77  --   --  86     --   --  140   K 3.5 4.1 3.9 3.8   *  --   --  109   CO2 18*  --   --  23   BUN 11  --   --  11   CREA 0.55*  --   --  0.72   CA 8.0*  --   --  7.5*   AGAP 8  --   --  8   BUCR 20  --   --  15   AP  --   --   --  221*   TP  --   --   --  4.6*   ALB  --   --   --  1.8*   GLOB  --   --   --  2.8   AGRAT  --   --   --  0.6*      CBC w/Diff Recent Labs     10/10/19  0415 10/09/19  0204 10/08/19  1734   WBC 19.1* 14.9* 1.1*   RBC 3.19* 3.59* 4.00*   HGB 8.7* 9.8* 10.9*   HCT 27.7* 31.1* 34.7*    258 342   GRANS 84* 49 40*   LYMPH 8* 4* 34   EOS 1 0 2      Cardiac Enzymes No results for input(s): CPK, CKND1, JOSE in the last 72 hours. No lab exists for component: CKRMB, TROIP   Coagulation No results for input(s): PTP, INR, APTT, INREXT, INREXT in the last 72 hours. Lipid Panel No results found for: CHOL, CHOLPOCT, CHOLX, CHLST, CHOLV, 294413, HDL, HDLP, LDL, LDLC, DLDLP, 465274, VLDLC, VLDL, TGLX, TRIGL, TRIGP, TGLPOCT, CHHD, CHHDX   BNP No results for input(s): BNPP in the last 72 hours.    Liver Enzymes Recent Labs     10/09/19  0204   TP 4.6*   ALB 1.8*   *   SGOT 222*      Thyroid Studies No results found for: T4, T3U, TSH, TSHEXT, TSHEXT     Procedures/imaging: see electronic medical records for all procedures/Xrays and details which were not copied into this note but were reviewed prior to creation of Mitch Davis MD

## 2019-10-10 NOTE — PROGRESS NOTES
1323 TRANSFER - IN REPORT:    Verbal report received from Júnior Handy RN(name) on [employer]  being received from ICU (unit) for routine progression of care      Report consisted of patients Situation, Background, Assessment and   Recommendations(SBAR). Information from the following report(s) SBAR, Kardex, [facility] and Cardiac Rhythm NSR was reviewed with the receiving nurse. Opportunity for questions and clarification was provided. Assessment completed upon patients arrival to unit and care assumed. 1350 Pt arrived on the unit. Care assumed. 1900 Patient had an uneventful shift and remained stable. Purposeful hourly rounding completed throughout the shift, NAD noted at this time, and patient is resting quietly in bed. No concerns or requests voiced    1915 Bedside and Verbal shift change report given to Francisco Márquez RN (oncoming nurse) by Kamille Alexandre RN (offgoing nurse). Report included the following information SBAR, Kardex, MAR, Recent Results and Cardiac Rhythm .

## 2019-10-10 NOTE — PROGRESS NOTES
Bedside and Verbal shift change report received from CAITLYN Mackay RN (offgoing nurse). Report included the following information SBAR, Kardex, Intake/Output and MAR.  
 
1930 Shift assessment completed, see EMR. Patient on RA , alert and orient x4, eyes round reactive to light at 3 mm. Lung sound clear diminish on auscultation. Patient has active bowel sound. Patient has mid abdominal fistula. Patient is dressing fistula site as needed. 2330 Reassessment completed, see EMR 
 
0025 Bedside and Verbal report given to JUAN ANTONIO Abraham RN (oncoming nurse)Report included the following information SBAR, Kardex, Intake/Output, MAR and Recent Results.

## 2019-10-10 NOTE — PROGRESS NOTES
DC Plan: TBD, pending therapy    Chart reviewed. Pt admitted to [facility]. Attended morning IDRs, plan is for patient to transfer to tele. Met with pt at bedside. Pt lives with roommates. Pts daughter nearby. Daughter will drive home at discharge. Pt is active with [facility] and would like to cont with them at dc if needed. Did discuss possible need for rehab. Pt agrees if recommended by MD and PT. FOC offered, but pt uncertain where she would like to go. Will place local referrals for continuity of care. PT eval orders noted. Pt has rollator. Pts PCP is Chester Campbell. Pt states she also sees specialist @ [facility]. State [facility] sees [facility]. Denies any inpt psych admits in last 2 years. No immediate dc concerns identified. CM will cont to follow, [phone].

## 2019-10-10 NOTE — PROGRESS NOTES
Problem: Mobility Impaired (Adult and Pediatric)  Goal: *Acute Goals and Plan of Care (Insert Text)  Description  Physical Therapy Goals   Initiated 10/10/2019 and to be accomplished within 5 day(s)  1. Patient will move from supine <> sit with mod I in prep for out of bed activity and change of position. 2.  Patient will perform sit<> stand with RW/mod I in prep for transfers/ambulation. 3.  Patient will transfer from bed <> chair with mod I/RW for time up in chair for completion of ADL activity. 4.  Patient will ambulate 100+ feet with mod I/RW for improved functional mobility/safe discharge. 5.  Patient will ascend/descend flight of stairs with handrail(s) with mod I/S for home re-entry as needed. Outcome: Progressing Towards Goal    PHYSICAL THERAPY EVALUATION    Patient: Mikey Lockwood (89 y.o. female)  Date: 10/10/2019  Primary Diagnosis: Sepsis ([facility] [address] [A41.9]  Pyelonephritis [N12]  Procedure(s) (LRB):  CYSTOSCOPY, RIGHT RETROGRADE PYELOGRAM WITH INTERPRETATION, RIGHT URETEROSCOPY LASER LITHOTRIPSY WITH HOLMIUM, STENT CHANGE WITH C-ARM, \"SPEC POP\" (Right) 2 Days Post-Op   Precautions: Fall    ASSESSMENT :  Based on the objective data described below, the patient seen on  unit and presents with LE weakness, limitations in functional mobility with regard to bed mobility and transfers, gait quality and decreased activity tolerance. Pt reports amb with rollator, receiving HHPT/OT and lives with roommates. Reports pain 3-4/10 during session (chronic LBP). Pt performed supine >sit with supervision/SBA, transfers sit <> stand with CGA and participated in GT/RW/28ft to/from bathroom. Alyssa decreased with decreased foot clearance. O2 sat 94% RA pre and initially 85-87% post session. Increased to 97% within 1 min seated rest.  Pt left up in chair at bedside with all needs in reach, and nurse Barbosa notified. Recommend HHPT resume for follow up physical therapy upon discharge.      Pt Education: Role of physical therapy in acute care setting, fall prevention and safety/technique during functional mobility tasks      Patient will benefit from skilled intervention to address the above impairments. Patients rehabilitation potential is considered to be Good  Factors which may influence rehabilitation potential include:   ? None noted  ? Mental ability/status  ? Medical condition  ? Home/family situation and support systems  ? Safety awareness  ? Pain tolerance/management  ? Other:      PLAN :  Recommendations and Planned Interventions:  ?           Bed Mobility Training             ? Neuromuscular Re-Education  ? Transfer Training                   ? Orthotic/Prosthetic Training  ? Gait Training                          ? Modalities  ? Therapeutic Exercises          ? Edema Management/Control  ? Therapeutic Activities            ? Patient and Family Training/Education  ? Other (comment):    Frequency/Duration: Patient will be followed by physical therapy 1-2 times per day to address goals. Discharge Recommendations: Home Health (was continuing to receive HHPT and HHOT  Further Equipment Recommendations for Discharge: N/A     SUBJECTIVE:   Patient stated \"Tired, not too good.     OBJECTIVE DATA SUMMARY:     Past Medical History:   Diagnosis Date    Asthma     Depression     Emphysema of lung ([address]     Gastroparesis     Hypothyroidism     Malabsorption     of fat    Pancreatic insufficiency     Restless leg     bilateral     Past Surgical History:   Procedure Laterality Date    ABDOMEN SURGERY PROC UNLISTED      HX HERNIA REPAIR      HX SMALL BOWEL RESECTION      intra aortic baloon pump    HX TUBAL LIGATION       Barriers to Learning/Limitations: yes;  physical  Compensate with: Verbal Cues  Prior Level of Function/Home Situation: as noted above  Home Situation  Home Environment: Private residence  # Steps to Enter: 1(threshold)  One/Two Story Residence: Two story  # of Interior Steps: 15  Interior Rails: Both  Lift Chair Available: Yes  Living Alone: No  Support Systems: Other (comments)(roommates)  Patient Expects to be Discharged to[de-identified] Private residence  Current DME Used/Available at Home: Walker, rollator, [address] chair, Commode, bedside  Critical Behavior:  Neurologic State: Alert; Appropriate for age  Orientation Level: Oriented X4  Psychosocial  Patient Behaviors: Calm; Cooperative  Purposeful Interaction: Yes  Pt Identified Daily Priority: Clinical issues (comment)  Caritas Process: Nurture loving kindness; Attend basic human needs  Caring Interventions: Reassure  Reassure: Therapeutic listening;Caring rounds  Therapeutic Modalities: Intentional therapeutic touch  Skin Condition/Temp: Dry;Warm  Skin Integrity: Wound (add Wound LDA)(abdominal fistula)  Skin Integumentary  Skin Color: Appropriate for ethnicity  Skin Condition/Temp: Dry;Warm  Skin Integrity: Wound (add Wound LDA)(abdominal fistula)  Turgor: Epidermis thin w/ loss of subcut tissue  Hair Growth: Present  Varicosities: Absent  Strength:    Strength: Generally decreased, functional  Tone & Sensation:   Tone: Normal  Sensation: Intact  Range Of Motion:  AROM: Generally decreased, functional  PROM: Generally decreased, functional  Functional Mobility:  Bed Mobility:  Rolling: Supervision  Supine to Sit: Supervision;Stand-by assistance  Scooting: Supervision  Transfers:  Sit to Stand: Contact guard assistance(vc hand placement)  Stand to Sit: Contact guard assistance; Other (comment)(as aobve)  Bed to Chair: Contact guard assistance  Balance:   Sitting: Intact  Standing: Intact; With support  Ambulation/Gait Training:  Distance (ft): 28 Feet (ft)  Assistive Device: Gait belt;Walker, rolling  Ambulation - Level of Assistance: Contact guard assistance  Gait Description (WDL): Exceptions to WDL  Gait Abnormalities: Decreased step clearance  Speed/Alyssa: Pace decreased (<100 feet/min)  Interventions: Safety awareness training;Verbal cues  Pain:  Pain Scale 1: Numeric (0 - 10)  Pain Intensity 1: 3  Pain Location 1: Back  Pain Orientation 1: Lower  Pain Description 1: Sore  Activity Tolerance:   Fair   Please refer to the flowsheet for vital signs taken during this treatment. After treatment:   ?         Patient left in no apparent distress sitting up in chair  ? Patient left in no apparent distress in bed  ? Call bell left within reach  ? Nursing notified  ? Caregiver present  ? Bed alarm activated    COMMUNICATION/EDUCATION:   ?         Fall prevention education was provided and the patient/caregiver indicated understanding. ? Patient/family have participated as able in goal setting and plan of care. ?         Patient/family agree to work toward stated goals and plan of care. ?         Patient understands intent and goals of therapy, but is neutral about his/her participation. ? Patient is unable to participate in goal setting and plan of care.     Eval Complexity: History: HIGH Complexity :3+ comorbidities / personal factors will impact the outcome/ POC Exam:MEDIUM Complexity : 3 Standardized tests and measures addressing body structure, function, activity limitation and / or participation in recreation  Presentation: MEDIUM Complexity : Evolving with changing characteristics  Clinical Decision Making:Medium Complexity    Overall Complexity:MEDIUM    Thank you for this referral.  Raine Cotton, PT   Time Calculation: 23 mins

## 2019-10-10 NOTE — PROGRESS NOTES
Urology Progress Note    Patient: Jose Fletcher MRN: [id number] SSN: [id number]    YOB: 1950  Age: 71 y.o. Sex: female    DOA: 10/8/2019 LOS:  LOS: 2 days              Subjective:   Pt feeling much better, eating well, no pain or any other complaints. She is now off all pressors    Objective:      Visit Vitals  /67   Pulse 91   Temp 98 °F (36.7 °C)   Resp 22   Ht 4' 11\" (1.499 m)   Wt 41.3 kg (91 lb)   SpO2 97%   Breastfeeding? No   BMI 18.38 kg/m²     Temp (24hrs), Av.3 °F (36.8 °C), Min:98 °F (36.7 °C), Max:98.4 °F (36.9 °C)      Intake and Output:  10/08 0701 - 10/09 190  In: 5683.3 [P.O.:600; I.V.:5083.3]  Out: 2350 [Urine:2350]  10/09 190 - 10/10 0700  In: -   Out: 600 [Urine:600]    Lab/Data Reviewed:  BMP:   Lab Results   Component Value Date/Time    K 4.1 10/09/2019 09:15 PM     Lactic Acid (10/9/19): 1.7  Urine Culture: No growth 14 hrs  Blood culture: No growth 12 hrs    Medications Reviewed. Assessment/Plan:   Principal Problem:    Renal stone (10/7/2019)    Active Problems:    Pyelonephritis (2019)      Sepsis ([address] (10/8/2019)      Septic shock ([address] (10/9/2019)      Malnutrition ([address] (10/9/2019)        Status Post:  Procedure(s):  CYSTOSCOPY, RIGHT RETROGRADE PYELOGRAM WITH INTERPRETATION, RIGHT URETEROSCOPY LASER LITHOTRIPSY WITH HOLMIUM, STENT CHANGE WITH C-ARM, \"SPEC POP\"   Impression: 71 yr old female with post op sepsis improving. Plan:  1. Cont care as per ICU  2. Pt can go to floor if ok w Intensivist  3. PT consult when she is transferred to floor  4.  Cont current antibiotics until final cultures available    Deonte Mendoza MD  October 10, 2019

## 2019-10-11 ENCOUNTER — APPOINTMENT (OUTPATIENT)
Dept: GENERAL RADIOLOGY | Age: 69
DRG: 856 | End: 2019-10-11
Attending: HOSPITALIST
Payer: MEDICARE

## 2019-10-11 LAB
MAGNESIUM SERPL-MCNC: 1.8 MG/DL (ref 1.6–2.6)
PHOSPHATE SERPL-MCNC: 2 MG/DL (ref 2.5–4.9)

## 2019-10-11 PROCEDURE — 36415 COLL VENOUS BLD VENIPUNCTURE: CPT

## 2019-10-11 PROCEDURE — 83735 ASSAY OF MAGNESIUM: CPT

## 2019-10-11 PROCEDURE — 74011250637 HC RX REV CODE- 250/637: Performed by: HOSPITALIST

## 2019-10-11 PROCEDURE — 74011250637 HC RX REV CODE- 250/637: Performed by: UROLOGY

## 2019-10-11 PROCEDURE — 3331090002 HH PPS REVENUE DEBIT

## 2019-10-11 PROCEDURE — C9113 INJ PANTOPRAZOLE SODIUM, VIA: HCPCS | Performed by: INTERNAL MEDICINE

## 2019-10-11 PROCEDURE — 74011000258 HC RX REV CODE- 258: Performed by: HOSPITALIST

## 2019-10-11 PROCEDURE — 94760 N-INVAS EAR/PLS OXIMETRY 1: CPT

## 2019-10-11 PROCEDURE — 74011250637 HC RX REV CODE- 250/637: Performed by: INTERNAL MEDICINE

## 2019-10-11 PROCEDURE — 3331090001 HH PPS REVENUE CREDIT

## 2019-10-11 PROCEDURE — 74011250636 HC RX REV CODE- 250/636: Performed by: INTERNAL MEDICINE

## 2019-10-11 PROCEDURE — 74011250636 HC RX REV CODE- 250/636: Performed by: HOSPITALIST

## 2019-10-11 PROCEDURE — 77030013140 HC MSK NEB VYRM -A

## 2019-10-11 PROCEDURE — 74011250636 HC RX REV CODE- 250/636: Performed by: UROLOGY

## 2019-10-11 PROCEDURE — 74011000250 HC RX REV CODE- 250: Performed by: UROLOGY

## 2019-10-11 PROCEDURE — 84100 ASSAY OF PHOSPHORUS: CPT

## 2019-10-11 PROCEDURE — 74011000250 HC RX REV CODE- 250: Performed by: INTERNAL MEDICINE

## 2019-10-11 PROCEDURE — 94640 AIRWAY INHALATION TREATMENT: CPT

## 2019-10-11 PROCEDURE — P9047 ALBUMIN (HUMAN), 25%, 50ML: HCPCS | Performed by: INTERNAL MEDICINE

## 2019-10-11 PROCEDURE — 65660000000 HC RM CCU STEPDOWN

## 2019-10-11 PROCEDURE — 74018 RADEX ABDOMEN 1 VIEW: CPT

## 2019-10-11 RX ORDER — SODIUM,POTASSIUM PHOSPHATES 280-250MG
2 POWDER IN PACKET (EA) ORAL 2 TIMES DAILY
Status: DISPENSED | OUTPATIENT
Start: 2019-10-11 | End: 2019-10-13

## 2019-10-11 RX ORDER — KETOROLAC TROMETHAMINE 15 MG/ML
15 INJECTION, SOLUTION INTRAMUSCULAR; INTRAVENOUS ONCE
Status: COMPLETED | OUTPATIENT
Start: 2019-10-11 | End: 2019-10-11

## 2019-10-11 RX ORDER — DOCUSATE SODIUM 100 MG/1
100 CAPSULE, LIQUID FILLED ORAL 2 TIMES DAILY
Status: DISCONTINUED | OUTPATIENT
Start: 2019-10-11 | End: 2019-10-15 | Stop reason: HOSPADM

## 2019-10-11 RX ORDER — FACIAL-BODY WIPES
10 EACH TOPICAL DAILY
Status: DISCONTINUED | OUTPATIENT
Start: 2019-10-11 | End: 2019-10-15 | Stop reason: HOSPADM

## 2019-10-11 RX ADMIN — KETOROLAC TROMETHAMINE 15 MG: 15 INJECTION, SOLUTION INTRAMUSCULAR; INTRAVENOUS at 10:19

## 2019-10-11 RX ADMIN — PROMETHAZINE HYDROCHLORIDE 12.5 MG: 25 INJECTION, SOLUTION INTRAMUSCULAR; INTRAVENOUS at 12:39

## 2019-10-11 RX ADMIN — ONDANSETRON 4 MG: 2 INJECTION INTRAMUSCULAR; INTRAVENOUS at 22:55

## 2019-10-11 RX ADMIN — ALBUMIN (HUMAN) 12.5 G: 0.25 INJECTION, SOLUTION INTRAVENOUS at 06:00

## 2019-10-11 RX ADMIN — BUPROPION HYDROCHLORIDE 300 MG: 150 TABLET, EXTENDED RELEASE ORAL at 06:11

## 2019-10-11 RX ADMIN — LEVOTHYROXINE SODIUM 87.5 MCG: 25 TABLET ORAL at 06:11

## 2019-10-11 RX ADMIN — UMECLIDINIUM 1 PUFF: 62.5 AEROSOL, POWDER ORAL at 08:54

## 2019-10-11 RX ADMIN — CEFTRIAXONE 1 G: 1 INJECTION, POWDER, FOR SOLUTION INTRAMUSCULAR; INTRAVENOUS at 18:21

## 2019-10-11 RX ADMIN — ALBUMIN (HUMAN) 12.5 G: 0.25 INJECTION, SOLUTION INTRAVENOUS at 00:49

## 2019-10-11 RX ADMIN — DOCUSATE SODIUM 100 MG: 100 CAPSULE, LIQUID FILLED ORAL at 22:45

## 2019-10-11 RX ADMIN — IPRATROPIUM BROMIDE AND ALBUTEROL SULFATE 3 ML: .5; 3 SOLUTION RESPIRATORY (INHALATION) at 07:55

## 2019-10-11 RX ADMIN — OXYCODONE HYDROCHLORIDE AND ACETAMINOPHEN 1 TABLET: 5; 325 TABLET ORAL at 18:21

## 2019-10-11 RX ADMIN — NYSTATIN AND TRIAMCINOLONE ACETONIDE: 100000; 1 CREAM TOPICAL at 08:54

## 2019-10-11 RX ADMIN — POTASSIUM & SODIUM PHOSPHATES POWDER PACK 280-160-250 MG 2 PACKET: 280-160-250 PACK at 22:45

## 2019-10-11 RX ADMIN — CHLORASEPTIC 1 SPRAY: 1.5 LIQUID ORAL at 15:18

## 2019-10-11 RX ADMIN — ONDANSETRON 4 MG: 2 INJECTION INTRAMUSCULAR; INTRAVENOUS at 08:52

## 2019-10-11 RX ADMIN — MONTELUKAST 10 MG: 10 TABLET, FILM COATED ORAL at 22:45

## 2019-10-11 RX ADMIN — IPRATROPIUM BROMIDE AND ALBUTEROL SULFATE 3 ML: .5; 3 SOLUTION RESPIRATORY (INHALATION) at 19:52

## 2019-10-11 RX ADMIN — PANTOPRAZOLE SODIUM 40 MG: 40 INJECTION, POWDER, FOR SOLUTION INTRAVENOUS at 10:19

## 2019-10-11 RX ADMIN — Medication 10 ML: at 06:14

## 2019-10-11 RX ADMIN — OXYCODONE HYDROCHLORIDE AND ACETAMINOPHEN 1 TABLET: 5; 325 TABLET ORAL at 08:36

## 2019-10-11 RX ADMIN — NYSTATIN AND TRIAMCINOLONE ACETONIDE: 100000; 1 CREAM TOPICAL at 18:22

## 2019-10-11 RX ADMIN — NYSTATIN AND TRIAMCINOLONE ACETONIDE: 100000; 1 CREAM TOPICAL at 22:46

## 2019-10-11 NOTE — PROGRESS NOTES
[facility]  Pulmonary, Critical Care, and Sleep Medicine    Pulm/CC Note    Name: Cody Dueñas MRN: [id number]   : 1950 Hospital: [facility] [place]   Date: 10/11/2019  Room: [facility]     Subjective: This patient has been seen and evaluated at the request of Dr.D DIEHL [facility] for patient with UTI and sepsis. Patient is a 71 y.o. 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 Patient was admitted in 2019 UTIs and kidney stones. She was discharged and then underwent cystoscopy and RT ureteral stent placement 19. Yesterday, she came for elective cystoscopy, right ureteroscopy with holmium laser lithotripsy and stent change. Postop she developed tachycardia, sepsis and moved to icu. She was hypotensive overnight despite fluids and started levophed.     10/11/19   Patient has been moved out of icu  Awake and alert  She gets SOB on mild exertion; known COPD patient  She has been having abd pains and vomiting today morning      Past Medical History:   Diagnosis Date    Asthma     Depression     Emphysema of lung ([address]     Gastroparesis     Hypothyroidism     Malabsorption     of fat    Pancreatic insufficiency     Restless leg     bilateral      Past Surgical History:   Procedure Laterality Date    ABDOMEN SURGERY PROC UNLISTED      HX HERNIA REPAIR      HX SMALL BOWEL RESECTION      intra aortic baloon pump    HX TUBAL LIGATION          No Known Allergies   Social History     Tobacco Use    Smoking status: Former Smoker    Smokeless tobacco: Never Used   Substance Use Topics    Alcohol use: Never     Frequency: Never           Current Facility-Administered Medications   Medication Dose Route Frequency    docusate sodium (COLACE) capsule 100 mg  100 mg Oral BID    heparin (porcine) injection 5,000 Units  5,000 Units SubCUTAneous Q8H    pantoprazole (PROTONIX) 40 mg in sodium chloride 0.9% 10 mL injection  40 mg IntraVENous Q24H    cefTRIAXone (ROCEPHIN) 1 g in sterile water (preservative free) 10 mL IV syringe  1 g IntraVENous Q24H    buPROPion XL (WELLBUTRIN XL) tablet 300 mg  300 mg Oral 7am    DULoxetine (CYMBALTA) capsule 60 mg  60 mg Oral DAILY    levothyroxine (SYNTHROID) tablet 87.5 mcg  87.5 mcg Oral ACB    montelukast (SINGULAIR) tablet 10 mg  10 mg Oral DAILY    multivitamin, tx-iron-ca-min (THERA-M w/ IRON) tablet 1 Tab  1 Tab Oral DAILY    nystatin-triamcinolone (MYCOLOG II) 100,000-0.1 unit/g-% cream   Topical TID    umeclidinium (INCRUSE ELLIPTA) 62.5 mcg/actuation  1 Puff Inhalation DAILY    sodium chloride (NS) flush 5-40 mL  5-40 mL IntraVENous Q8H    0.9% sodium chloride infusion  75 mL/hr IntraVENous CONTINUOUS    lipase-protease-amylase (ZENPEP 10,000) capsule 1 Cap  1 Cap Oral TID WITH MEALS       Latest lactic acid:   Lactic acid   Date Value Ref Range Status   10/09/2019 1.7 0.4 - 2.0 MMOL/L Final   10/09/2019 2.9 (HH) 0.4 - 2.0 MMOL/L Final     Comment:     CALLED TO AND CORRECTLY REPEATED BY:  ARIANA PAUL RN ICU TO [facility] AT [address] [place] [id number]     10/09/2019 2.6 (HH) 0.4 - 2.0 MMOL/L Final     Comment:     CALLED TO AND CORRECTLY REPEATED BY:  ARIANA PAUL TO DS AT 1120       Review of Systems:  Ears, nose, mouth, throat, and face: No epistaxis, no difficulty in swallowing  Respiratory: as above  Cardiovascular: no chest pain or palpitations  Gastrointestinal: no abd pain, vomitting or diarrhea; + chronic malnutrition state  Genitourinary: as above  Neurological: No focal weakness or seizures   Constitutional: No fever or chills; + chronic weight loss       Objective:   Vital Signs:    Visit Vitals  /88 (BP 1 Location: Left arm, BP Patient Position: At rest;Supine)   Pulse 97   Temp 97.9 °F (36.6 °C)   Resp 20   Ht 4' 11\" (1.499 m)   Wt 55.6 kg (122 lb 9.2 oz)   SpO2 99% Breastfeeding? No   BMI 24.76 kg/m²       O2 Device: Room air   O2 Flow Rate (L/min): 0 l/min   Temp (24hrs), Av °F (36.7 °C), Min:97.3 °F (36.3 °C), Max:98.5 °F (36.9 °C)       Intake/Output:   Last shift:      10/11 0701 - 10/11 1900  In: -   Out: 100 [Urine:100]  Last 3 shifts: 10/09 1901 - 10/11 0700  In: 360 [P.O.:360]  Out: 700 [Urine:700]    Intake/Output Summary (Last 24 hours) at 10/11/2019 1221  Last data filed at 10/11/2019 0741  Gross per 24 hour   Intake 240 ml   Output 100 ml   Net 140 ml         Physical Exam:   Comfortable; appears older than stated age; thin and frail; on room air; acyanotic  Neck: No adenopathy or thyroid swelling  CVS: S1S2 no murmurs; JVD not elevated  RS: Mod air entry bilaterally, symmetrical BS; no wheezes or crackles; not tachypneic  Abd: soft, non tender, no abd distension, no guarding or rigidity, bowel sounds heard  Neuro: awake, alert, moving all extremities   Extrm: no leg edema or swelling or clubbing  Skin: no rash  Lymphatic: no cervical or supraclavicular adenopathy    Telemetry:     Data review:     Recent Results (from the past 24 hour(s))   PHOSPHORUS    Collection Time: 10/11/19  1:23 AM   Result Value Ref Range    Phosphorus 2.0 (L) 2.5 - 4.9 MG/DL   MAGNESIUM    Collection Time: 10/11/19  1:23 AM   Result Value Ref Range    Magnesium 1.8 1.6 - 2.6 mg/dL           No results for input(s): FIO2I, IFO2, HCO3I, IHCO3, HCOPOC, PCO2I, PCOPOC, IPHI, PHI, PHPOC, PO2I, PO2POC in the last 72 hours.     No lab exists for component: IPOC2    All Micro Results     Procedure Component Value Units Date/Time    CULTURE, BLOOD [[id number]] Collected:  10/08/19 3839    Order Status:  Completed Specimen:  Blood Updated:  10/11/19 0712     Special Requests: NO SPECIAL REQUESTS        Culture result: NO GROWTH 3 DAYS       CULTURE, URINE [[id number]] Collected:  10/08/19 1800    Order Status:  Completed Specimen:  Cath Urine Updated:  10/10/19 1047     Special Requests: NO SPECIAL REQUESTS        Culture result: NO GROWTH 2 DAYS             Imaging:  [x]I have personally reviewed the patients chest radiographs images and report     CXR 10/9   Impression:  Hyperinflated lungs may be due to underlying emphysema. No new abnormality is  seen developing since last study. Xray 10/11  Results from Hospital Encounter encounter on 10/08/19   XR ABD (KUB)    Narrative History: Sepsis, nausea and vomiting    Supine views of the abdomen demonstrate nonobstructive bowel gas pattern without  free air. Masses: None. Calcifications: Vascular calcifications upper abdominal aorta. Bony structures: Mild levoscoliosis and degenerative changes. Right-sided ureteral stent in place. Surgical clips also present. Impression Impression:    No evidence of an acute abdominal process. Results from [place] encounter on 09/25/19   CT ABD PELV W CONT    Narrative EXAM: CT of the abdomen and pelvis    INDICATION: Abdominal pain, flank pain. Right-sided abdominal pain. COMPARISON: 8/31/2019, 4/28/2019    TECHNIQUE: Axial CT imaging of the abdomen and pelvis was performed with  intravenous contrast. Multiplanar reformats were generated. One or more dose  reduction techniques were used on this CT: automated exposure control,  adjustment of the mAs and/or kVp according to patient size, and iterative  reconstruction techniques. The specific techniques used on this CT exam have  been documented in the patient's electronic medical record. Digital Imaging and  Communications in the Medicine (DICOM) format image data are available to  nonaffiliated external healthcare facilities or entities on a secure, media  free, reciprocally searchable basis with patient authorization for at least a 12  month period after this study. _______________    FINDINGS:    LOWER CHEST: Emphysematous changes. Regions of basilar scarring-atelectasis.     LIVER, BILIARY: Liver is normal. Gallbladder is moderately distended but  thin-walled. There is nonspecific dilatation of the common bile duct, 9 mm in  diameter. PANCREAS: Normal.    SPLEEN: Normal.    ADRENALS: Normal.    KIDNEYS: There is severe right perirenal-retroperitoneal inflammatory stranding  with marked urothelial enhancement of the right renal pelvis and proximal ureter  with an associated nonobstructing calculus within the left renal pelvis which  measures approximately 8 mm in diameter. Kidneys enhance  symmetrically-homogenously. LYMPH NODES: No enlarged lymph nodes. GASTROINTESTINAL TRACT: No bowel obstruction or acute appearing wall thickening. Previous surgical anastomosis involving the stomach and bowel noted. PELVIC ORGANS: Stable right pelvic ovoid cyst, 42 x 20 mm. Dorita Hind VASCULATURE: Unremarkable. BONES: Interval but remote appearing superior endplate compression deformity at  L4. No acute or aggressive osseous abnormalities identified. OTHER: None.    _______________      Impression IMPRESSION:    1. There is severe right perirenal-retroperitoneal inflammatory stranding with  marked urothelial enhancement of the right renal pelvis and proximal ureter with  an associated nonobstructing calculus within the left renal pelvis which  measures approximately 8 mm in diameter. Findings suggest severe pyelitis. 2. Gallbladder is moderately distended but thin-walled. There is nonspecific  dilatation of the common bile duct, 9 mm in diameter. Echo  Interpretation Summary   Result status: Final result   · Left Ventricle: Normal cavity size and wall thickness. Low normal systolic dysfunction. Estimated left ventricular ejection fraction is 51 - 55%. Mild (grade 1) left ventricular diastolic dysfunction E/e' ratio is 9.82. · Right Ventricle: Dilated right ventricle. Moderately reduced systolic function. · Aortic Valve: Probably trileaflet aortic valve. Mild aortic valve sclerosis.   · Tricuspid Valve: Mild to moderate tricuspid valve regurgitation is present. · Mitral Valve: Mitral valve thickening. Mild mitral annular calcification. Mild mitral valve regurgitation is present. · Pulmonary Artery: Mild to moderate pulmonary hypertension. Pulmonary arterial systolic pressure is 43 mmHg. IMPRESSION:   · Sepsis  · UTI  · Septic shock  · Renal stone   · Malnutrition     Patient Active Problem List   Diagnosis Code    Enterocutaneous fistula K63.2    Ureterolithiasis N20.1    Renal stone N20.0    Sepsis ([address] A41.9    Septic shock ([address] A41.9, R65.21    Malnutrition ([address] E46 ·   COPD J44.9  ·       RECOMMENDATIONS:   · Stable respirations  · Incruse inhaler daily  · Prn albut inh  · Patient currently on room air  · Echo EF is good  · Blood and urine cx neg so far; ceftriaxone - complete 5 days antibiotic; prior urine cx showed klebsiella-pansensitive  · Fluids: NS decreased 50/hr  · Oral Neutraphos x 2 days  · Nutrition: oral diet as tolerated  · Proph:  DVt and GI proph-sc heparin and PPI  · D/w patient and updated medical management  · Pulm will sign off; would see in clinic in 2-4 weeks  ADVANCE DIRECTIVE: DNR status; [place] document done in icu.       Moderate complexity decision making was performed in this consultation and evaluation of this patient          Mick Bean MD

## 2019-10-11 NOTE — PROGRESS NOTES
Transition of care:anticipate d/c home when medically cleared  Met wit patient and Dr. Robel Cloud at bedside. Patent co constipation dr. Robel Cloud will address. Patient reports she lives with her roommate. Her daughter is available been recommended by pt for hhcbe to drive her to and from appointments. patient reports she has a shower chair. He has medicare and medicaid. She has chosen to use [facility] per foc referral placed by Ralph Combs CM. Patient recommended by pt for hhc. She denies other needs she will need follow up wit Dr. Cecilia Hernandez and pcp when she is d/c cms will assist.  Care Management Interventions  PCP Verified by CM: Yes  Palliative Care Criteria Met (RRAT>21 & CHF Dx)?: No  Transition of Care Consult (CM Consult): [address]: Yes  Current Support Network:  Other(has roommates )  Confirm Follow Up Transport: (daughter)

## 2019-10-11 NOTE — PROGRESS NOTES
2943 Assumed care of the patient from Andra Chavez RN (offgoing Nurse). Patient is alert and oriented. Pt denies any pain or discomfort at this moment. bed in low position, call bell within reach. 2353 Pt c/o pain . Administered percocet prn.    0852 Pt c/o nausea and vomitting. Administered zofran prn      0950 Pt  pain and nausea not relieved. Spoke with Dr. Eleazar Dahl regarding Pt c/o nausea/vomitting and pain. Order to be followed. 1019 Administered toradol as ordered. 1821 Administered percocet prn for pain. 1920 Bedside and Verbal shift change report given to Andra Chavez RN (oncoming nurse) by Gelacio Atkinson RN (offgoing nurse). Report included the following information SBAR, Kardex, MAR, Recent Results and Cardiac Rhythm .

## 2019-10-11 NOTE — PROGRESS NOTES
Hospitalist Progress Note-critical care note     Patient: Roberta Bergeron MRN: [id number]  CSN: [id number]    YOB: 1950  Age: 71 y.o. Sex: female    DOA: 10/8/2019 LOS:  LOS: 3 days            Chief complaint: septic shock, sepsis. Renal stone, pyelonephritis     Assessment/Plan         Hospital Problems  Date Reviewed: 10/8/2019          Codes Class Noted POA    Septic shock ([facility] ICD-10-CM: A41.9, R65.21  ICD-9-CM: 038.9, 785.52, 995.92  10/9/2019 Unknown        Malnutrition ([facility] ICD-10-CM: E46  ICD-9-CM: 263.9  10/9/2019 Unknown        Sepsis ([facility] ICD-10-CM: A41.9  ICD-9-CM: 038.9, 995.91  10/8/2019 Unknown        * (Principal) Renal stone ICD-10-CM: N20.0  ICD-9-CM: 592.0  10/7/2019 Yes        Pyelonephritis ICD-10-CM: N12  ICD-9-CM: 590.80  8/31/2019 Unknown              Septic shock   Resolved,   So far cx negative     Pyelonephritis   Continue Levaquin and rocephin    f/u so far negative     Renal stone  Cystoscopy, right retrograde pyelogram with interpretation, right ureteroscopy with holmium laser lithotripsy and stent change  Managed per primary team       Pain all over   Received percocet , will one time Toradol   Kub no acute issue     Leukocytosis    Afebrile ,so far cx negative   Renal ultrasound : calculi and/or ureteral stent. Will continue obs ,recheck cbc tomorrow     Need  Increase physical activity, pt/ot. Subjective: pain all over   rn :pain ,sore throat     kub no acute issue , add phenol for sore throat . Colace for constipation   Review of systems:    General: No fevers or chills. Tired   Cardiovascular: No chest pain or pressure. No palpitations. Pulmonary: No shortness of breath. Gastrointestinal: No nausea, vomiting. Vital signs/Intake and Output:  Visit Vitals  /88 (BP 1 Location: Left arm, BP Patient Position: At rest;Supine)   Pulse 97   Temp 97.9 °F (36.6 °C)   Resp 20   Ht 4' 11\" (1.499 m)   Wt 55.6 kg (122 lb 9.2 oz)   SpO2 99%   Breastfeeding? No   BMI 24.76 kg/m²     Current Shift:  10/11 0701 - 10/11 1900  In: -   Out: 100 [Urine:100]  Last three shifts:  10/09 1901 - 10/11 0700  In: 360 [P.O.:360]  Out: 700 [Urine:700]    Physical Exam:  General: WD, WN. Alert, cooperative, no acute distress    HEENT: NC, Atraumatic. PERRLA, anicteric sclerae. Lungs: CTA Bilaterally. No Wheezing/Rhonchi/Rales. Heart:  Regular  rhythm,  No murmur, No Rubs, No Gallops  Abdomen: Soft, Non distended, Non tender.  +Bowel sounds,   Extremities: No c/c/e  Psych:   Not anxious or agitated. Neurologic:  No acute neurological deficit. Labs: Results:       Chemistry Recent Labs     10/10/19  0415 10/09/19  2115 10/09/19  1417 10/09/19  0204   GLU 77  --   --  86     --   --  140   K 3.5 4.1 3.9 3.8   *  --   --  109   CO2 18*  --   --  23   BUN 11  --   --  11   CREA 0.55*  --   --  0.72   CA 8.0*  --   --  7.5*   AGAP 8  --   --  8   BUCR 20  --   --  15   AP  --   --   --  221*   TP  --   --   --  4.6*   ALB  --   --   --  1.8*   GLOB  --   --   --  2.8   AGRAT  --   --   --  0.6*      CBC w/Diff Recent Labs     10/10/19  0415 10/09/19  0204 10/08/19  1734   WBC 19.1* 14.9* 1.1*   RBC 3.19* 3.59* 4.00*   HGB 8.7* 9.8* 10.9*   HCT 27.7* 31.1* 34.7*    258 342   GRANS 84* 49 40*   LYMPH 8* 4* 34   EOS 1 0 2      Cardiac Enzymes No results for input(s): CPK, CKND1, JOSE in the last 72 hours. No lab exists for component: CKRMB, TROIP   Coagulation No results for input(s): PTP, INR, APTT, INREXT, INREXT in the last 72 hours. Lipid Panel No results found for: CHOL, CHOLPOCT, CHOLX, CHLST, CHOLV, 097336, HDL, HDLP, LDL, LDLC, DLDLP, 280886, VLDLC, VLDL, TGLX, TRIGL, TRIGP, TGLPOCT, CHHD, CHHDX   BNP No results for input(s): BNPP in the last 72 hours.    Liver Enzymes Recent Labs     10/09/19  0204   TP 4.6*   ALB 1.8*   *   SGOT 222*      Thyroid Studies No results found for: T4, T3U, TSH, TSHEXT, TSHEXT     Procedures/imaging: see electronic medical records for all procedures/Xrays and details which were not copied into this note but were reviewed prior to creation of Khanh Patel MD

## 2019-10-11 NOTE — PROGRESS NOTES
NUTRITION FOLLOW-UP    RECOMMENDATIONS/PLAN:   - Continue w/ POC   -Monitor labs/lytes, BM, PO intake, skin integrity, wt, fluid status    NUTRITION ASSESSMENT:   Client Update: 71 yrs old Female with renal stone operation. Rapid response 10/8, enterocutaneous fistula, pyelonephritis, ureterolithiasis, pyelitis, sepsis     FOOD/NUTRITION INTAKE   Diet Order:  regular   Supplements: none  Food Allergies: NKFA/  Average PO Intake:      Patient Vitals for the past 100 hrs:   % Diet Eaten   10/10/19 1925 100 %      Pertinent Medications:  [x] Reviewed; zofran, imodium, synthroid, protonix  Electrolyte Replacement Protocol: []K []Mg []PO4  Insulin:  []SSI  []Pre-meal   []Basal    []Drip  [x]None  Cultural/Yarsanism Food Preferences: None Identified    BIOCHEMICAL DATA & MEDICAL TESTS  Pertinent Labs:  [x] Reviewed; phosphorus-2.0   ANTHROPOMETRICS  Height: 4' 11\" (149.9 cm)       Weight: 55.6 kg (122 lb 9.2 oz)         BMI: 24.7 kg/m^2 normal weight (18.5%-24.9% BMI)   Adm Weight: 91 lbs                Weight change: +31 lb x2 days, suspect incorrect wt  Pt was 91 lbs yesterday  Adjusted Body Weight: n/a    NUTRITION-FOCUSED PHYSICAL ASSESSMENT  Skin: No PU     GI: +10/8    NUTRITION PRESCRIPTION  Calories: 5466-5559 kcal/day based on 40-45 kcal/kg  Protein: 62-83 g/day based on 1.5-2 g/kg  Fluid: 3176-3598 ml/day based on 1 kcal/ml      NUTRITION DIAGNOSES:   1. Malnutrition related to inadequate oral intake as evidenced by NFPE. NUTRITION INTERVENTIONS:   INTERVENTIONS:        GOALS:  1. Other: continue w/ POC 1.  Encourage PO intake >75%  by next review 4 days     LEARNING NEEDS (Diet, Supplementation, Food/Nutrient-Drug Interaction):   [] None Identified   [] Education provided/documented      Identified and patient: [] Declined   [x] Was not appropriate/indicated        NUTRITION MONITORING /EVALUATION:   Follow PO intake  Monitor wt  Monitor renal labs, electrolytes, fluid status  Monitor for additional supplement needs     Previous Recommendations Implemented: yes        Previous Goals Met:  yes -      [] Participated in Interdisciplinary Rounds    [x] Interdisciplinary Care Plan Reviewed  DISCHARGE NUTRITION RECOMMENDATIONS ADDRESSED:          [x] To be determined closer to discharge    NUTRITION RISK:           [x] At risk                        [] Not currently at risk        Will follow-up per policy.   Laura Chamberlain 1

## 2019-10-11 NOTE — PROGRESS NOTES
1925: Assumed patient care from [address] [place] [address] Rd., [address]. Patient is alert and oriented to person, place, time and situation. Respiratory status Is stable on room air. Vital signs are stable. MEWS score is a one. Patient denies any pain, discomfort, nausea vomiting dizziness or anxiety. White board and fall card is updated. Bed is locked and in lowest position. Call bell, water and personal belongings are within reach. Patient has no questions, comments or concerns after bedside shift report. 0700: Patient had an uneventful shift. Respiratory status, vital signs and MEWS score remained stable. Patient was resting quietly with no signs of distress noted. Bed locked and in lowest position. Call bell water and personal belongings were within reach. Patient had no questions, comments or concerns after bedside shift report.  Bedside report given to Clary Yanes R.N.

## 2019-10-11 NOTE — PROGRESS NOTES
Problem: Falls - Risk of  Goal: *Absence of Falls  Description  Document Natacha Patches Fall Risk and appropriate interventions in the flowsheet. Outcome: Progressing Towards Goal  Note:   Fall Risk Interventions:  Mobility Interventions: Assess mobility with egress test         Medication Interventions: Bed/chair exit alarm    Elimination Interventions: Bed/chair exit alarm, Call light in reach              Problem: Falls - Risk of  Goal: *Absence of Falls  Description  Document Natacha Patches Fall Risk and appropriate interventions in the flowsheet.   Outcome: Progressing Towards Goal  Note:   Fall Risk Interventions:  Mobility Interventions: Assess mobility with egress test         Medication Interventions: Bed/chair exit alarm    Elimination Interventions: Bed/chair exit alarm, Call light in reach

## 2019-10-11 NOTE — PROGRESS NOTES
1406: Pt refusing to participate in PT due to stomach upset. Will follow up as schedule permits. 1604:  2nd attempt and pt still refusing due to stomach upset. Will follow up tomorrow.

## 2019-10-11 NOTE — PROGRESS NOTES
Urology Progress Note    Subjective:     Daily Progress Note: 10/11/2019 3:10 PM    Yuliana Bryant is doing okay. Throat is a little sore.  + nausea / bilious emesis. No BM for 3 days (usually daily). Passing flatus at times. No abd or flank pain    Objective:     Visit Vitals  /88 (BP 1 Location: Left arm, BP Patient Position: At rest;Supine)   Pulse 97   Temp 97.9 °F (36.6 °C)   Resp 20   Ht 4' 11\" (1.499 m)   Wt 55.6 kg (122 lb 9.2 oz)   SpO2 99%   Breastfeeding? No   BMI 24.76 kg/m²        Temp (24hrs), Av.2 °F (36.8 °C), Min:97.7 °F (36.5 °C), Max:98.5 °F (36.9 °C)      Intake and Output:  10/09 1901 - 10/11 07  In: 360 [P.O.:360]  Out: 700 [Urine:700]  10/11 07 - 10/11 190  In: -   Out: 700 [Urine:600]    Physical Exam:   Gen - Gaunt, at baseline status, NAD  RRR  Breathing easy  Abd - soft, nd, nt  Ext - no edema        Lab/Data Review:    KUB = negative    Assessment/Plan:     Principal Problem:    Renal stone (10/7/2019)    Active Problems:    Pyelonephritis (2019)      Sepsis ([address] (10/8/2019)      Septic shock ([address] (10/9/2019)      Malnutrition ([address] (10/9/2019)        Plan:    Ambulate.   PT  Dulcolax  Labs in am

## 2019-10-12 LAB
ALBUMIN SERPL-MCNC: 2.6 G/DL (ref 3.4–5)
ALBUMIN/GLOB SERPL: 1 {RATIO} (ref 0.8–1.7)
ALP SERPL-CCNC: 238 U/L (ref 45–117)
ALT SERPL-CCNC: 21 U/L (ref 13–56)
ANION GAP SERPL CALC-SCNC: 9 MMOL/L (ref 3–18)
AST SERPL-CCNC: 18 U/L (ref 10–38)
BASOPHILS # BLD: 0 K/UL (ref 0–0.1)
BASOPHILS NFR BLD: 0 % (ref 0–2)
BILIRUB SERPL-MCNC: 0.9 MG/DL (ref 0.2–1)
BUN SERPL-MCNC: 6 MG/DL (ref 7–18)
BUN/CREAT SERPL: 14 (ref 12–20)
CALCIUM SERPL-MCNC: 8.1 MG/DL (ref 8.5–10.1)
CHLORIDE SERPL-SCNC: 109 MMOL/L (ref 100–111)
CO2 SERPL-SCNC: 22 MMOL/L (ref 21–32)
CREAT SERPL-MCNC: 0.44 MG/DL (ref 0.6–1.3)
DIFFERENTIAL METHOD BLD: ABNORMAL
EOSINOPHIL # BLD: 0.1 K/UL (ref 0–0.4)
EOSINOPHIL NFR BLD: 1 % (ref 0–5)
ERYTHROCYTE [DISTWIDTH] IN BLOOD BY AUTOMATED COUNT: 17.1 % (ref 11.6–14.5)
GLOBULIN SER CALC-MCNC: 2.6 G/DL (ref 2–4)
GLUCOSE SERPL-MCNC: 87 MG/DL (ref 74–99)
HCT VFR BLD AUTO: 29.2 % (ref 35–45)
HGB BLD-MCNC: 9.5 G/DL (ref 12–16)
LYMPHOCYTES # BLD: 1.7 K/UL (ref 0.9–3.6)
LYMPHOCYTES NFR BLD: 13 % (ref 21–52)
MAGNESIUM SERPL-MCNC: 1.6 MG/DL (ref 1.6–2.6)
MCH RBC QN AUTO: 27.3 PG (ref 24–34)
MCHC RBC AUTO-ENTMCNC: 32.5 G/DL (ref 31–37)
MCV RBC AUTO: 83.9 FL (ref 74–97)
MONOCYTES # BLD: 1.2 K/UL (ref 0.05–1.2)
MONOCYTES NFR BLD: 9 % (ref 3–10)
NEUTS SEG # BLD: 9.8 K/UL (ref 1.8–8)
NEUTS SEG NFR BLD: 77 % (ref 40–73)
PHOSPHATE SERPL-MCNC: 2.6 MG/DL (ref 2.5–4.9)
PLATELET # BLD AUTO: 193 K/UL (ref 135–420)
PMV BLD AUTO: 9.8 FL (ref 9.2–11.8)
POTASSIUM SERPL-SCNC: 3.6 MMOL/L (ref 3.5–5.5)
PROT SERPL-MCNC: 5.2 G/DL (ref 6.4–8.2)
RBC # BLD AUTO: 3.48 M/UL (ref 4.2–5.3)
SODIUM SERPL-SCNC: 140 MMOL/L (ref 136–145)
WBC # BLD AUTO: 12.8 K/UL (ref 4.6–13.2)

## 2019-10-12 PROCEDURE — 3331090001 HH PPS REVENUE CREDIT

## 2019-10-12 PROCEDURE — 74011250637 HC RX REV CODE- 250/637: Performed by: INTERNAL MEDICINE

## 2019-10-12 PROCEDURE — 80053 COMPREHEN METABOLIC PANEL: CPT

## 2019-10-12 PROCEDURE — 74011250636 HC RX REV CODE- 250/636: Performed by: INTERNAL MEDICINE

## 2019-10-12 PROCEDURE — 74011000250 HC RX REV CODE- 250: Performed by: UROLOGY

## 2019-10-12 PROCEDURE — 84100 ASSAY OF PHOSPHORUS: CPT

## 2019-10-12 PROCEDURE — 3331090002 HH PPS REVENUE DEBIT

## 2019-10-12 PROCEDURE — 85025 COMPLETE CBC W/AUTO DIFF WBC: CPT

## 2019-10-12 PROCEDURE — 74011000250 HC RX REV CODE- 250: Performed by: FAMILY MEDICINE

## 2019-10-12 PROCEDURE — 74011250637 HC RX REV CODE- 250/637: Performed by: HOSPITALIST

## 2019-10-12 PROCEDURE — 83735 ASSAY OF MAGNESIUM: CPT

## 2019-10-12 PROCEDURE — C9113 INJ PANTOPRAZOLE SODIUM, VIA: HCPCS | Performed by: INTERNAL MEDICINE

## 2019-10-12 PROCEDURE — 74011250636 HC RX REV CODE- 250/636: Performed by: UROLOGY

## 2019-10-12 PROCEDURE — 97530 THERAPEUTIC ACTIVITIES: CPT

## 2019-10-12 PROCEDURE — 94640 AIRWAY INHALATION TREATMENT: CPT

## 2019-10-12 PROCEDURE — 74011000250 HC RX REV CODE- 250: Performed by: INTERNAL MEDICINE

## 2019-10-12 PROCEDURE — 74011250636 HC RX REV CODE- 250/636: Performed by: HOSPITALIST

## 2019-10-12 PROCEDURE — 36415 COLL VENOUS BLD VENIPUNCTURE: CPT

## 2019-10-12 PROCEDURE — 65660000000 HC RM CCU STEPDOWN

## 2019-10-12 PROCEDURE — 74011000258 HC RX REV CODE- 258: Performed by: HOSPITALIST

## 2019-10-12 PROCEDURE — 94760 N-INVAS EAR/PLS OXIMETRY 1: CPT

## 2019-10-12 PROCEDURE — 74011250637 HC RX REV CODE- 250/637: Performed by: UROLOGY

## 2019-10-12 RX ORDER — ERYTHROMYCIN 250 MG/1
500 CAPSULE, DELAYED RELEASE ORAL 3 TIMES DAILY
Status: DISCONTINUED | OUTPATIENT
Start: 2019-10-12 | End: 2019-10-14

## 2019-10-12 RX ORDER — LIDOCAINE 50 MG/G
OINTMENT TOPICAL AS NEEDED
Status: DISCONTINUED | OUTPATIENT
Start: 2019-10-12 | End: 2019-10-12

## 2019-10-12 RX ORDER — HYDROCODONE BITARTRATE AND ACETAMINOPHEN 5; 325 MG/1; MG/1
1 TABLET ORAL
Status: DISCONTINUED | OUTPATIENT
Start: 2019-10-12 | End: 2019-10-15 | Stop reason: HOSPADM

## 2019-10-12 RX ORDER — LIDOCAINE 50 MG/G
OINTMENT TOPICAL
Status: DISCONTINUED | OUTPATIENT
Start: 2019-10-12 | End: 2019-10-15 | Stop reason: HOSPADM

## 2019-10-12 RX ADMIN — NYSTATIN AND TRIAMCINOLONE ACETONIDE: 100000; 1 CREAM TOPICAL at 15:05

## 2019-10-12 RX ADMIN — ERYTHROMYCIN 500 MG: 250 CAPSULE, DELAYED RELEASE PELLETS ORAL at 21:04

## 2019-10-12 RX ADMIN — LEVOTHYROXINE SODIUM 87.5 MCG: 25 TABLET ORAL at 06:42

## 2019-10-12 RX ADMIN — DOCUSATE SODIUM 100 MG: 100 CAPSULE, LIQUID FILLED ORAL at 08:53

## 2019-10-12 RX ADMIN — NYSTATIN AND TRIAMCINOLONE ACETONIDE: 100000; 1 CREAM TOPICAL at 21:07

## 2019-10-12 RX ADMIN — ERYTHROMYCIN 500 MG: 250 CAPSULE, DELAYED RELEASE PELLETS ORAL at 15:05

## 2019-10-12 RX ADMIN — MONTELUKAST 10 MG: 10 TABLET, FILM COATED ORAL at 21:00

## 2019-10-12 RX ADMIN — Medication 10 ML: at 21:07

## 2019-10-12 RX ADMIN — BISACODYL 10 MG: 10 SUPPOSITORY RECTAL at 08:53

## 2019-10-12 RX ADMIN — CEFTRIAXONE 1 G: 1 INJECTION, POWDER, FOR SOLUTION INTRAMUSCULAR; INTRAVENOUS at 18:05

## 2019-10-12 RX ADMIN — HYDROCODONE BITARTRATE AND ACETAMINOPHEN 1 TABLET: 5; 325 TABLET ORAL at 21:04

## 2019-10-12 RX ADMIN — POTASSIUM & SODIUM PHOSPHATES POWDER PACK 280-160-250 MG 2 PACKET: 280-160-250 PACK at 08:53

## 2019-10-12 RX ADMIN — ONDANSETRON 4 MG: 2 INJECTION INTRAMUSCULAR; INTRAVENOUS at 10:55

## 2019-10-12 RX ADMIN — PANTOPRAZOLE SODIUM 40 MG: 40 INJECTION, POWDER, FOR SOLUTION INTRAVENOUS at 10:55

## 2019-10-12 RX ADMIN — IPRATROPIUM BROMIDE AND ALBUTEROL SULFATE 3 ML: .5; 3 SOLUTION RESPIRATORY (INHALATION) at 10:52

## 2019-10-12 RX ADMIN — DOCUSATE SODIUM 100 MG: 100 CAPSULE, LIQUID FILLED ORAL at 21:04

## 2019-10-12 RX ADMIN — Medication 10 ML: at 14:05

## 2019-10-12 RX ADMIN — DULOXETINE 60 MG: 60 CAPSULE, DELAYED RELEASE ORAL at 08:53

## 2019-10-12 RX ADMIN — MULTIPLE VITAMINS W/ MINERALS TAB 1 TABLET: TAB at 08:53

## 2019-10-12 RX ADMIN — HYDROCODONE BITARTRATE AND ACETAMINOPHEN 1 TABLET: 5; 325 TABLET ORAL at 12:55

## 2019-10-12 RX ADMIN — BUPROPION HYDROCHLORIDE 300 MG: 150 TABLET, EXTENDED RELEASE ORAL at 06:42

## 2019-10-12 RX ADMIN — Medication 10 ML: at 06:46

## 2019-10-12 RX ADMIN — SODIUM CHLORIDE 50 ML/HR: 900 INJECTION, SOLUTION INTRAVENOUS at 11:37

## 2019-10-12 RX ADMIN — PROMETHAZINE HYDROCHLORIDE 12.5 MG: 25 INJECTION, SOLUTION INTRAMUSCULAR; INTRAVENOUS at 14:50

## 2019-10-12 RX ADMIN — UMECLIDINIUM 1 PUFF: 62.5 AEROSOL, POWDER ORAL at 09:00

## 2019-10-12 RX ADMIN — ALBUTEROL SULFATE 1 PUFF: 90 AEROSOL, METERED RESPIRATORY (INHALATION) at 09:02

## 2019-10-12 RX ADMIN — Medication 10 ML: at 00:36

## 2019-10-12 RX ADMIN — LIDOCAINE: 50 OINTMENT TOPICAL at 22:59

## 2019-10-12 NOTE — PROGRESS NOTES
Attempted Eval of Pt, during quesitioning portion pt reported SOB and nauseated, provided EMIS bag but was not used, pt reported \"being done\" and wanted to go back to bed, assisted back to bed supine with caregiver present, OT eval will be attempted at later date.      Wilmar Renee  OTD,OTR/L

## 2019-10-12 NOTE — ROUTINE PROCESS
Bedside shift change report given to Mel Sierra RN (oncoming nurse) by Ina Guerra RN (offgoing nurse). Report included the following information SBAR, Kardex, Intake/Output, MAR and Cardiac Rhythm NSR. Visit Vitals /77 Pulse 98 Temp 98.1 °F (36.7 °C) Resp 20 Ht 4' 11\" (1.499 m) Wt 55.3 kg (121 lb 14.6 oz) SpO2 98% Breastfeeding? No  
BMI 24.62 kg/m² Ina Guerra RN

## 2019-10-12 NOTE — PROGRESS NOTES
Problem: Mobility Impaired (Adult and Pediatric)  Goal: *Acute Goals and Plan of Care (Insert Text)  Description  Physical Therapy Goals   Initiated 10/10/2019 and to be accomplished within 5 day(s)  1. Patient will move from supine <> sit with mod I in prep for out of bed activity and change of position. 2.  Patient will perform sit<> stand with RW/mod I in prep for transfers/ambulation. 3.  Patient will transfer from bed <> chair with mod I/RW for time up in chair for completion of ADL activity. 4.  Patient will ambulate 100+ feet with mod I/RW for improved functional mobility/safe discharge. 5.  Patient will ascend/descend flight of stairs with handrail(s) with mod I/S for home re-entry as needed. 10/12/2019 1456 by Christy Petty PTA  Outcome: Not Progressing Towards Goal   PHYSICAL THERAPY TREATMENT    Patient: Wilfrido Whiteside (62 y.o. female)  Date: 10/12/2019  Diagnosis: Sepsis ([facility] [A41.9]  Pyelonephritis [N12] Renal stone  Procedure(s) (LRB):  CYSTOSCOPY, RIGHT RETROGRADE PYELOGRAM WITH INTERPRETATION, RIGHT URETEROSCOPY LASER LITHOTRIPSY WITH HOLMIUM, STENT CHANGE WITH C-ARM, \"SPEC POP\" (Right) 4 Days Post-Op  Precautions: Fall   Chart, physical therapy assessment, plan of care and goals were reviewed. ASSESSMENT:  Pt is found in chair, post meal. Increase C/o nausea and demanding to be assisted to bed. Slight increase of assist required, compared to initial evaluation. Will advance activity as tolerated. Progression toward goals:  ?      Improving appropriately and progressing toward goals  ? Improving slowly and progressing toward goals  ? Not making progress toward goals      PLAN:  Patient continues to benefit from skilled intervention to address the above impairments. Continue treatment per established plan of care. Discharge Recommendations:   To Be Determined  Further Equipment Recommendations for Discharge:  rolling walker     SUBJECTIVE:   Patient stated I need to get back in the bed.     OBJECTIVE DATA SUMMARY:   Critical Behavior:  Neurologic State: Alert  Orientation Level: Appropriate for age, Oriented X4  Functional Mobility Training:  Bed Mobility:  Sit to Supine: Minimum assistance  Scooting: Minimum assistance  Transfers:  Sit to Stand: Minimum assistance;Contact guard assistance  Stand to Sit: Minimum assistance;Contact guard assistance  Bed to Chair: Minimum assistance  Balance:  Sitting: Intact  Standing: Impaired; With support  Standing - Static: Fair  Standing - Dynamic : Poor  Ambulation/Gait Training:  Distance (ft): 3 Feet (ft)  Assistive Device: Gait belt;Walker, rolling  Ambulation - Level of Assistance: Contact guard assistance  Gait Abnormalities: Decreased step clearance  Base of Support: Widened  Speed/Alyssa: Pace decreased (<100 feet/min)  Interventions: Safety awareness training;Verbal cues  Pain:  Pain Scale 1: Numeric (0 - 10)  Pain Intensity 1: 4  Pain Location 1: Hip;Back  Pain Orientation 1: Mid;Posterior  Pain Description 1: Aching  Pain Intervention(s) 1: Distraction; Environmental changes; Family Support;Position; Rest  Activity Tolerance:   Fair-  Please refer to the flowsheet for vital signs taken during this treatment. After treatment:   ? Patient left in no apparent distress sitting up in chair  ? Patient left in no apparent distress in bed  ? Call bell left within reach  ? Nursing notified  ? Caregiver present  ?  Bed alarm activated      Deb Valero PTA   Time Calculation: 10 mins

## 2019-10-12 NOTE — PROGRESS NOTES
Problem: Mobility Impaired (Adult and Pediatric)  Goal: *Acute Goals and Plan of Care (Insert Text)  Description  Physical Therapy Goals   Initiated 10/10/2019 and to be accomplished within 5 day(s)  1. Patient will move from supine <> sit with mod I in prep for out of bed activity and change of position. 2.  Patient will perform sit<> stand with RW/mod I in prep for transfers/ambulation. 3.  Patient will transfer from bed <> chair with mod I/RW for time up in chair for completion of ADL activity. 4.  Patient will ambulate 100+ feet with mod I/RW for improved functional mobility/safe discharge. 5.  Patient will ascend/descend flight of stairs with handrail(s) with mod I/S for home re-entry as needed. Outcome: Not Progressing Towards Goal     Pt refused PT due to:  ? Nausea/vomiting  ? Eating  ? Pain  ? Pt lethargic (Deeply sleeping)  ? Off Unit  Will f/u later, as pt's schedule allows. Thank you.   Jorge Burks, PTA

## 2019-10-12 NOTE — PROGRESS NOTES
PRN NEB TX GIVEN PER PATIENT REQUEST @ 1052. BS CTA BUT PATIENT NOW ON 2L NC WITH SPO2 91-92%.   NO CHANGE POST TX.

## 2019-10-12 NOTE — PROGRESS NOTES
Urology Progress Note    Subjective:     Daily Progress Note: 10/12/2019 1:45 PM    Wero Holt is doing okay. She is weak and tired and has nausea immediately after eating something. She refuses PT and her daughter states that she often does this in the past as well. She is easily short of breath with minimal exertion. Objective:     Visit Vitals  /65 (BP 1 Location: Right arm, BP Patient Position: At rest;Sitting)   Pulse (!) 101   Temp 98.4 °F (36.9 °C)   Resp 20   Ht 4' 11\" (1.499 m)   Wt 55.3 kg (121 lb 14.6 oz)   SpO2 93%   Breastfeeding?  No   BMI 24.62 kg/m²        Temp (24hrs), Av.2 °F (36.8 °C), Min:97.6 °F (36.4 °C), Max:99.1 °F (37.3 °C)      Intake and Output:  10/10 1901 - 10/12 0700  In: 240 [P.O.:240]  Out: 1300 [Urine:1200]  10/12 07 - 10/12 1900  In: 120 [P.O.:120]  Out: 200 [Urine:200]    Physical Exam:   Gen - NAD, conversational, cachectic at baseline  abd - soft, nd, nt  +left CVA tendernes  Ext - no edema      Lab/Data Review:  CMP:   Lab Results   Component Value Date/Time     10/12/2019 03:40 AM    K 3.6 10/12/2019 03:40 AM     10/12/2019 03:40 AM    CO2 22 10/12/2019 03:40 AM    AGAP 9 10/12/2019 03:40 AM    GLU 87 10/12/2019 03:40 AM    BUN 6 (L) 10/12/2019 03:40 AM    CREA 0.44 (L) 10/12/2019 03:40 AM    GFRAA >60 10/12/2019 03:40 AM    GFRNA >60 10/12/2019 03:40 AM    CA 8.1 (L) 10/12/2019 03:40 AM    MG 1.6 10/12/2019 03:40 AM    PHOS 2.6 10/12/2019 03:40 AM    ALB 2.6 (L) 10/12/2019 03:40 AM    TP 5.2 (L) 10/12/2019 03:40 AM    GLOB 2.6 10/12/2019 03:40 AM    AGRAT 1.0 10/12/2019 03:40 AM    SGOT 18 10/12/2019 03:40 AM    ALT 21 10/12/2019 03:40 AM     CBC:   Lab Results   Component Value Date/Time    WBC 12.8 10/12/2019 03:40 AM    HGB 9.5 (L) 10/12/2019 03:40 AM    HCT 29.2 (L) 10/12/2019 03:40 AM     10/12/2019 03:40 AM       Assessment/Plan:     Principal Problem:    Renal stone (10/7/2019)    Active Problems:    Pyelonephritis (2019) Sepsis ([facility] (10/8/2019)      Septic shock ([facility] (10/9/2019)      Malnutrition ([facility] (10/9/2019)          Plan:    Ambulate and work with PT - I implored her! Continue abx.   Erythromycin for gastroparesis and stop loperamide

## 2019-10-12 NOTE — PROGRESS NOTES
Hospitalist Progress Note    Patient: Hugh Betts MRN: [id number]  CSN: [id number]    YOB: 1950  Age: 71 y.o. Sex: female    DOA: 10/8/2019 LOS:  LOS: 4 days                Assessment and Plan:    Principal Problem:    Renal stone (10/7/2019)    Active Problems:    Pyelonephritis (8/31/2019)      Sepsis ([address] (10/8/2019)      Septic shock ([address] (10/9/2019)      Malnutrition ([address] (10/9/2019)        Septic shock : This has resolved, off pressors and doing well    So far cx negative      Pyelonephritis : on  Continue rocephin  Day 4. Repeat cultures are negative         Renal stone  Cystoscopy, right retrograde pyelogram with interpretation, right ureteroscopy with holmium laser lithotripsy and stent change  Managed per urology          Pain all over : will change to home med of vicodin . Percoset may be contrinbuting to the nausea. Leukocytosis  : starting to come down. Will recheck tomorrow      Need  Increase physical activity, pt/ot. Chief complaint:  septic shock, sepsis. Renal stone, pyelonephritis          Subjective:    Complains more of nausea        Review of systems:    General: No fevers or chills. Cardiovascular: No chest pain or pressure. No palpitations. Pulmonary: No shortness of breath. Gastrointestinal: No nausea, vomiting. Objective:    Vital signs/Intake and Output:  Visit Vitals  /76 (BP 1 Location: Left arm, BP Patient Position: At rest)   Pulse 92   Temp 97.8 °F (36.6 °C)   Resp 18   Ht 4' 11\" (1.499 m)   Wt 55.3 kg (121 lb 14.6 oz)   SpO2 96%   Breastfeeding? No   BMI 24.62 kg/m²     Current Shift:  10/12 0701 - 10/12 1900  In: -   Out: 200 [Urine:200]  Last three shifts:  10/10 1901 - 10/12 0700  In: 240 [P.O.:240]  Out: 1300 [Urine:1200]    Physical Exam:  General: NAD, AAOx3. Non-toxic. HEENT: NC/AT. PERRLA, EOMI.  MMM. Lungs: Nml inspection. CTA B/L. No wheezing, rales or rhonchi. Heart:  S1S2 RRR,  PMI mid 5th IC space.  No M/RG.  Abdomen: Soft, NT/ND.  BS+. No peritoneal signs. Extremities: No C/C/E. Psych:   Nml affect. Neurologic:  2-12 intact. Strength 5/5 throughout. Sensation symmetrical.          Labs: Results:       Chemistry Recent Labs     10/12/19  0340 10/10/19  0415 10/09/19  2115   GLU 87 77  --     144  --    K 3.6 3.5 4.1    118*  --    CO2 22 18*  --    BUN 6* 11  --    CREA 0.44* 0.55*  --    CA 8.1* 8.0*  --    AGAP 9 8  --    BUCR 14 20  --    *  --   --    TP 5.2*  --   --    ALB 2.6*  --   --    GLOB 2.6  --   --    AGRAT 1.0  --   --       CBC w/Diff Recent Labs     10/12/19  0340 10/10/19  0415   WBC 12.8 19.1*   RBC 3.48* 3.19*   HGB 9.5* 8.7*   HCT 29.2* 27.7*    205   GRANS 77* 84*   LYMPH 13* 8*   EOS 1 1      Cardiac Enzymes No results for input(s): CPK, CKND1, JOSE in the last 72 hours. No lab exists for component: CKRMB, TROIP   Coagulation No results for input(s): PTP, INR, APTT, INREXT in the last 72 hours. Lipid Panel No results found for: CHOL, CHOLPOCT, CHOLX, CHLST, CHOLV, 030938, HDL, HDLP, LDL, LDLC, DLDLP, 989268, VLDLC, VLDL, TGLX, TRIGL, TRIGP, TGLPOCT, CHHD, CHHDX   BNP No results for input(s): BNPP in the last 72 hours.    Liver Enzymes Recent Labs     10/12/19  0340   TP 5.2*   ALB 2.6*   *   SGOT 18      Thyroid Studies No results found for: T4, T3U, TSH, TSHEXT     Procedures/imaging: see electronic medical records for all procedures/Xrays and details which were not copied into this note but were reviewed prior to creation of Plan

## 2019-10-12 NOTE — PROGRESS NOTES
Problem: Falls - Risk of  Goal: *Absence of Falls  Description  Document Chaz Roberson Fall Risk and appropriate interventions in the flowsheet.   Outcome: Progressing Towards Goal  Note:   Fall Risk Interventions:  Mobility Interventions: Bed/chair exit alarm, Utilize walker, cane, or other assistive device, Patient to call before getting OOB, Assess mobility with egress test         Medication Interventions: Assess postural VS orthostatic hypotension, Patient to call before getting OOB, Bed/chair exit alarm, Teach patient to arise slowly    Elimination Interventions: Call light in reach, Bed/chair exit alarm, Toilet paper/wipes in reach, Toileting schedule/hourly rounds              Problem: Patient Education: Go to Patient Education Activity  Goal: Patient/Family Education  Outcome: Progressing Towards Goal     Problem: Patient Education: Go to Patient Education Activity  Goal: Patient/Family Education  Outcome: Progressing Towards Goal     Problem: Sepsis: Day 4  Goal: Activity/Safety  Outcome: Progressing Towards Goal  Goal: Consults, if ordered  Outcome: Progressing Towards Goal  Goal: Diagnostic Test/Procedures  Outcome: Progressing Towards Goal  Goal: Nutrition/Diet  Outcome: Progressing Towards Goal  Goal: Discharge Planning  Outcome: Progressing Towards Goal  Goal: Medications  Outcome: Progressing Towards Goal  Goal: Respiratory  Outcome: Progressing Towards Goal  Goal: Treatments/Interventions/Procedures  Outcome: Progressing Towards Goal  Goal: Psychosocial  Outcome: Progressing Towards Goal  Goal: *Oxygen saturation within defined limits  Outcome: Progressing Towards Goal  Goal: *Hemodynamically stable  Outcome: Progressing Towards Goal  Goal: *Vital signs within defined limits  Outcome: Progressing Towards Goal  Goal: *Tolerating diet  Outcome: Progressing Towards Goal  Goal: *Demonstrates progressive activity  Outcome: Progressing Towards Goal  Goal: *Fluid volume maintenance  Outcome: Progressing Towards Goal     Problem: Patient Education: Go to Patient Education Activity  Goal: Patient/Family Education  Outcome: Progressing Towards Goal     Josue Shelton RN

## 2019-10-12 NOTE — ROUTINE PROCESS
0700: received bedside shift report from Adele Hays RN and assumed care of the pt. Brought her a fresh ice water, updated white board, left bed in lowest position and call light within reach. 0900: pt refusing nystatin-triamcinolone ointment, states that she only wants it when it's necessary to change the dressing. Also refusing Irene Prashant states \"I'm not eating so I don't need that\" 1213: pt complaining of hip and back pain, provided PRN pain medication as well as incentive spirometry education. Daughter at bedside, bed in lowest position and call light within reach. 1430: pt complaining of nausea with small bouts of emesis, PRN IVPB phenergan ordered from pharmacy. 1500: pt continuing to refuse heparin, spoke with Dr. Daisha Garvey and made her aware, pt continues with SCD use. Will continue to offer and educate pt on importance of use of heparin. 1700: pt complaining of headache, supplied ice pack and a cold coke, as she had just had a dose of norco previously. Will continue to monitor pain. Bed in lowest position and call light within reach. Shift summary. Shift uneventful. No complaints of shortness of breath or chest pain. Call light within reach.   
 
Bartolome Syed RN

## 2019-10-13 LAB
ERYTHROCYTE [DISTWIDTH] IN BLOOD BY AUTOMATED COUNT: 16.7 % (ref 11.6–14.5)
HCT VFR BLD AUTO: 26.9 % (ref 35–45)
HGB BLD-MCNC: 8.9 G/DL (ref 12–16)
MAGNESIUM SERPL-MCNC: 1.7 MG/DL (ref 1.6–2.6)
MCH RBC QN AUTO: 27.5 PG (ref 24–34)
MCHC RBC AUTO-ENTMCNC: 33.1 G/DL (ref 31–37)
MCV RBC AUTO: 83 FL (ref 74–97)
PHOSPHATE SERPL-MCNC: 2.7 MG/DL (ref 2.5–4.9)
PLATELET # BLD AUTO: 189 K/UL (ref 135–420)
PMV BLD AUTO: 9.6 FL (ref 9.2–11.8)
RBC # BLD AUTO: 3.24 M/UL (ref 4.2–5.3)
WBC # BLD AUTO: 11.5 K/UL (ref 4.6–13.2)

## 2019-10-13 PROCEDURE — 74011250637 HC RX REV CODE- 250/637: Performed by: HOSPITALIST

## 2019-10-13 PROCEDURE — 36415 COLL VENOUS BLD VENIPUNCTURE: CPT

## 2019-10-13 PROCEDURE — 74011250636 HC RX REV CODE- 250/636: Performed by: UROLOGY

## 2019-10-13 PROCEDURE — 65660000000 HC RM CCU STEPDOWN

## 2019-10-13 PROCEDURE — 3331090001 HH PPS REVENUE CREDIT

## 2019-10-13 PROCEDURE — 84100 ASSAY OF PHOSPHORUS: CPT

## 2019-10-13 PROCEDURE — C9113 INJ PANTOPRAZOLE SODIUM, VIA: HCPCS | Performed by: INTERNAL MEDICINE

## 2019-10-13 PROCEDURE — 74011000250 HC RX REV CODE- 250: Performed by: UROLOGY

## 2019-10-13 PROCEDURE — 74011250637 HC RX REV CODE- 250/637: Performed by: INTERNAL MEDICINE

## 2019-10-13 PROCEDURE — 85027 COMPLETE CBC AUTOMATED: CPT

## 2019-10-13 PROCEDURE — 83735 ASSAY OF MAGNESIUM: CPT

## 2019-10-13 PROCEDURE — 74011250636 HC RX REV CODE- 250/636: Performed by: INTERNAL MEDICINE

## 2019-10-13 PROCEDURE — 3331090002 HH PPS REVENUE DEBIT

## 2019-10-13 PROCEDURE — 97530 THERAPEUTIC ACTIVITIES: CPT

## 2019-10-13 PROCEDURE — 74011000250 HC RX REV CODE- 250: Performed by: INTERNAL MEDICINE

## 2019-10-13 PROCEDURE — 94640 AIRWAY INHALATION TREATMENT: CPT

## 2019-10-13 PROCEDURE — 74011250637 HC RX REV CODE- 250/637: Performed by: UROLOGY

## 2019-10-13 PROCEDURE — 77010033678 HC OXYGEN DAILY

## 2019-10-13 PROCEDURE — 97116 GAIT TRAINING THERAPY: CPT

## 2019-10-13 RX ORDER — PANTOPRAZOLE SODIUM 40 MG/1
40 TABLET, DELAYED RELEASE ORAL
Status: DISCONTINUED | OUTPATIENT
Start: 2019-10-14 | End: 2019-10-15 | Stop reason: HOSPADM

## 2019-10-13 RX ADMIN — ERYTHROMYCIN 500 MG: 250 CAPSULE, DELAYED RELEASE PELLETS ORAL at 16:37

## 2019-10-13 RX ADMIN — NYSTATIN AND TRIAMCINOLONE ACETONIDE: 100000; 1 CREAM TOPICAL at 09:21

## 2019-10-13 RX ADMIN — ERYTHROMYCIN 500 MG: 250 CAPSULE, DELAYED RELEASE PELLETS ORAL at 09:59

## 2019-10-13 RX ADMIN — MULTIPLE VITAMINS W/ MINERALS TAB 1 TABLET: TAB at 09:59

## 2019-10-13 RX ADMIN — LEVOTHYROXINE SODIUM 87.5 MCG: 25 TABLET ORAL at 06:40

## 2019-10-13 RX ADMIN — NYSTATIN AND TRIAMCINOLONE ACETONIDE: 100000; 1 CREAM TOPICAL at 16:37

## 2019-10-13 RX ADMIN — BISACODYL 10 MG: 10 SUPPOSITORY RECTAL at 09:59

## 2019-10-13 RX ADMIN — ERYTHROMYCIN 500 MG: 250 CAPSULE, DELAYED RELEASE PELLETS ORAL at 20:48

## 2019-10-13 RX ADMIN — Medication 10 ML: at 20:57

## 2019-10-13 RX ADMIN — BUPROPION HYDROCHLORIDE 300 MG: 150 TABLET, EXTENDED RELEASE ORAL at 06:40

## 2019-10-13 RX ADMIN — POTASSIUM & SODIUM PHOSPHATES POWDER PACK 280-160-250 MG 2 PACKET: 280-160-250 PACK at 09:59

## 2019-10-13 RX ADMIN — HYDROCODONE BITARTRATE AND ACETAMINOPHEN 1 TABLET: 5; 325 TABLET ORAL at 12:19

## 2019-10-13 RX ADMIN — TEMAZEPAM 30 MG: 15 CAPSULE ORAL at 00:20

## 2019-10-13 RX ADMIN — UMECLIDINIUM 1 PUFF: 62.5 AEROSOL, POWDER ORAL at 09:20

## 2019-10-13 RX ADMIN — SODIUM CHLORIDE 50 ML/HR: 900 INJECTION, SOLUTION INTRAVENOUS at 09:18

## 2019-10-13 RX ADMIN — PANTOPRAZOLE SODIUM 40 MG: 40 INJECTION, POWDER, FOR SOLUTION INTRAVENOUS at 11:08

## 2019-10-13 RX ADMIN — NYSTATIN AND TRIAMCINOLONE ACETONIDE: 100000; 1 CREAM TOPICAL at 20:56

## 2019-10-13 RX ADMIN — Medication 10 ML: at 06:42

## 2019-10-13 RX ADMIN — MONTELUKAST 10 MG: 10 TABLET, FILM COATED ORAL at 20:48

## 2019-10-13 RX ADMIN — DOCUSATE SODIUM 100 MG: 100 CAPSULE, LIQUID FILLED ORAL at 09:59

## 2019-10-13 RX ADMIN — DULOXETINE 60 MG: 60 CAPSULE, DELAYED RELEASE ORAL at 09:59

## 2019-10-13 RX ADMIN — PANCRELIPASE LIPASE, PANCRELIPASE PROTEASE, PANCRELIPASE AMYLASE 1 CAPSULE: 10000; 32000; 42000 CAPSULE, DELAYED RELEASE ORAL at 09:59

## 2019-10-13 RX ADMIN — IPRATROPIUM BROMIDE AND ALBUTEROL SULFATE 3 ML: .5; 3 SOLUTION RESPIRATORY (INHALATION) at 19:33

## 2019-10-13 RX ADMIN — HYDROCODONE BITARTRATE AND ACETAMINOPHEN 1 TABLET: 5; 325 TABLET ORAL at 18:29

## 2019-10-13 RX ADMIN — LIDOCAINE: 50 OINTMENT TOPICAL at 16:37

## 2019-10-13 RX ADMIN — HEPARIN SODIUM 5000 UNITS: 5000 INJECTION INTRAVENOUS; SUBCUTANEOUS at 11:08

## 2019-10-13 RX ADMIN — DOCUSATE SODIUM 100 MG: 100 CAPSULE, LIQUID FILLED ORAL at 20:48

## 2019-10-13 RX ADMIN — CEFTRIAXONE 1 G: 1 INJECTION, POWDER, FOR SOLUTION INTRAMUSCULAR; INTRAVENOUS at 18:00

## 2019-10-13 RX ADMIN — Medication 10 ML: at 14:28

## 2019-10-13 RX ADMIN — PANCRELIPASE LIPASE, PANCRELIPASE PROTEASE, PANCRELIPASE AMYLASE 1 CAPSULE: 10000; 32000; 42000 CAPSULE, DELAYED RELEASE ORAL at 16:37

## 2019-10-13 RX ADMIN — HEPARIN SODIUM 5000 UNITS: 5000 INJECTION INTRAVENOUS; SUBCUTANEOUS at 18:00

## 2019-10-13 NOTE — PROGRESS NOTES
Problem: Falls - Risk of  Goal: *Absence of Falls  Description  Document Mikki Lyman Fall Risk and appropriate interventions in the flowsheet.   Outcome: Progressing Towards Goal  Note:   Fall Risk Interventions:  Mobility Interventions: Assess mobility with egress test, Bed/chair exit alarm, Communicate number of staff needed for ambulation/transfer, Strengthening exercises (ROM-active/passive), Utilize walker, cane, or other assistive device         Medication Interventions: Assess postural VS orthostatic hypotension, Bed/chair exit alarm, Patient to call before getting OOB, Teach patient to arise slowly    Elimination Interventions: Bed/chair exit alarm, Call light in reach, Patient to call for help with toileting needs, Toilet paper/wipes in reach, Toileting schedule/hourly rounds              Problem: Patient Education: Go to Patient Education Activity  Goal: Patient/Family Education  Outcome: Progressing Towards Goal     Problem: Patient Education: Go to Patient Education Activity  Goal: Patient/Family Education  Outcome: Progressing Towards Goal     Problem: Sepsis: Day 5  Goal: Off Pathway (Use only if patient is Off Pathway)  Outcome: Progressing Towards Goal  Goal: *Oxygen saturation within defined limits  Outcome: Progressing Towards Goal  Goal: *Vital signs within defined limits  Outcome: Progressing Towards Goal  Goal: *Tolerating diet  Outcome: Progressing Towards Goal  Goal: *Demonstrates progressive activity  Outcome: Progressing Towards Goal  Goal: *Discharge plan identified  Outcome: Progressing Towards Goal  Goal: Activity/Safety  Outcome: Progressing Towards Goal  Goal: Consults, if ordered  Outcome: Progressing Towards Goal  Goal: Diagnostic Test/Procedures  Outcome: Progressing Towards Goal  Goal: Nutrition/Diet  Outcome: Progressing Towards Goal  Goal: Discharge Planning  Outcome: Progressing Towards Goal  Goal: Medications  Outcome: Progressing Towards Goal  Goal: Respiratory  Outcome: Progressing Towards Goal  Goal: Treatments/Interventions/Procedures  Outcome: Progressing Towards Goal  Goal: Psychosocial  Outcome: Progressing Towards Goal     Problem: Patient Education: Go to Patient Education Activity  Goal: Patient/Family Education  Outcome: Progressing Towards Goal     Problem: Pressure Injury - Risk of  Goal: *Prevention of pressure injury  Description  Document Cheng Scale and appropriate interventions in the flowsheet. Outcome: Progressing Towards Goal  Note:   Pressure Injury Interventions:       Moisture Interventions: Absorbent underpads, Check for incontinence Q2 hours and as needed, Offer toileting Q_hr    Activity Interventions: Pressure redistribution bed/mattress(bed type), Increase time out of bed, PT/OT evaluation    Mobility Interventions: Pressure redistribution bed/mattress (bed type), PT/OT evaluation, Turn and reposition approx.  every two hours(pillow and wedges)    Nutrition Interventions: Document food/fluid/supplement intake, Offer support with meals,snacks and hydration    Friction and Shear Interventions: Minimize layers, HOB 30 degrees or less, Apply protective barrier, creams and emollients                Problem: Patient Education: Go to Patient Education Activity  Goal: Patient/Family Education  Outcome: Progressing Towards Goal     Capo Urrutia RN

## 2019-10-13 NOTE — PROGRESS NOTES
1925: Late Entry: Assumed patient care from [address] [place] [address] Rd., [address]. Patient is alert and oriented to person, place, time and situation. Respiratory status Is stable on room air. Vital signs are stable. MEWS score is a one. Patient denies any pain, discomfort, nausea vomiting dizziness or anxiety. White board and fall card is updated. Bed is locked and in lowest position. Call bell, water and personal belongings are within reach. Patient has no questions, comments or concerns after bedside shift report. 0700: Late Entry: Patient had an uneventful shift. Respiratory status, vital signs and MEWS score remained stable. Patient was resting quietly with no signs of distress noted. Bed locked and in lowest position. Call bell water and personal belongings were within reach. Patient had no questions, comments or concerns after bedside shift report.  Bedside report given to [facility] RAYLA

## 2019-10-13 NOTE — PROGRESS NOTES
Problem: Mobility Impaired (Adult and Pediatric)  Goal: *Acute Goals and Plan of Care (Insert Text)  Description  Physical Therapy Goals   Initiated 10/10/2019 and to be accomplished within 5 day(s)  1. Patient will move from supine <> sit with mod I in prep for out of bed activity and change of position. 2.  Patient will perform sit<> stand with RW/mod I in prep for transfers/ambulation. 3.  Patient will transfer from bed <> chair with mod I/RW for time up in chair for completion of ADL activity. 4.  Patient will ambulate 100+ feet with mod I/RW for improved functional mobility/safe discharge. 5.  Patient will ascend/descend flight of stairs with handrail(s) with mod I/S for home re-entry as needed. Outcome: Progressing Towards Goal   PHYSICAL THERAPY TREATMENT    Patient: Jennifer Anderson (83 y.o. female)  Date: 10/13/2019  Diagnosis: Sepsis ([address] [A41.9]  Pyelonephritis [N12] Renal stone  Procedure(s) (LRB):  CYSTOSCOPY, RIGHT RETROGRADE PYELOGRAM WITH INTERPRETATION, RIGHT URETEROSCOPY LASER LITHOTRIPSY WITH HOLMIUM, STENT CHANGE WITH C-ARM, \"SPEC POP\" (Right) 5 Days Post-Op  Precautions: Fall   Chart, physical therapy assessment, plan of care and goals were reviewed. ASSESSMENT:  Patient received pain medication before tx. Pt reporting pain 6/10 pre/post tx. Static sitting balance is Poor+ initially. Pt posterior leaning. Focused on anterior weight shifting. Pt then F with time. X3 STS with RW CGA/Min A. Pt amb 20' with RW Min A. Pt again, posterior leaning requiring manual A to maintain midline. Pt also presents with decrease tolerance to activity and fatigues quickly. Assisted pt back to bed in chair position. Cont POC. Progression toward goals:  ?      Improving appropriately and progressing toward goals  ? Improving slowly and progressing toward goals  ?       Not making progress toward goals and plan of care will be adjusted     PLAN:  Patient continues to benefit from skilled intervention to address the above impairments. Continue treatment per established plan of care. Discharge Recommendations:  Rehab  Further Equipment Recommendations for Discharge:  rolling walker     SUBJECTIVE:   Patient stated  I am leaking and it's painful     OBJECTIVE DATA SUMMARY:   Critical Behavior:  Neurologic State: Alert, Eyes open spontaneously  Orientation Level: Oriented X4, Appropriate for age    Functional Mobility Training:  Bed Mobility:    Supine to Sit: Contact guard assistance  Sit to Supine: Contact guard assistance  Scooting: Contact guard assistance    Transfers:  Sit to Stand: Minimum assistance;Contact guard assistance  Stand to Sit: Minimum assistance    Balance:  Sitting: Impaired  Sitting - Static: Fair (occasional)  Sitting - Dynamic: Fair (occasional)  Standing: Impaired; With support  Standing - Static: Fair  Standing - Dynamic : Poor  Ambulation/Gait Training:  Distance (ft): 20 Feet (ft)  Assistive Device: Walker, rolling;Gait belt  Ambulation - Level of Assistance: Minimal assistance  Gait Abnormalities: Decreased step clearance  Base of Support: Widened  Speed/Alyssa: Pace decreased (<100 feet/min)  Step Length: Right shortened;Left shortened  Swing Pattern: Left asymmetrical;Right asymmetrical  Interventions: Verbal cues; Tactile cues; Safety awareness training    Pain:  Pain Scale 1: Numeric (0 - 10)  Pain Intensity 1: 5  Pain Location 1: Abdomen  Pain Orientation 1: Medial;Anterior  Pain Description 1: Burning  Pain Intervention(s) 1: Medication (see MAR)  Activity Tolerance:   Fair     After treatment:   ? Patient left in no apparent distress sitting up in chair  ? Patient left in no apparent distress in bed  ? Call bell left within reach  ? Nursing notified  ? Caregiver present  ?  Bed alarm activated      Xin Cárdenas PTA   Time Calculation: 25 mins

## 2019-10-13 NOTE — PROGRESS NOTES
Hospitalist Progress Note    Patient: Royal Nam MRN: [id number]  CSN: [id number]    YOB: 1950  Age: 71 y.o. Sex: female    DOA: 10/8/2019 LOS:  LOS: 5 days                Assessment and Plan:    Principal Problem:    Renal stone (10/7/2019)    Active Problems:    Pyelonephritis (8/31/2019)      Sepsis ([address] (10/8/2019)      Septic shock ([address] (10/9/2019)      Malnutrition ([address] (10/9/2019)        Septic shock : This has resolved, off pressors and doing well    So far cx negative      Pyelonephritis : on  Continue rocephin  Day 5. Repeat cultures are negative          Renal stone  Cystoscopy, right retrograde pyelogram with interpretation, right ureteroscopy with holmium laser lithotripsy and stent change  Managed per urology          Pain all over : much better with change to vicodin. Nausea much improved as well  .     Leukocytosis  :still coming down. Will recheck tomorrow       Need  Increase physical activity, pt/ot.       Chief complaint:  septic shock, sepsis. Renal stone, pyelonephritis         Subjective:    Feels a little better./ Agrees to do some PT      Review of systems:    General: No fevers or chills. Cardiovascular: No chest pain or pressure. No palpitations. Pulmonary: No shortness of breath. Gastrointestinal: No nausea, vomiting. Objective:    Vital signs/Intake and Output:  Visit Vitals  /76   Pulse 95   Temp 98.1 °F (36.7 °C)   Resp 20   Ht 4' 11\" (1.499 m)   Wt 64.7 kg (142 lb 10.2 oz)   SpO2 91%   Breastfeeding? No   BMI 28.81 kg/m²     Current Shift:  No intake/output data recorded. Last three shifts:  10/11 1901 - 10/13 0700  In: 600 [P.O.:600]  Out: 950 [Urine:950]    Physical Exam:  General: NAD, AAOx3. Non-toxic. HEENT: NC/AT. PERRLA, EOMI.  MMM. Lungs: Nml inspection. CTA B/L. No wheezing, rales or rhonchi. Heart:  S1S2 RRR,  PMI mid 5th IC space. No M/RG. Abdomen: Soft, NT/ND.  BS+. No peritoneal signs. Extremities: No C/C/E.   Psych:   Nml affect. Neurologic:  2-12 intact. Strength 5/5 throughout. Sensation symmetrical.          Labs: Results:       Chemistry Recent Labs     10/12/19  0340   GLU 87      K 3.6      CO2 22   BUN 6*   CREA 0.44*   CA 8.1*   AGAP 9   BUCR 14   *   TP 5.2*   ALB 2.6*   GLOB 2.6   AGRAT 1.0      CBC w/Diff Recent Labs     10/13/19  0230 10/12/19  0340   WBC 11.5 12.8   RBC 3.24* 3.48*   HGB 8.9* 9.5*   HCT 26.9* 29.2*    193   GRANS  --  77*   LYMPH  --  13*   EOS  --  1      Cardiac Enzymes No results for input(s): CPK, CKND1, JOSE in the last 72 hours. No lab exists for component: CKRMB, TROIP   Coagulation No results for input(s): PTP, INR, APTT, INREXT in the last 72 hours. Lipid Panel No results found for: CHOL, CHOLPOCT, CHOLX, CHLST, CHOLV, 246259, HDL, HDLP, LDL, LDLC, DLDLP, 190889, VLDLC, VLDL, TGLX, TRIGL, TRIGP, TGLPOCT, CHHD, CHHDX   BNP No results for input(s): BNPP in the last 72 hours.    Liver Enzymes Recent Labs     10/12/19  0340   TP 5.2*   ALB 2.6*   *   SGOT 18      Thyroid Studies No results found for: T4, T3U, TSH, TSHEXT     Procedures/imaging: see electronic medical records for all procedures/Xrays and details which were not copied into this note but were reviewed prior to creation of Plan

## 2019-10-13 NOTE — PROGRESS NOTES
Urology Progress Note    Subjective:     Daily Progress Note: 10/13/2019 7:20 PM    Cynthia Quesada is doing better. She had a little BM yesterday. No nausea today. She still feels very weak. Objective:     Visit Vitals  /67 (BP 1 Location: Left arm, BP Patient Position: At rest)   Pulse 99   Temp 98.2 °F (36.8 °C)   Resp 20   Ht 4' 11\" (1.499 m)   Wt 64.7 kg (142 lb 10.2 oz)   SpO2 90%   Breastfeeding? No   BMI 28.81 kg/m²        Temp (24hrs), Av °F (36.7 °C), Min:97.7 °F (36.5 °C), Max:98.2 °F (36.8 °C)      Intake and Output:  10/12 0701 - 10/13 1900  In: 360 [P.O.:360]  Out: 350 [Urine:350]  No intake/output data recorded. Physical Exam:   NAD, conversational, cachectic  RRR  Breathing easy  Abd - soft, nd, + tendenress to superfical touch at fistula site with much redness, less CVA tenderness  Ext - no edema    Lab/Data Review:  BMP: No results found for: NA, K, CL, CO2, AGAP, GLU, BUN, CREA, GFRAA, GFRNA  CBC:   Lab Results   Component Value Date/Time    WBC 11.5 10/13/2019 02:30 AM    HGB 8.9 (L) 10/13/2019 02:30 AM    HCT 26.9 (L) 10/13/2019 02:30 AM     10/13/2019 02:30 AM       Assessment/Plan:     Principal Problem:    Renal stone (10/7/2019)    Active Problems:    Pyelonephritis (2019)      Sepsis ([address] (10/8/2019)      Septic shock ([address] (10/9/2019)      Malnutrition ([address] (10/9/2019)        Plan:    Continue abx.     Wean O2  Work with PT and determine disposition  Hydrate

## 2019-10-13 NOTE — ROUTINE PROCESS
Bedside shift change report given to Alise Yost RN (oncoming nurse) by Niels Mueller RN (offgoing nurse). Report included the following information SBAR, Kardex, Intake/Output, MAR and Cardiac Rhythm SR. Visit Vitals /67 (BP 1 Location: Left arm, BP Patient Position: At rest) Pulse 99 Temp 98.2 °F (36.8 °C) Resp 20 Ht 4' 11\" (1.499 m) Wt 64.7 kg (142 lb 10.2 oz) SpO2 90% Breastfeeding? No  
BMI 28.81 kg/m² Niels Mueller RN

## 2019-10-13 NOTE — ROUTINE PROCESS
0700: received bedside shift report from Michael Rodriguez RN (offgoing nurse) and assumed care of the pt. Updated white board, assessed current needs and left bed in lowest position and call light within reach. 0800: changed the abdominal dressing, as it had been draining more than yesterday and pt was picking at the site. Will continue to monitor. 0930: pt seeming much more open to the idea of eating today, ordered a breakfast tray to be sent up for her. Did not refuse any medications this morning. 1130: spoke with pt's daughter, she would very much like PT to get a more accurate weight on her mother when they get her up and out of bed today. Pt's daughter feels that the weight is inaccurate. Will keep an eye out for them. 1200: spoke with Myra Sheriff from PT, pt refusing to work with her currently due to burning pain from fistula drainage. Pt states \"the pain is an 8/10, it drains more after I eat\" dressing change provided and pain medication administered. 1400: PT got pt up to bedside chair, stated that her balance is not that great and that she is leaning posteriorly, a chair with a supportive back is needed if she is to sit up.  
 
1600: purposeful hourly rounding, changed pt's fistula dressing, assessed all other current needs, bed in lowest position and call light within reach  
 
**pt's cell phone missing will call down to laundry services to see if they found or if any has been reported found **  
 
**if pt gets up out of bed, please zero the bed and reweigh patient** 
 
1900:shift summary. Shift uneventful. No complaints of chest pain or shortness of breath.   
 
Monster Barbour, RN

## 2019-10-13 NOTE — PROGRESS NOTES
Pharmacy Dosing Services:  Protonix IV to PO    The pharmacist has determined that this patient meets P & T approved criteria for conversion from IV to oral therapy for the following medication:  Protonix 40 mg IV q24hrs to Protonix 40 mg PO Daily    The pharmacist will continue to monitor the patient's status and advise the physician if conversion back to IV therapy is recommended.     Signed Pamela Worrell [facility] Contact information:   [phone]

## 2019-10-13 NOTE — PROGRESS NOTES
Problem: Falls - Risk of  Goal: *Absence of Falls  Description  Document Federico Ramirez Fall Risk and appropriate interventions in the flowsheet. Outcome: Progressing Towards Goal  Note:   Fall Risk Interventions:  Mobility Interventions: Patient to call before getting OOB         Medication Interventions: Patient to call before getting OOB, Teach patient to arise slowly    Elimination Interventions: Call light in reach, Patient to call for help with toileting needs, Toileting schedule/hourly rounds              Problem: Patient Education: Go to Patient Education Activity  Goal: Patient/Family Education  Outcome: Progressing Towards Goal     Problem: Patient Education: Go to Patient Education Activity  Goal: Patient/Family Education  Outcome: Progressing Towards Goal     Problem: Sepsis: Day 5  Goal: *Oxygen saturation within defined limits  Outcome: Progressing Towards Goal  Goal: *Vital signs within defined limits  Outcome: Progressing Towards Goal  Goal: *Tolerating diet  Outcome: Progressing Towards Goal  Note:   Pt has poor intake but does attempt to eat.  Family has reported an increase since taking   Goal: *Demonstrates progressive activity  Outcome: Progressing Towards Goal  Goal: *Discharge plan identified  Outcome: Progressing Towards Goal  Goal: Activity/Safety  Outcome: Progressing Towards Goal  Goal: Consults, if ordered  Outcome: Progressing Towards Goal  Goal: Diagnostic Test/Procedures  Outcome: Progressing Towards Goal  Goal: Nutrition/Diet  Outcome: Progressing Towards Goal  Goal: Medications  Outcome: Progressing Towards Goal  Goal: Respiratory  Outcome: Progressing Towards Goal  Goal: Treatments/Interventions/Procedures  Outcome: Progressing Towards Goal  Goal: Psychosocial  Outcome: Progressing Towards Goal     Problem: Patient Education: Go to Patient Education Activity  Goal: Patient/Family Education  Outcome: Progressing Towards Goal     Problem: Pressure Injury - Risk of  Goal: *Prevention of pressure injury  Description  Document Cheng Scale and appropriate interventions in the flowsheet.   Outcome: Progressing Towards Goal  Note:   Pressure Injury Interventions:       Moisture Interventions: Offer toileting Q_hr, Minimize layers, Absorbent underpads    Activity Interventions: PT/OT evaluation, Pressure redistribution bed/mattress(bed type), Increase time out of bed    Mobility Interventions: HOB 30 degrees or less, Pressure redistribution bed/mattress (bed type), PT/OT evaluation    Nutrition Interventions: Document food/fluid/supplement intake    Friction and Shear Interventions: HOB 30 degrees or less, Minimize layers, Apply protective barrier, creams and emollients                Problem: Patient Education: Go to Patient Education Activity  Goal: Patient/Family Education  Outcome: Progressing Towards Goal

## 2019-10-13 NOTE — PROGRESS NOTES
Pt c/o of abd pain 6/10 at fistula site. Nurse treated per mar. At reassessment pt reported pain 6/10.    1025 spoke with Dr. Dagoberto Guajardo and received orders for lidocaine ointment and skin barrier ointment. Dressing has been changed continuously throughout the night.     0300 Pt refused heparin injection. Pt continues to wear SCD's.     0700 Bedside shift change report given to HI Gallegos RN (oncoming nurse) by Epifanio Shaver RN (offgoing nurse). Report included the following information SBAR and Kardex.

## 2019-10-14 ENCOUNTER — APPOINTMENT (OUTPATIENT)
Dept: GENERAL RADIOLOGY | Age: 69
DRG: 856 | End: 2019-10-14
Attending: HOSPITALIST
Payer: MEDICARE

## 2019-10-14 PROBLEM — J81.0 ACUTE PULMONARY EDEMA (HCC): Status: ACTIVE | Noted: 2019-10-14

## 2019-10-14 LAB
ANION GAP SERPL CALC-SCNC: 7 MMOL/L (ref 3–18)
BACTERIA SPEC CULT: NORMAL
BASOPHILS # BLD: 0.1 K/UL (ref 0–0.1)
BASOPHILS NFR BLD: 1 % (ref 0–2)
BUN SERPL-MCNC: 6 MG/DL (ref 7–18)
BUN/CREAT SERPL: 15 (ref 12–20)
CALCIUM SERPL-MCNC: 8.1 MG/DL (ref 8.5–10.1)
CHLORIDE SERPL-SCNC: 110 MMOL/L (ref 100–111)
CO2 SERPL-SCNC: 26 MMOL/L (ref 21–32)
CREAT SERPL-MCNC: 0.41 MG/DL (ref 0.6–1.3)
DIFFERENTIAL METHOD BLD: ABNORMAL
EOSINOPHIL # BLD: 0.4 K/UL (ref 0–0.4)
EOSINOPHIL NFR BLD: 3 % (ref 0–5)
ERYTHROCYTE [DISTWIDTH] IN BLOOD BY AUTOMATED COUNT: 17 % (ref 11.6–14.5)
GLUCOSE SERPL-MCNC: 91 MG/DL (ref 74–99)
HCT VFR BLD AUTO: 26.2 % (ref 35–45)
HGB BLD-MCNC: 8.5 G/DL (ref 12–16)
LYMPHOCYTES # BLD: 2.4 K/UL (ref 0.9–3.6)
LYMPHOCYTES NFR BLD: 23 % (ref 21–52)
MAGNESIUM SERPL-MCNC: 1.8 MG/DL (ref 1.6–2.6)
MCH RBC QN AUTO: 27 PG (ref 24–34)
MCHC RBC AUTO-ENTMCNC: 32.4 G/DL (ref 31–37)
MCV RBC AUTO: 83.2 FL (ref 74–97)
MONOCYTES # BLD: 1.1 K/UL (ref 0.05–1.2)
MONOCYTES NFR BLD: 10 % (ref 3–10)
NEUTS SEG # BLD: 6.7 K/UL (ref 1.8–8)
NEUTS SEG NFR BLD: 63 % (ref 40–73)
PHOSPHATE SERPL-MCNC: 2.9 MG/DL (ref 2.5–4.9)
PLATELET # BLD AUTO: 226 K/UL (ref 135–420)
PMV BLD AUTO: 10.2 FL (ref 9.2–11.8)
POTASSIUM SERPL-SCNC: 3.3 MMOL/L (ref 3.5–5.5)
RBC # BLD AUTO: 3.15 M/UL (ref 4.2–5.3)
SERVICE CMNT-IMP: NORMAL
SODIUM SERPL-SCNC: 143 MMOL/L (ref 136–145)
WBC # BLD AUTO: 10.6 K/UL (ref 4.6–13.2)

## 2019-10-14 PROCEDURE — 3331090002 HH PPS REVENUE DEBIT

## 2019-10-14 PROCEDURE — 3331090001 HH PPS REVENUE CREDIT

## 2019-10-14 PROCEDURE — 36415 COLL VENOUS BLD VENIPUNCTURE: CPT

## 2019-10-14 PROCEDURE — 71045 X-RAY EXAM CHEST 1 VIEW: CPT

## 2019-10-14 PROCEDURE — 74011000250 HC RX REV CODE- 250: Performed by: UROLOGY

## 2019-10-14 PROCEDURE — 85025 COMPLETE CBC W/AUTO DIFF WBC: CPT

## 2019-10-14 PROCEDURE — 80048 BASIC METABOLIC PNL TOTAL CA: CPT

## 2019-10-14 PROCEDURE — 65660000000 HC RM CCU STEPDOWN

## 2019-10-14 PROCEDURE — 77010033678 HC OXYGEN DAILY

## 2019-10-14 PROCEDURE — 74011250637 HC RX REV CODE- 250/637: Performed by: UROLOGY

## 2019-10-14 PROCEDURE — 74011250637 HC RX REV CODE- 250/637: Performed by: INTERNAL MEDICINE

## 2019-10-14 PROCEDURE — 84100 ASSAY OF PHOSPHORUS: CPT

## 2019-10-14 PROCEDURE — 74011250637 HC RX REV CODE- 250/637: Performed by: HOSPITALIST

## 2019-10-14 PROCEDURE — 83735 ASSAY OF MAGNESIUM: CPT

## 2019-10-14 PROCEDURE — 97116 GAIT TRAINING THERAPY: CPT

## 2019-10-14 PROCEDURE — 74011250636 HC RX REV CODE- 250/636: Performed by: UROLOGY

## 2019-10-14 PROCEDURE — 97166 OT EVAL MOD COMPLEX 45 MIN: CPT

## 2019-10-14 PROCEDURE — 77030011256 HC DRSG MEPILEX <16IN NO BORD MOLN -A

## 2019-10-14 PROCEDURE — 74011250636 HC RX REV CODE- 250/636: Performed by: INTERNAL MEDICINE

## 2019-10-14 RX ORDER — HYDROCODONE BITARTRATE AND ACETAMINOPHEN 5; 325 MG/1; MG/1
1 TABLET ORAL
Qty: 15 TAB | Refills: 0 | Status: SHIPPED | OUTPATIENT
Start: 2019-10-14 | End: 2019-10-17

## 2019-10-14 RX ORDER — FUROSEMIDE 20 MG/1
20 TABLET ORAL DAILY
Status: DISCONTINUED | OUTPATIENT
Start: 2019-10-14 | End: 2019-10-15 | Stop reason: HOSPADM

## 2019-10-14 RX ORDER — POTASSIUM CHLORIDE 20 MEQ/1
40 TABLET, EXTENDED RELEASE ORAL
Status: COMPLETED | OUTPATIENT
Start: 2019-10-14 | End: 2019-10-14

## 2019-10-14 RX ORDER — CEPHALEXIN 500 MG/1
500 CAPSULE ORAL 3 TIMES DAILY
Qty: 21 CAP | Refills: 0 | Status: SHIPPED | OUTPATIENT
Start: 2019-10-14 | End: 2019-10-21

## 2019-10-14 RX ORDER — FUROSEMIDE 20 MG/1
20 TABLET ORAL DAILY
Status: DISCONTINUED | OUTPATIENT
Start: 2019-10-14 | End: 2019-10-14

## 2019-10-14 RX ADMIN — SODIUM CHLORIDE 50 ML/HR: 900 INJECTION, SOLUTION INTRAVENOUS at 13:33

## 2019-10-14 RX ADMIN — LEVOTHYROXINE SODIUM 87.5 MCG: 25 TABLET ORAL at 06:37

## 2019-10-14 RX ADMIN — HEPARIN SODIUM 5000 UNITS: 5000 INJECTION INTRAVENOUS; SUBCUTANEOUS at 21:54

## 2019-10-14 RX ADMIN — NYSTATIN AND TRIAMCINOLONE ACETONIDE: 100000; 1 CREAM TOPICAL at 21:54

## 2019-10-14 RX ADMIN — CEFTRIAXONE 1 G: 1 INJECTION, POWDER, FOR SOLUTION INTRAMUSCULAR; INTRAVENOUS at 17:35

## 2019-10-14 RX ADMIN — Medication 10 ML: at 07:52

## 2019-10-14 RX ADMIN — DOCUSATE SODIUM 100 MG: 100 CAPSULE, LIQUID FILLED ORAL at 21:54

## 2019-10-14 RX ADMIN — PANTOPRAZOLE SODIUM 40 MG: 40 TABLET, DELAYED RELEASE ORAL at 06:38

## 2019-10-14 RX ADMIN — HYDROCODONE BITARTRATE AND ACETAMINOPHEN 1 TABLET: 5; 325 TABLET ORAL at 00:28

## 2019-10-14 RX ADMIN — FUROSEMIDE 20 MG: 20 TABLET ORAL at 17:35

## 2019-10-14 RX ADMIN — PANCRELIPASE LIPASE, PANCRELIPASE PROTEASE, PANCRELIPASE AMYLASE 1 CAPSULE: 10000; 32000; 42000 CAPSULE, DELAYED RELEASE ORAL at 13:29

## 2019-10-14 RX ADMIN — DULOXETINE 60 MG: 60 CAPSULE, DELAYED RELEASE ORAL at 08:13

## 2019-10-14 RX ADMIN — DOCUSATE SODIUM 100 MG: 100 CAPSULE, LIQUID FILLED ORAL at 08:13

## 2019-10-14 RX ADMIN — MONTELUKAST 10 MG: 10 TABLET, FILM COATED ORAL at 21:54

## 2019-10-14 RX ADMIN — HEPARIN SODIUM 5000 UNITS: 5000 INJECTION INTRAVENOUS; SUBCUTANEOUS at 13:28

## 2019-10-14 RX ADMIN — POTASSIUM CHLORIDE 40 MEQ: 20 TABLET, EXTENDED RELEASE ORAL at 17:35

## 2019-10-14 RX ADMIN — BUPROPION HYDROCHLORIDE 300 MG: 150 TABLET, EXTENDED RELEASE ORAL at 06:38

## 2019-10-14 RX ADMIN — NYSTATIN AND TRIAMCINOLONE ACETONIDE: 100000; 1 CREAM TOPICAL at 08:18

## 2019-10-14 RX ADMIN — PANCRELIPASE LIPASE, PANCRELIPASE PROTEASE, PANCRELIPASE AMYLASE 1 CAPSULE: 10000; 32000; 42000 CAPSULE, DELAYED RELEASE ORAL at 08:13

## 2019-10-14 RX ADMIN — UMECLIDINIUM 1 PUFF: 62.5 AEROSOL, POWDER ORAL at 09:00

## 2019-10-14 RX ADMIN — HYDROCODONE BITARTRATE AND ACETAMINOPHEN 1 TABLET: 5; 325 TABLET ORAL at 23:39

## 2019-10-14 RX ADMIN — BISACODYL 10 MG: 10 SUPPOSITORY RECTAL at 08:13

## 2019-10-14 RX ADMIN — Medication 10 ML: at 21:55

## 2019-10-14 RX ADMIN — HEPARIN SODIUM 5000 UNITS: 5000 INJECTION INTRAVENOUS; SUBCUTANEOUS at 02:25

## 2019-10-14 RX ADMIN — HYDROCODONE BITARTRATE AND ACETAMINOPHEN 1 TABLET: 5; 325 TABLET ORAL at 06:38

## 2019-10-14 RX ADMIN — MULTIPLE VITAMINS W/ MINERALS TAB 1 TABLET: TAB at 08:13

## 2019-10-14 RX ADMIN — NYSTATIN AND TRIAMCINOLONE ACETONIDE: 100000; 1 CREAM TOPICAL at 16:00

## 2019-10-14 NOTE — PROGRESS NOTES
20:50 Assessment completed. Lungs are clear bilat. Abd dsg changed per pt's request. Nystatin ointment applied along with 4x4's along & mepliex dsg applied. Abd is soft but tender with + BS. Voiding per bedpan w/o difficulty. 22:55 Shift assessment completed. See nsg flow sheet for details. 02:55 Reassessed with 0 changes noted. Abd dsg changed q1hr. 4x4's with paper tape r/t 0 more mepilex dsg's on floor. Voiding per bedpan w/o difficulty. 07:20 Bedside and Verbal shift change report given to Steven Brown RN (oncoming nurse) by Fidencio Salinas RN (offgoing nurse). Report included the following information SBAR.

## 2019-10-14 NOTE — PROGRESS NOTES
9600 [address] care of patient from [facility], RN    6205 Assessment completed, patient is alert and oriented x's 4, no c/o pain. NSR on the monitor with a HR in the 90's. Lung fields are clear throughout on 2L NC, 02 sat sustained in the low 90's with a nonproductive cough. Patient is tolerating a regular diet without any N/V or gastric distention. Abdominal wound remain intact with gauze and mepilex border. Patient is currently back in bed after ambulation the BR, voiding clear yellow urine, no s/s of any distress, VSS, call bell and personal belongings within reach, will continue to monitor    1741 Scheduled medications administered as ordered without any complications. Patient is currently sitting up in bed watching television, no s/s of any pain or distress, will continue to monitor. 2036 Bedside and Verbal shift change report given to Nola Anderson RN (oncoming nurse) by Rio Neil RN (offgoing nurse). Report included the following information SBAR, MAR, Accordion and Cardiac Rhythm NSR.

## 2019-10-14 NOTE — PROGRESS NOTES
Plan: SNF (awaiting accepting facility)    Chart reviewed, noting PT recommending rehab for pt. FOC offered, pt chose [facility], the [facility], and  [facility] for follow up; referrals placed with CMS. Pt will need UAI for SNF as she has Medicaid as secondary, pt agreeable, will follow up. Pt will not need level 2 screening. [phone]- at this time pt has been accepted to the [facility], attempting to reach pt in room, will follow up as time allows. Care Management Interventions  PCP Verified by CM: Yes  Palliative Care Criteria Met (RRAT>21 & CHF Dx)?: No  Transition of Care Consult (CM Consult): SNF  [address]: Yes  Physical Therapy Consult: Yes  Occupational Therapy Consult: Yes  Current Support Network:  Other(has roommates )  Confirm Follow Up Transport: Family  Plan discussed with Pt/Family/Caregiver: Yes  Freedom of Choice Offered: Yes  Discharge Location  Discharge Placement: Skilled nursing facility

## 2019-10-14 NOTE — PROGRESS NOTES
Problem: Falls - Risk of  Goal: *Absence of Falls  Description  Document Fouzia Julian Fall Risk and appropriate interventions in the flowsheet.   Outcome: Progressing Towards Goal  Note:   Fall Risk Interventions:  Mobility Interventions: Communicate number of staff needed for ambulation/transfer, Patient to call before getting OOB         Medication Interventions: Assess postural VS orthostatic hypotension, Patient to call before getting OOB, Teach patient to arise slowly    Elimination Interventions: Bed/chair exit alarm, Call light in reach, Patient to call for help with toileting needs

## 2019-10-14 NOTE — PROGRESS NOTES
0820 called to bedside by CNA. pts EJ was hanging on onlyb by tape. Canula intact pressure held and bandaid applied. Second iv site in writs infiltrated when attempted to flush. Justine Hearing at bedside starting IV now. Pt ate breakfast and took am medications voices no concers. Complete RN assessment dont at this time as per doc flow sheet. 0900 Fistula dressing changed    1130 fistula dressing saturated and changed again. 1319  fistula dressing saturated and changed again. [id number] Daughter called updates given. Apparently pts cell phone is still missing per daughter, manager made aware.      5534 Report to Naida Ortez RN

## 2019-10-14 NOTE — PROGRESS NOTES
Problem: Self Care Deficits Care Plan (Adult)  Goal: *Acute Goals and Plan of Care (Insert Text)  Description  Initial Occupational Therapy Goals (10/14/2019) Within 7 day(s):    1. Patient will perform grooming standing at sink with S/mod I x 5 minutes for increased independence with ADLs. 2. Patient will perform UB dressing with S/mod I for increased independence with ADLs. 3. Patient will perform LB dressing with S/mod I for increased independence with ADLs. 4. Patient will perform all aspects of toileting with S/mod I for increased independence in ADLs  5. Patient will independently apply energy conservation techniques with 1 verbal cue(s) for increased independence with ADLs. 6. Patient will utilize good body mechanics during ADLs with 1 verbal cue(s). 7. Patient will independently manage O2 tubing to safely ambulate to/from bathroom. Outcome: Progressing Towards Goal     OCCUPATIONAL THERAPY EVALUATION    Patient: Ellis Velasquez (14 y.o. female)  Date: 10/14/2019  Primary Diagnosis: Sepsis ([facility] [address] [A41.9]  Pyelonephritis [N12]  Procedure(s) (LRB):  CYSTOSCOPY, RIGHT RETROGRADE PYELOGRAM WITH INTERPRETATION, RIGHT URETEROSCOPY LASER LITHOTRIPSY WITH HOLMIUM, STENT CHANGE WITH C-ARM, \"SPEC POP\" (Right) 6 Days Post-Op   Precautions:   Fall  PLOF: Patient reports mod I for ADLs & mobility with Rollator; drives    ASSESSMENT :  Based on the objective data described below, the patient presents with mild deficits d/t decreased functional endurance and new use of O2 nc. Pt grossly S/SBA for ADLs w/ increased time. Pt w/ improved balance in standing w/ RW w/ walker height adjusted. Patient agreeable to sitting up s/p meal. Pt w/ minimal calories consumed and will be limited in strengthening and functional gains if not eating.     Education: Patient instructed on home safety, body mechanics for optimal respiratory effort, Energy Conservation/Work Simplification Techniques, adaptive strategies and adaptive dressing techniques including clothing modifications with patient verbalizing understanding at this time. Patient will benefit from skilled intervention to address the above impairments. Patient's rehabilitation potential is considered to be Fair  Factors which may influence rehabilitation potential include:   ? None noted  ? Mental ability/status  ? Medical condition  ? Home/family situation and support systems  ? Safety awareness  ? Pain tolerance/management  ? Other:      PLAN :  Recommendations and Planned Interventions:   ?               Self Care Training                  ? Therapeutic Activities  ? Functional Mobility Training   ? Cognitive Retraining  ? Therapeutic Exercises           ? Endurance Activities  ? Balance Training                    ? Neuromuscular Re-Education  ? Visual/Perceptual Training     ? Home Safety Training  ? Patient Education                   ? Family Training/Education  ? Other (comment):    Frequency/Duration: Patient will be followed by occupational therapy 1-2 times per day/1-3 days per week to address goals. Discharge Recommendations: Home Health  Further Equipment Recommendations for Discharge: To Be Determined (TBD) at next level of care       SUBJECTIVE:   Patient stated I can't depend on them.  (re: roommates; reports her daughter can drive her)    OBJECTIVE DATA SUMMARY:     Past Medical History:   Diagnosis Date    Asthma     Depression     Emphysema of lung ([address]     Gastroparesis     Hypothyroidism     Malabsorption     of fat    Pancreatic insufficiency     Restless leg     bilateral     Past Surgical History:   Procedure Laterality Date    ABDOMEN SURGERY PROC UNLISTED      HX HERNIA REPAIR      HX SMALL BOWEL RESECTION      intra aortic baloon pump    HX TUBAL LIGATION       Barriers to Learning/Limitations: None  Compensate with: visual, verbal, tactile, kinesthetic cues/model    Home Situation:   Home Situation  Home Environment: Private residence  # Steps to Enter: 1(threshold)  One/Two Story Residence: Two story  # of Interior Steps: 15  Interior Rails: Both  Lift Chair Available: No  Living Alone: No  Support Systems: Other (comments)(roommates that rent each room)  Patient Expects to be Discharged to[de-identified] Private residence  Current DME Used/Available at Home: Walker, rollator, [address] chair, Commode, bedside  Tub or Shower Type: Tub/Shower combination(shared; 1/2 bath on 1st)  ? Right hand dominant   ? Left hand dominant    Cognitive/Behavioral Status:  Neurologic State: Alert  Orientation Level: Oriented X4  Cognition: Follows commands  Safety/Judgement: Awareness of environment;Decreased insight into deficits; Decreased awareness of need for safety;Decreased awareness of need for assistance    Skin: appears grossly intact   Edema: No significant edema noted     Vision/Perceptual:      Wears glasses  Acuity: able to read menu; clock     Coordination: BUE  Coordination: Within functional limits  Fine Motor Skills-Upper: Left Intact; Right Intact(mild tremor)    Gross Motor Skills-Upper: Left Intact; Right Intact    Balance:  Sitting: Intact  Sitting - Static: Good (unsupported)  Sitting - Dynamic: Fair (occasional)  Standing: Impaired; With support  Standing - Static: Good  Standing - Dynamic : Fair    Strength: BUE  Strength: Generally decreased, functional    Tone & Sensation: BUE  Tone: Normal  Sensation: Intact    Range of Motion: BUE  AROM: Generally decreased, functional  PROM: Generally decreased, functional    Functional Mobility and Transfers for ADLs:  Bed Mobility:   Supervision with HOB elevated ~45 degrees    Transfers:  Sit to Stand: Contact guard assistance  Bed to Chair: Supervision; Adaptive equipment   Toilet Transfer : Supervision; Adaptive equipment   Assistive Device: Walker, rollator   Bathroom Mobility: Supervision/set up    ADL Assessment:   Feeding: Contact guard assistance(moderate spillage)    Oral Facial Hygiene/Grooming: Stand-by assistance;Supervision; Additional time    Bathing: Setup; Additional time    Upper Body Dressing: Setup    Lower Body Dressing: Contact guard assistance    Toileting: Supervision    ADL Intervention:  Feeding  Drink to Mouth: Set-up    Grooming  Washing Hands: Stand-by assistance    Lower Body Dressing Assistance  Socks: Set-up(ankle-over-knee)  Position Performed: Seated edge of bed    Cognitive Retraining  Problem Solving: Inductive reason; Identifying the task; Identifying the problem;General alternative solution;Deductive reason; Awareness of environment  Executive Functions: Executing cognitive plans  Safety/Judgement: Awareness of environment;Decreased insight into deficits; Decreased awareness of need for safety;Decreased awareness of need for assistance    Pain:  Pain level pre-treatment: 0/10   Pain level post-treatment: 0/10   Pain Intervention(s): Medication provided by Nursing (see MAR); Rest, Ice, Repositioning   Response to intervention: Nurse notified, See doc flow sheet    Activity Tolerance:   Fair-. Patient able to stand 1-2 minute(s). Patient able to complete ADLs with intermittent rest breaks. Patient limited by functional endurance, new O2 nc use. Please refer to the flowsheet for vital signs taken during this treatment. After treatment:   ? Patient left in no apparent distress sitting up in chair  ? Patient left in no apparent distress in bed  ? Call bell left within reach  ? Nursing notified  ? Caregiver present  ? Bed alarm activated    COMMUNICATION/EDUCATION:   ? Role of Occupational Therapy in the acute care setting  ? Home safety education was provided and the patient/caregiver indicated understanding. ? Patient/family have participated as able in goal setting and plan of care.   ? Patient/family agree to work toward stated goals and plan of care. ? Patient understands intent and goals of therapy, but is neutral about his/her participation. ? Patient is unable to participate in goal setting and plan of care. Thank you for this referral.  Skip Joiner  Time Calculation: 17 mins    Eval Complexity: History: HIGH Complexity : Extensive review of history including physical, cognitive and psychosocial history ; Examination: MEDIUM Complexity : 3-5 performance deficits relating to physical, cognitive , or psychosocial skils that result in activity limitations and / or participation restrictions; Decision Making:MEDIUM Complexity : Patient may present with comorbidities that affect occupational performnce.  Miniml to moderate modification of tasks or assistance (eg, physical or verbal ) with assesment(s) is necessary to enable patient to complete evaluation

## 2019-10-14 NOTE — PROGRESS NOTES
Problem: Mobility Impaired (Adult and Pediatric)  Goal: *Acute Goals and Plan of Care (Insert Text)  Description  Physical Therapy Goals   Initiated 10/10/2019 and to be accomplished within 5 day(s)  1. Patient will move from supine <> sit with mod I in prep for out of bed activity and change of position. 2.  Patient will perform sit<> stand with RW/mod I in prep for transfers/ambulation. 3.  Patient will transfer from bed <> chair with mod I/RW for time up in chair for completion of ADL activity. 4.  Patient will ambulate 100+ feet with mod I/RW for improved functional mobility/safe discharge. 5.  Patient will ascend/descend flight of stairs with handrail(s) with mod I/S for home re-entry as needed. Outcome: Progressing Towards Goal    PHYSICAL THERAPY TREATMENT    Patient: Wero Holt (52 y.o. female)  Date: 10/14/2019  Diagnosis: Sepsis ([address] [A41.9]  Pyelonephritis [N12] Renal stone  Procedure(s) (LRB):  CYSTOSCOPY, RIGHT RETROGRADE PYELOGRAM WITH INTERPRETATION, RIGHT URETEROSCOPY LASER LITHOTRIPSY WITH HOLMIUM, STENT CHANGE WITH C-ARM, \"SPEC POP\" (Right) 6 Days Post-Op  Precautions: Fall   Chart, physical therapy assessment, plan of care and goals were reviewed. ASSESSMENT:  Pt found up in chair at bedside upon PT arrival. Pain 2-3/10 pre/post session. Transfers with CGA and able to increase gt distance to 38ft with RW/min/CGA. Gait with slow, reciprocal pattern. O2 at 2.5 Lnc in place throughout session. Pt returned to chair and completed therapeutic ex LE's as noted below. Note, pt with multiple episodes of coughing and expectoration leading to breaks during session. Mild SOB with coughing. Care mgr Rincon Sox arrived and with pt at completion of session. All needs in reach and pt in NAD. Pt will benefit from continued PT to improve functional mobility including gait quality, safety and independence. Recommend rehab at discharge.     Progression toward goals:  ?      Improving appropriately and progressing toward goals  ? Improving slowly and progressing toward goals  ? Not making progress toward goals and plan of care will be adjusted     PLAN:  Patient continues to benefit from skilled intervention to address the above impairments. Continue treatment per established plan of care. Discharge Recommendations:  Rehab  Further Equipment Recommendations for Discharge:  N/A     SUBJECTIVE:   Patient reports feeling better than she did yesterday. OBJECTIVE DATA SUMMARY:   Critical Behavior:  Neurologic State: Alert, Eyes open spontaneously  Orientation Level: Oriented X4  Functional Mobility Training:  Transfers:  Sit to Stand: Contact guard assistance  Stand to Sit: Contact guard assistance  Balance:  Sitting: Intact  Sitting - Static: Good (unsupported)  Sitting - Dynamic: Fair (occasional)  Standing: Impaired; With support  Standing - Static: Good  Standing - Dynamic : Fair  Ambulation/Gait Training:  Distance (ft): 38 Feet (ft)  Assistive Device: Walker, rollator  Ambulation - Level of Assistance: Contact guard assistance;Minimal assistance  Gait Abnormalities: Decreased step clearance  Base of Support: Narrowed  Speed/Alyssa: Pace decreased (<100 feet/min)  Step Length: Right shortened;Left shortened  Interventions: Verbal cues; Tactile cues; Safety awareness training(for turn nego)  Therapeutic Exercises:   Seated: LAQ x 20 BLE's; marching x 10 B LE's  Pain:  Pain Scale 1: Numeric (0 - 10)  Pain Intensity 1: 3  Pain Location 1: Abdomen  Pain Orientation 1: Anterior;Mid  Pain Description 1: Burning  Activity Tolerance:   Fair   Please refer to the flowsheet for vital signs taken during this treatment. After treatment:   ? Patient left in no apparent distress sitting up in chair  ? Patient left in no apparent distress in bed  ? Call bell left within reach  ? Nursing notified  ? Care manager present-Sun  ?  Bed alarm activated      Dillon Salcedo, PT   Time Calculation: 14 mins

## 2019-10-14 NOTE — PROGRESS NOTES
Hospitalist Progress Note-critical care note     Patient: Shweta Car MRN: [id number]  CSN: [id number]    YOB: 1950  Age: 71 y.o. Sex: female    DOA: 10/8/2019 LOS:  LOS: 6 days            Chief complaint: septic shock, sepsis. Renal stone, pyelonephritis     Assessment/Plan         Hospital Problems  Date Reviewed: 10/8/2019          Codes Class Noted POA    Acute pulmonary edema ([facility] [address] ICD-10-CM: J81.0  ICD-9-CM: 518.4  10/14/2019 Unknown        Septic shock ([facility] [address] ICD-10-CM: A41.9, R65.21  ICD-9-CM: 038.9, 785.52, 995.92  10/9/2019 Unknown        Malnutrition ([facility] [address] ICD-10-CM: E46  ICD-9-CM: 263.9  10/9/2019 Unknown        Sepsis ([facility] ICD-10-CM: A41.9  ICD-9-CM: 038.9, 995.91  10/8/2019 Unknown        * (Principal) Renal stone ICD-10-CM: N20.0  ICD-9-CM: 592.0  10/7/2019 Yes        Pyelonephritis ICD-10-CM: N12  ICD-9-CM: 590.80  8/31/2019 Unknown              Septic shock   Resolved,   So far cx negative     pulm edema   From repeated cxr today   Will give lasix      Pyelonephritis   Continue rocephin    f/u so far negative     Renal stone  Cystoscopy, right retrograde pyelogram with interpretation, right ureteroscopy with holmium laser lithotripsy and stent change  Managed per primary team         Subjective: feel fine , Ok to go to rehab   rn :no acute issue       Review of systems:    General: No fevers or chills. Tired   Cardiovascular: No chest pain or pressure. No palpitations. Pulmonary: No shortness of breath. Gastrointestinal: No nausea, vomiting. Vital signs/Intake and Output:  Visit Vitals  /88 (BP 1 Location: Left arm, BP Patient Position: At rest;Supine)   Pulse 93   Temp 98.5 °F (36.9 °C)   Resp 18   Ht 4' 11\" (1.499 m)   Wt 53.6 kg (118 lb 2.7 oz)   SpO2 91%   Breastfeeding?  No   BMI 23.87 kg/m²     Current Shift:  10/14 0701 - 10/14 1900  In: 120 [P.O.:120]  Out: 400 [Urine:400]  Last three shifts:  10/12 1901 - 10/14 0700  In: 600 [I.V.:600]  Out: 400 [Urine:400]    Physical Exam:  General: WD, WN. Alert, cooperative, no acute distress    HEENT: NC, Atraumatic. PERRLA, anicteric sclerae. Lungs: CTA Bilaterally. No Wheezing/Rhonchi/Rales. Heart:  Regular  rhythm,  No murmur, No Rubs, No Gallops  Abdomen: Soft, Non distended, Non tender.  +Bowel sounds,   Extremities: No c/c/e  Psych:   Not anxious or agitated. Neurologic:  No acute neurological deficit. Labs: Results:       Chemistry Recent Labs     10/14/19  0405 10/12/19  0340   GLU 91 87    140   K 3.3* 3.6    109   CO2 26 22   BUN 6* 6*   CREA 0.41* 0.44*   CA 8.1* 8.1*   AGAP 7 9   BUCR 15 14   AP  --  238*   TP  --  5.2*   ALB  --  2.6*   GLOB  --  2.6   AGRAT  --  1.0      CBC w/Diff Recent Labs     10/14/19  0405 10/13/19  0230 10/12/19  0340   WBC 10.6 11.5 12.8   RBC 3.15* 3.24* 3.48*   HGB 8.5* 8.9* 9.5*   HCT 26.2* 26.9* 29.2*    189 193   GRANS 63  --  77*   LYMPH 23  --  13*   EOS 3  --  1      Cardiac Enzymes No results for input(s): CPK, CKND1, JOSE in the last 72 hours. No lab exists for component: CKRMB, TROIP   Coagulation No results for input(s): PTP, INR, APTT, INREXT, INREXT in the last 72 hours. Lipid Panel No results found for: CHOL, CHOLPOCT, CHOLX, CHLST, CHOLV, 194909, HDL, HDLP, LDL, LDLC, DLDLP, 027387, VLDLC, VLDL, TGLX, TRIGL, TRIGP, TGLPOCT, CHHD, CHHDX   BNP No results for input(s): BNPP in the last 72 hours.    Liver Enzymes Recent Labs     10/12/19  0340   TP 5.2*   ALB 2.6*   *   SGOT 18      Thyroid Studies No results found for: T4, T3U, TSH, TSHEXT, TSHEXT     Procedures/imaging: see electronic medical records for all procedures/Xrays and details which were not copied into this note but were reviewed prior to creation of Ana Baumgarten, MD

## 2019-10-14 NOTE — PROGRESS NOTES
Urology Progress Note    Subjective:     Daily Progress Note: 10/14/2019 7:13 AM    Jean-Paul Badillo is doing better. Her nausea is resolved. Her fistula is draining more than she likes, but she states this is not out of the ordinary and she has an appointment with a specialist.      Objective:     Visit Vitals  /78 (BP 1 Location: Left arm, BP Patient Position: At rest)   Pulse 99   Temp 98.2 °F (36.8 °C)   Resp 18   Ht 4' 11\" (1.499 m)   Wt 53.6 kg (118 lb 2.7 oz)   SpO2 93%   Breastfeeding? No   BMI 23.87 kg/m²        Temp (24hrs), Av.1 °F (36.7 °C), Min:98 °F (36.7 °C), Max:98.2 °F (36.8 °C)      Intake and Output:  10/12 1901 - 10/14 0700  In: 600 [I.V.:600]  Out: 400 [Urine:400]  No intake/output data recorded. Physical Exam:   NAD  RRR  Breathing easy  abd - soft, nd, nt  Minimal lright CVA tendenress  Ext - no edema      Lab/Data Review:  BMP:   Lab Results   Component Value Date/Time     10/14/2019 04:05 AM    K 3.3 (L) 10/14/2019 04:05 AM     10/14/2019 04:05 AM    CO2 26 10/14/2019 04:05 AM    AGAP 7 10/14/2019 04:05 AM    GLU 91 10/14/2019 04:05 AM    BUN 6 (L) 10/14/2019 04:05 AM    CREA 0.41 (L) 10/14/2019 04:05 AM    GFRAA >60 10/14/2019 04:05 AM    GFRNA >60 10/14/2019 04:05 AM     CBC:   Lab Results   Component Value Date/Time    WBC 10.6 10/14/2019 04:05 AM    HGB 8.5 (L) 10/14/2019 04:05 AM    HCT 26.2 (L) 10/14/2019 04:05 AM     10/14/2019 04:05 AM       Assessment/Plan:     Principal Problem:    Renal stone (10/7/2019)    Active Problems:    Pyelonephritis (2019)      Sepsis ([address] (10/8/2019)      Septic shock ([facility] (10/9/2019)      Malnutrition ([facility] [address] (10/9/2019)        Plan:    Wean O2  Discharge to rehab when assigned.     Mark Austin MD

## 2019-10-15 VITALS
DIASTOLIC BLOOD PRESSURE: 77 MMHG | HEIGHT: 59 IN | OXYGEN SATURATION: 93 % | HEART RATE: 85 BPM | WEIGHT: 115.96 LBS | TEMPERATURE: 98.8 F | RESPIRATION RATE: 18 BRPM | BODY MASS INDEX: 23.38 KG/M2 | SYSTOLIC BLOOD PRESSURE: 144 MMHG

## 2019-10-15 LAB
ANION GAP SERPL CALC-SCNC: 7 MMOL/L (ref 3–18)
BUN SERPL-MCNC: 5 MG/DL (ref 7–18)
BUN/CREAT SERPL: 10 (ref 12–20)
CALCIUM SERPL-MCNC: 8.3 MG/DL (ref 8.5–10.1)
CHLORIDE SERPL-SCNC: 107 MMOL/L (ref 100–111)
CO2 SERPL-SCNC: 27 MMOL/L (ref 21–32)
CREAT SERPL-MCNC: 0.5 MG/DL (ref 0.6–1.3)
GLUCOSE SERPL-MCNC: 98 MG/DL (ref 74–99)
MAGNESIUM SERPL-MCNC: 1.7 MG/DL (ref 1.6–2.6)
PHOSPHATE SERPL-MCNC: 3 MG/DL (ref 2.5–4.9)
POTASSIUM SERPL-SCNC: 4.5 MMOL/L (ref 3.5–5.5)
SODIUM SERPL-SCNC: 141 MMOL/L (ref 136–145)

## 2019-10-15 PROCEDURE — 74011250637 HC RX REV CODE- 250/637: Performed by: UROLOGY

## 2019-10-15 PROCEDURE — 3331090001 HH PPS REVENUE CREDIT

## 2019-10-15 PROCEDURE — 74011250637 HC RX REV CODE- 250/637: Performed by: INTERNAL MEDICINE

## 2019-10-15 PROCEDURE — 80048 BASIC METABOLIC PNL TOTAL CA: CPT

## 2019-10-15 PROCEDURE — 84100 ASSAY OF PHOSPHORUS: CPT

## 2019-10-15 PROCEDURE — 77010033678 HC OXYGEN DAILY

## 2019-10-15 PROCEDURE — 83735 ASSAY OF MAGNESIUM: CPT

## 2019-10-15 PROCEDURE — 74011250636 HC RX REV CODE- 250/636: Performed by: INTERNAL MEDICINE

## 2019-10-15 PROCEDURE — 74011250637 HC RX REV CODE- 250/637: Performed by: HOSPITALIST

## 2019-10-15 PROCEDURE — 36415 COLL VENOUS BLD VENIPUNCTURE: CPT

## 2019-10-15 PROCEDURE — 97530 THERAPEUTIC ACTIVITIES: CPT

## 2019-10-15 PROCEDURE — 3331090002 HH PPS REVENUE DEBIT

## 2019-10-15 PROCEDURE — 97116 GAIT TRAINING THERAPY: CPT

## 2019-10-15 RX ADMIN — DULOXETINE 60 MG: 60 CAPSULE, DELAYED RELEASE ORAL at 08:36

## 2019-10-15 RX ADMIN — BISACODYL 10 MG: 10 SUPPOSITORY RECTAL at 08:37

## 2019-10-15 RX ADMIN — NYSTATIN AND TRIAMCINOLONE ACETONIDE: 100000; 1 CREAM TOPICAL at 15:38

## 2019-10-15 RX ADMIN — PANCRELIPASE LIPASE, PANCRELIPASE PROTEASE, PANCRELIPASE AMYLASE 1 CAPSULE: 10000; 32000; 42000 CAPSULE, DELAYED RELEASE ORAL at 11:40

## 2019-10-15 RX ADMIN — Medication 10 ML: at 13:46

## 2019-10-15 RX ADMIN — LEVOTHYROXINE SODIUM 87.5 MCG: 25 TABLET ORAL at 08:04

## 2019-10-15 RX ADMIN — BUPROPION HYDROCHLORIDE 300 MG: 150 TABLET, EXTENDED RELEASE ORAL at 08:04

## 2019-10-15 RX ADMIN — UMECLIDINIUM 1 PUFF: 62.5 AEROSOL, POWDER ORAL at 08:38

## 2019-10-15 RX ADMIN — DOCUSATE SODIUM 100 MG: 100 CAPSULE, LIQUID FILLED ORAL at 08:37

## 2019-10-15 RX ADMIN — FUROSEMIDE 20 MG: 20 TABLET ORAL at 08:37

## 2019-10-15 RX ADMIN — HEPARIN SODIUM 5000 UNITS: 5000 INJECTION INTRAVENOUS; SUBCUTANEOUS at 10:51

## 2019-10-15 RX ADMIN — NYSTATIN AND TRIAMCINOLONE ACETONIDE: 100000; 1 CREAM TOPICAL at 08:37

## 2019-10-15 RX ADMIN — PANTOPRAZOLE SODIUM 40 MG: 40 TABLET, DELAYED RELEASE ORAL at 08:04

## 2019-10-15 RX ADMIN — PANCRELIPASE LIPASE, PANCRELIPASE PROTEASE, PANCRELIPASE AMYLASE 1 CAPSULE: 10000; 32000; 42000 CAPSULE, DELAYED RELEASE ORAL at 08:36

## 2019-10-15 RX ADMIN — Medication 10 ML: at 08:06

## 2019-10-15 RX ADMIN — MULTIPLE VITAMINS W/ MINERALS TAB 1 TABLET: TAB at 08:37

## 2019-10-15 RX ADMIN — HEPARIN SODIUM 5000 UNITS: 5000 INJECTION INTRAVENOUS; SUBCUTANEOUS at 03:47

## 2019-10-15 NOTE — ROUTINE PROCESS
0700: Received bedside shift report from Shobha Bradshaw RN (offgoing nurse), got pt up to the bathroom and back. Updated white board, bed in lowest position and call light within reach. 0900: purposeful hourly rounding, pt sitting up in bed watching tv and eating small amounts of breakfast intermittently. 1030: New IV inserted by Willis Hansen (medic) 22 in the left hand. 1100: Purposeful hourly rounding pt toileted and placed back in bed. Ambulated well with walker and did not seem as out of breath as she was this weekend. Titrated oxygen down from 2L/m to 1L/m. Will continue to monitor oxygen saturations. Bed in lowest position and call light within reach. 1200: received discharge orders, will begin working on discharge summary and education. 1500: spoke with Prieto Lopez RN from [facility] and gave a full report of the pt. Gave ample opportunity for all questions along with the most recent set of vitals. 1600: pt picked up by [facility], all belongings with pt. Pt did not complain of any chest pain or shortness of breath before discharge.   
 
Rui Daniel RN

## 2019-10-15 NOTE — ROUTINE PROCESS
1900 Bedside shift change report given to CAITLYN Sims RN (oncoming nurse) by Susan Bush RN (offgoing nurse). Report included the following information SBAR and Kardex. Pt had an uneventful night. 0700 Bedside shift change report given to HI Davey RN (oncoming nurse) by Shannan Horowitz. Rg Sims RN (offgoing nurse). Report included the following information SBAR, Kardex and MAR.

## 2019-10-15 NOTE — PROGRESS NOTES
NUTRITION FOLLOW-UP    RECOMMENDATIONS/PLAN:   - Continue w/ POC  -encourage PO intake >75%  -Monitor labs/lytes, BM, PO intake, skin integrity, wt, fluid status      NUTRITION ASSESSMENT:   Client Update: 71 yrs old Female with renal stone operation. Rapid response 10/8, enterocutaneous fistula, pyelonephritis, ureterolithiasis, pyelitis, sepsis, severe protein calorie malnutrition, nausea. Pt is awaiting placement. Pt previously not eating due to nausea and said she wasn't eating per nursing note. Past two days, pt has been more open to eating per nursing note. FOOD/NUTRITION INTAKE   Diet Order:  Regular  Supplements: none  Food Allergies: NKFA  Average PO Intake:      Patient Vitals for the past 100 hrs:   % Diet Eaten   10/15/19 0924 0 %   10/14/19 1006 50 %   10/12/19 1316 0 %   10/12/19 1009 0 %   10/11/19 1925 0 %      Pertinent Medications:  [x] Reviewed; zofran, imodium, synthroid, protonix, dulcolax, colace, lasix  Electrolyte Replacement Protocol: []K []Mg []PO4  Insulin:  []SSI  []Pre-meal   []Basal    []Drip  [x]None  Cultural/Mosque Food Preferences: None Identified    BIOCHEMICAL DATA & MEDICAL TESTS  Pertinent Labs:  [x] Reviewed; BUN-5, calcium-8.3 ANTHROPOMETRICS  Height: 4' 11\" (149.9 cm)       Weight: 52.6 kg (115 lb 15.4 oz)         BMI: 23.4 kg/m^2 normal weight (18.5%-24.9% BMI)   Adm Weight: 91 lbs                Weight change: +23 lbs x6 d  Adjusted Body Weight:n/a    NUTRITION-FOCUSED PHYSICAL ASSESSMENT  Skin: no PU    GI: +BM 10/8    NUTRITION PRESCRIPTION  Calories: 0700-6130 kcal/day based on 40-45 kcal/kg  Protein: 62-83 g/day based on 1.5-2 g/kg  Fluid: 3843-8239 ml/day based on 1 kcal/ml      NUTRITION DIAGNOSES:   1. Malnutrition related to inadequate oral intake as evidenced by NFPE and pt not eating per chart review    NUTRITION INTERVENTIONS:   INTERVENTIONS:        GOALS:  1. Continue w/ POC 1.  Encourage PO intake >50% by next review 3 days     LEARNING NEEDS (Diet, Supplementation, Food/Nutrient-Drug Interaction):   [] None Identified   [] Education provided/documented      Identified and patient: [] Declined   [x] Was not appropriate/indicated        NUTRITION MONITORING /EVALUATION:   Follow PO intake  Monitor wt  Monitor renal labs, electrolytes, fluid status  Monitor for additional supplement needs     Previous Recommendations Implemented: yes        Previous Goals Met:  no -pt not eating      [] Participated in Interdisciplinary Rounds    [x] Interdisciplinary Care Plan Reviewed  DISCHARGE NUTRITION RECOMMENDATIONS ADDRESSED:      [x] To be determined closer to discharge    NUTRITION RISK:           [x] At risk                        [] Not currently at risk        Will follow-up per policy.   Laura Valderrama 1

## 2019-10-15 NOTE — PROGRESS NOTES
Problem: Falls - Risk of  Goal: *Absence of Falls  Description  Document Devere Jeffers Fall Risk and appropriate interventions in the flowsheet. Outcome: Progressing Towards Goal  Note:   Fall Risk Interventions:  Mobility Interventions: Patient to call before getting OOB, Assess mobility with egress test, Utilize walker, cane, or other assistive device         Medication Interventions: Patient to call before getting OOB, Teach patient to arise slowly    Elimination Interventions: Call light in reach, Patient to call for help with toileting needs, Toileting schedule/hourly rounds              Problem: Patient Education: Go to Patient Education Activity  Goal: Patient/Family Education  Outcome: Progressing Towards Goal     Problem: Sepsis: Day 6  Goal: Off Pathway (Use only if patient is Off Pathway)  Outcome: Progressing Towards Goal  Goal: *Oxygen saturation within defined limits  Outcome: Progressing Towards Goal  Goal: *Vital signs within defined limits  Outcome: Progressing Towards Goal  Goal: *Tolerating diet  Outcome: Progressing Towards Goal  Goal: *Demonstrates progressive activity  Outcome: Progressing Towards Goal  Goal: Influenza immunization  Outcome: Progressing Towards Goal  Goal: *Pneumococcal immunization  Outcome: Progressing Towards Goal  Goal: Activity/Safety  Outcome: Progressing Towards Goal  Goal: Nutrition/Diet  Outcome: Progressing Towards Goal  Goal: Discharge Planning  Outcome: Progressing Towards Goal  Goal: Medications  Outcome: Progressing Towards Goal  Goal: Respiratory  Outcome: Progressing Towards Goal  Goal: Psychosocial  Outcome: Progressing Towards Goal     Problem: Patient Education: Go to Patient Education Activity  Goal: Patient/Family Education  Outcome: Progressing Towards Goal     Problem: Pressure Injury - Risk of  Goal: *Prevention of pressure injury  Description  Document Cheng Scale and appropriate interventions in the flowsheet.   Outcome: Progressing Towards Goal  Note: Pressure Injury Interventions:       Moisture Interventions: Absorbent underpads, Apply protective barrier, creams and emollients, Assess need for specialty bed, Minimize layers    Activity Interventions: Pressure redistribution bed/mattress(bed type)    Mobility Interventions: Pressure redistribution bed/mattress (bed type), PT/OT evaluation    Nutrition Interventions: Document food/fluid/supplement intake    Friction and Shear Interventions: HOB 30 degrees or less, Minimize layers                Problem: Patient Education: Go to Patient Education Activity  Goal: Patient/Family Education  Outcome: Progressing Towards Goal     Problem: Patient Education: Go to Patient Education Activity  Goal: Patient/Family Education  Outcome: Progressing Towards Goal

## 2019-10-15 NOTE — DISCHARGE SUMMARY
[address]    Name:  Tammy Edwards  MR#:   [id number]  :  1950  ACCOUNT #:  [de-identified]  ADMIT DATE:  10/08/2019  DISCHARGE DATE:  10/15/2019      DISCHARGE DIAGNOSES:  1. Septic shock due to obstructing kidney stone. 2.  Acute pulmonary edema, resolved. 3.  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 4.  Pyelonephritis, improved. 5.  Severe protein-calorie malnutrition. 6.  Renal stones status post cystoscopy, right retrograde pyelogram with interpretation, right ureteroscopy with laser lithotripsy and stent change. CONSULTANTS:  Tiana Dhillon MD, Urology. HOSPITAL SUMMARY:  This is a 66-year-old female well known to the medical service. She has had chronic kidney issues with stones in the past.  She was most recently here in the hospital in September, discharged after treatment for pyelitis and ureterolithiasis. She came back to the hospital on 10/08/2019. She was seen by Dr. Firman Boas the day prior. She had an 8-mm stone in the renal pelvis and had a stent placed urgently the week prior. Her urine was sent for culture. She was admitted for the procedure as noted above for her stones, placed on empiric antibiotics. Followed by Hospitalist Service here because of acute pulmonary edema. Seen in consultation also by Critical Care Pulmonology. She was treated for sepsis with shock, received fluid hydration, again developed some pulmonary edema. Due to that, echocardiogram was completed on 10/10/2019 showed a preserved EF of 51-55%. She has had no recurrent issue of shortness of breath. At this point, her metabolic panel is within normal limits. Her magnesium and potassium are within normal range. Her H and H is stable at 8.5 and 26.2. She is chronically anemic. Leukocytosis has resolved, platelets are stable at 226. Micro last noted to be positive on , that was Klebsiella with her last admission.   Urine micro now is no growth for 2 days and blood culture from 10/08/2019 is no growth for 6 days. She is being transitioned to oral antibiotics per Urology's request.  At this point, her chronic enterocutaneous fistula is draining the same amount it usually does. She keeps a bandage on that daily. She does have severe protein-calorie malnutrition present prior to admission. Today, the patient was awake and alert, in no acute distress. She has had breakfast.  She denies any new issues. No pain, nausea, vomiting, or diarrhea on the antibiotics. Blood pressure is 144/77, pulse 85, temperature 98.8, respiratory rate is 18, SaO2 is 93%. She has had a small bowel movement. She denies any dysuria or abdominal pain. Her lungs are clear. Cardiac exam within normal limits. Abdomen is soft, nontender. Fistula has a bandage in place. Lower extremities are without peripheral edema. PLAN:  Discharge to the skilled nursing facility for physical therapy and strengthening considering two hospitalizations in a short period of time. She has a do not resuscitate code status. Medications for discharge that are new include Keflex 500 mg three times daily for 7 days, Norco 5 mg one tablet every 6 hours as needed for severe pain, #15 have been dispensed. She is to continue her Tylenol Extra Strength every 4 hours as needed for pain, vitamin B12 1000 mcg daily, Advair 1 puff twice daily, Imodium as needed for diarrhea every 4 hours, DuoNeb q.4 h. as needed for shortness of breath and wheezing, Atarax 25 mg twice daily as needed for anxiety or itching, albuterol MDI 2 puffs every 4 hours as needed for wheezing, Wellbutrin  mg every morning, Cymbalta 60 mg daily, Synthroid 88 mcg daily, Creon 2 caps three times daily with meals, Singulair 4 mg daily, multivitamin once a day, nystatin cream to affected areas as needed, Requip 1 mg daily as needed for restless leg, and Incruse Ellipta 1 puff daily.   She is stable for release to the skilled nursing facility. Her diet instructions are regular. 35 minutes on discharge time today. Do Not Resuscitate code status. Follow up with Dr. Kvng Ramos in the office in 1 week.       Severo Lea, MD      [id number]  D:  10/15/2019 12:30  T:  10/15/2019 16:03  JOB #:  [id number]  CC:  MD Macario Nam NP

## 2019-10-15 NOTE — PROGRESS NOTES
Pt has been accepted to [facility], [facility]AI completed copy provided to patient and faxed to accepting facility, pt will need medical transport for safety to due requiring 02 per nasal cannula@ 1L. Transition of care to SNF: [facility]     Communication to Patient/Family:  Met with patient and family, and they are agreeable to the transition plan. SNF/Rehab Transition:  Patient has been accepted to [address] and meets criteria for admission. Patient will transported by medical transport and expected to leave at 4pm.    Communication to SNF/Rehab:  Bedside RN, Eloisa, has been notified to update the transition plan to the facility and call report [phone]    Discharge information has been updated on the AVS and sent via [facility] and/or CC link. Discharge instructions to be fax'd to facility at [phone]     Please include all hard scripts for controlled substances, med rec and dc summary, and AVS in packet. Please medicate for pain prior to dc if possible and needed to help offset delay when patient first arrives to facility. Reviewed and confirmed with facility, [facility] of , can manage the patient care needs for the following:     [employer] with (X) only those applicable:  Medication:  [x]Medications are available at the facility  []IV Antibiotics    [x]Controlled Substance  hard copies available sent.   []Weekly Labs    Equipment:  []CPAP/BiPAP  []Wound Vacuum  []Art or Urinary Device  []PICC/Central Line  []Nebulizer  []Ventilator    Treatment:  []Isolation (for MRSA, VRE, etc.)  []Surgical Drain Management  []Tracheostomy Care  []Dressing Changes  []Dialysis with transportation  []PEG Care  [x]Oxygen  []Daily Weights for Heart Failure    Dietary:  []Any diet limitations  []Tube Feedings   []Total Parenteral Management (TPN)    Financial Resources:  []Medicaid Application Completed    [x]UAI Completed and copy given to pt/family. []A screening has previously been completed. []Level II Completed    [] Private pay individual who will not become financially eligible for Medicaid within 6 months from admission to a [facility] facility. [] Individual refused to have screening conducted. []Medicaid Application Completed    []The screening denied because it was determined individual did not need/did not qualify for nursing facility level of care. [] Out of state residents seeking direct admission to a [address] facility. [] Individuals who are inpatients of an out of state hospital, or in state or out of state veterans/ hospital and seek direct admission to a [address] facility  [] Individuals who are pateints or residents of a state owned/operated facility that is licensed by Department of [employer]) and seek direct admission to [address] [place] [address]  [] A screening not required for enrollment in [address] [address] [address] services as set out in [address] [place] [address] 45-  [] [facility]) staff shall perform screenings of the [facility] clients. Advanced Care Plan:  []Surrogate Decision Maker of Care  []POA  []Communicated Code Status and copy sent. Other:   Care Management Interventions  PCP Verified by CM:  Yes  Palliative Care Criteria Met (RRAT>21 & CHF Dx)?: No  Mode of Transport at Discharge: S  Transition of Care Consult (CM Consult): SNF  [address]: No  Reason Outside [place]: Patient already serviced by other home care/hospice agency  Partner SNF: No  Discharge Durable Medical Equipment: No  Physical Therapy Consult: No  Occupational Therapy Consult: Yes  Current Support Network: Nursing Facility  Confirm Follow Up Transport: Self  Plan discussed with Pt/Family/Caregiver: Yes  Freedom of Choice Offered: Yes  Discharge Location  Discharge Placement: Skilled nursing facility

## 2019-10-15 NOTE — PROGRESS NOTES
Problem: Mobility Impaired (Adult and Pediatric)  Goal: *Acute Goals and Plan of Care (Insert Text)  Description  Physical Therapy Goals   Initiated 10/10/2019 and to be accomplished within 5 day(s)  1. Patient will move from supine <> sit with mod I in prep for out of bed activity and change of position. 2.  Patient will perform sit<> stand with RW/mod I in prep for transfers/ambulation. 3.  Patient will transfer from bed <> chair with mod I/RW for time up in chair for completion of ADL activity. 4.  Patient will ambulate 100+ feet with mod I/RW for improved functional mobility/safe discharge. 5.  Patient will ascend/descend flight of stairs with handrail(s) with mod I/S for home re-entry as needed. Outcome: Progressing Towards Goal   PHYSICAL THERAPY TREATMENT    Patient: Adela Mendez (67 y.o. female)  Date: 10/15/2019  Diagnosis: Sepsis ([address] [A41.9]  Pyelonephritis [N12] Renal stone  Procedure(s) (LRB):  CYSTOSCOPY, RIGHT RETROGRADE PYELOGRAM WITH INTERPRETATION, RIGHT URETEROSCOPY LASER LITHOTRIPSY WITH HOLMIUM, STENT CHANGE WITH C-ARM, \"SPEC POP\" (Right) 7 Days Post-Op  Precautions: Fall   Chart, physical therapy assessment, plan of care and goals were reviewed. ASSESSMENT:  Progression toward goals:  ?      Improving appropriately and progressing toward goals  ? Improving slowly and progressing toward goals  ? Not making progress toward goals and plan of care will be adjusted     PLAN:  Patient continues to benefit from skilled intervention to address the above impairments. Continue treatment per established plan of care.   Discharge Recommendations:  Rehab  Further Equipment Recommendations for Discharge:  rolling walker     SUBJECTIVE:   Patient stated  I have to get dressed      OBJECTIVE DATA SUMMARY:   Critical Behavior:  Neurologic State: Alert  Orientation Level: Oriented X4  Cognition: Appropriate decision making, Appropriate for age attention/concentration, Appropriate safety awareness, Recognition of people/places, Follows commands  Safety/Judgement: Awareness of environment, Decreased insight into deficits, Decreased awareness of need for safety, Decreased awareness of need for assistance  Functional Mobility Training:  Bed Mobility:  Sit to Supine: Stand-by assistance    Transfers:  Sit to Stand: Contact guard assistance  Stand to Sit: Contact guard assistance    Balance:  Sitting: Intact  Sitting - Static: Good (unsupported)  Sitting - Dynamic: Fair (occasional)  Standing: Impaired; With support  Standing - Static: Good  Standing - Dynamic : Fair  Ambulation/Gait Training:  Distance (ft): 40 Feet (ft)  Assistive Device: Walker, rolling;Gait belt  Ambulation - Level of Assistance: Contact guard assistance  Gait Abnormalities: Decreased step clearance  Base of Support: Narrowed  Speed/Alyssa: Pace decreased (<100 feet/min)  Step Length: Left shortened;Right shortened  Interventions: Verbal cues; Safety awareness training    Pain:  Pain Scale 1: Numeric (0 - 10)  Pain Intensity 1: 0    Activity Tolerance:   Fair     After treatment:   ? Patient left in no apparent distress sitting up in chair  ? Patient left in no apparent distress in bed  ? Call bell left within reach  ? Nursing notified  ? Caregiver present  ?  Bed alarm activated      Kenya Rush PTA   Time Calculation: 24 mins

## 2019-10-16 PROCEDURE — 3331090002 HH PPS REVENUE DEBIT

## 2019-10-16 PROCEDURE — 3331090001 HH PPS REVENUE CREDIT

## 2019-10-17 PROCEDURE — 3331090001 HH PPS REVENUE CREDIT

## 2019-10-17 PROCEDURE — 3331090002 HH PPS REVENUE DEBIT

## 2019-10-18 LAB
CA PHOS MFR STONE: 10 %
CALCIUM OXALATE DIHYDRATE MFR STONE IR: 10 %
COLOR STONE: NORMAL
COM MFR STONE: 80 %
COMMENT, 519994: NORMAL
COMPOSITION, 120440: NORMAL
DISCLAIMER, STO32L: NORMAL
NIDUS STONE QL: NORMAL
SIZE STONE: NORMAL MM
SURFACE CRYSTALS, 120439: NORMAL
WT STONE: 18.5 MG

## 2019-10-18 PROCEDURE — 3331090001 HH PPS REVENUE CREDIT

## 2019-10-18 PROCEDURE — 3331090002 HH PPS REVENUE DEBIT

## 2019-10-19 PROCEDURE — 3331090002 HH PPS REVENUE DEBIT

## 2019-10-19 PROCEDURE — 3331090001 HH PPS REVENUE CREDIT

## 2019-10-20 PROCEDURE — 3331090002 HH PPS REVENUE DEBIT

## 2019-10-20 PROCEDURE — 3331090001 HH PPS REVENUE CREDIT

## 2019-10-21 PROCEDURE — 3331090002 HH PPS REVENUE DEBIT

## 2019-10-21 PROCEDURE — 3331090001 HH PPS REVENUE CREDIT

## 2019-10-22 PROCEDURE — 3331090001 HH PPS REVENUE CREDIT

## 2019-10-22 PROCEDURE — 3331090002 HH PPS REVENUE DEBIT

## 2019-10-23 ENCOUNTER — PATIENT OUTREACH (OUTPATIENT)
Dept: CASE MANAGEMENT | Age: 69
End: 2019-10-23

## 2019-10-23 PROCEDURE — 3331090001 HH PPS REVENUE CREDIT

## 2019-10-23 PROCEDURE — 3331090002 HH PPS REVENUE DEBIT

## 2019-10-23 NOTE — PROGRESS NOTES
Community Care Team Documentation for Patient in [facility]     Patient discharged from [address]  to [facility], on 10/15/19.      Nico Labs:     1. Septic shock due to obstructing kidney stone. 2.  Acute pulmonary edema, resolved. 3.  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 4.  Pyelonephritis, improved. 5.  Severe protein-calorie malnutrition. 6.  Renal stones status post cystoscopy, right retrograde pyelogram with interpretation, right ureteroscopy with laser lithotripsy and stent change    SNF Attending Provider:       Anticipated discharge date from SNF:  TBD    PCP : Gisel Funes NP    Nurse Navigator:     [facility] Team rounds completed, updates provided by facility. Brief Summary of Care:    Patient receiving PT/OT. Patient asking to be d/c by 10/29 - has appt with [facility] on 10/29 and would like to attend the appt if possible. Dispo - will return home , no safety concerns per staff - daughter lives nearby and patient has a room mate. RRAT:  Low Risk            10       Total Score        3 Has Seen PCP in Last 6 Months (Yes=3, No=0)    4 IP Visits Last 12 Months (1-3=4, 4=9, >4=11)    3 Charlson Comorbidity Score (Age + Comorbid Conditions)        Criteria that do not apply:    . Living with Significant Other. Assisted Living. LTAC. SNF. or   Rehab    Patient Length of Stay (>5 days = 3)    Pt.  Coverage (Medicare=5 , Medicaid, or Self-Pay=4)        Active Ambulatory Problems     Diagnosis Date Noted    Enterocutaneous fistula 05/15/2019    Pyelonephritis 08/31/2019    Ureterolithiasis 09/25/2019    Pyelitis 09/25/2019    Renal stone 10/07/2019    Sepsis ([address] 10/08/2019    Septic shock ([address] 10/09/2019    Malnutrition ([address] 10/09/2019    Acute pulmonary edema ([address] 10/14/2019     Resolved Ambulatory Problems     Diagnosis Date Noted    No Resolved Ambulatory Problems     Past Medical History:   Diagnosis Date    Asthma     Depression     Emphysema of lung (HCC)     Gastroparesis     Hypothyroidism     Malabsorption     Pancreatic insufficiency     Restless leg

## 2019-10-24 PROCEDURE — 3331090002 HH PPS REVENUE DEBIT

## 2019-10-24 PROCEDURE — 3331090001 HH PPS REVENUE CREDIT

## 2019-10-25 PROCEDURE — 3331090001 HH PPS REVENUE CREDIT

## 2019-10-25 PROCEDURE — 3331090002 HH PPS REVENUE DEBIT

## 2019-10-26 PROCEDURE — 3331090002 HH PPS REVENUE DEBIT

## 2019-10-26 PROCEDURE — 3331090001 HH PPS REVENUE CREDIT

## 2019-10-27 PROCEDURE — 3331090001 HH PPS REVENUE CREDIT

## 2019-10-27 PROCEDURE — 3331090002 HH PPS REVENUE DEBIT

## 2019-10-28 ENCOUNTER — HOME CARE VISIT (OUTPATIENT)
Dept: SCHEDULING | Facility: HOME HEALTH | Age: 69
End: 2019-10-28
Payer: MEDICARE

## 2019-10-28 VITALS
HEART RATE: 88 BPM | OXYGEN SATURATION: 96 % | RESPIRATION RATE: 14 BRPM | SYSTOLIC BLOOD PRESSURE: 107 MMHG | TEMPERATURE: 97.1 F | DIASTOLIC BLOOD PRESSURE: 75 MMHG

## 2019-10-28 PROCEDURE — 3331090002 HH PPS REVENUE DEBIT

## 2019-10-28 PROCEDURE — 3331090001 HH PPS REVENUE CREDIT

## 2019-10-28 PROCEDURE — G0299 HHS/HOSPICE OF RN EA 15 MIN: HCPCS

## 2019-10-29 ENCOUNTER — HOME CARE VISIT (OUTPATIENT)
Dept: HOME HEALTH SERVICES | Facility: HOME HEALTH | Age: 69
End: 2019-10-29
Payer: MEDICARE

## 2019-10-29 PROCEDURE — 3331090002 HH PPS REVENUE DEBIT

## 2019-10-29 PROCEDURE — 3331090001 HH PPS REVENUE CREDIT

## 2019-10-30 ENCOUNTER — PATIENT OUTREACH (OUTPATIENT)
Dept: CASE MANAGEMENT | Age: 69
End: 2019-10-30

## 2019-10-30 PROCEDURE — 3331090002 HH PPS REVENUE DEBIT

## 2019-10-30 PROCEDURE — 3331090001 HH PPS REVENUE CREDIT

## 2019-10-30 NOTE — PROGRESS NOTES
Community Care Team Documentation for Patient in [facility]     Patient discharged from [id number]  to [facility], on 10/15/19.      [place]:     1. Septic shock due to obstructing kidney stone. 2.  Acute pulmonary edema, resolved. 3.  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 4.  Pyelonephritis, improved. 5.  Severe protein-calorie malnutrition. 6.  Renal stones status post cystoscopy, right retrograde pyelogram with interpretation, right ureteroscopy with laser lithotripsy and stent change    SNF Attending Provider:       Anticipated discharge date from SNF:  TBD    PCP : Jose Daniel Ewing NP    Nurse Navigator:     [facility] Team rounds completed, updates provided by facility. Brief Summary of Care:    Patient receiving PT/OT. Has chosen [facility] for when d/c. Dispo - will return home , no safety concerns per staff - daughter lives nearby and patient has a room mate. RRAT:  Low Risk            10       Total Score        3 Has Seen PCP in Last 6 Months (Yes=3, No=0)    4 IP Visits Last 12 Months (1-3=4, 4=9, >4=11)    3 Charlson Comorbidity Score (Age + Comorbid Conditions)        Criteria that do not apply:    . Living with Significant Other. Assisted Living. LTAC. SNF. or   Rehab    Patient Length of Stay (>5 days = 3)    Pt.  Coverage (Medicare=5 , Medicaid, or Self-Pay=4)        Active Ambulatory Problems     Diagnosis Date Noted    Enterocutaneous fistula 05/15/2019    Pyelonephritis 08/31/2019    Ureterolithiasis 09/25/2019    Pyelitis 09/25/2019    Renal stone 10/07/2019    Sepsis ([address] 10/08/2019    Septic shock ([address] 10/09/2019    Malnutrition ([address] 10/09/2019    Acute pulmonary edema ([address] 10/14/2019     Resolved Ambulatory Problems     Diagnosis Date Noted    No Resolved Ambulatory Problems     Past Medical History:   Diagnosis Date    Asthma     Depression     Emphysema of lung (HCC)     Gastroparesis     Hypothyroidism     Malabsorption     Pancreatic insufficiency     Restless leg

## 2019-10-31 ENCOUNTER — HOME CARE VISIT (OUTPATIENT)
Dept: SCHEDULING | Facility: HOME HEALTH | Age: 69
End: 2019-10-31
Payer: MEDICARE

## 2019-10-31 VITALS
RESPIRATION RATE: 16 BRPM | SYSTOLIC BLOOD PRESSURE: 98 MMHG | DIASTOLIC BLOOD PRESSURE: 73 MMHG | OXYGEN SATURATION: 97 % | TEMPERATURE: 98 F | HEART RATE: 88 BPM

## 2019-10-31 VITALS
RESPIRATION RATE: 14 BRPM | OXYGEN SATURATION: 99 % | DIASTOLIC BLOOD PRESSURE: 63 MMHG | SYSTOLIC BLOOD PRESSURE: 119 MMHG | HEART RATE: 80 BPM | TEMPERATURE: 98 F

## 2019-10-31 PROCEDURE — 3331090001 HH PPS REVENUE CREDIT

## 2019-10-31 PROCEDURE — G0152 HHCP-SERV OF OT,EA 15 MIN: HCPCS

## 2019-10-31 PROCEDURE — G0495 RN CARE TRAIN/EDU IN HH: HCPCS

## 2019-10-31 PROCEDURE — G0151 HHCP-SERV OF PT,EA 15 MIN: HCPCS

## 2019-10-31 PROCEDURE — 3331090002 HH PPS REVENUE DEBIT

## 2019-10-31 PROCEDURE — A6216 NON-STERILE GAUZE<=16 SQ IN: HCPCS

## 2019-11-01 ENCOUNTER — HOME CARE VISIT (OUTPATIENT)
Dept: HOME HEALTH SERVICES | Facility: HOME HEALTH | Age: 69
End: 2019-11-01
Payer: MEDICARE

## 2019-11-01 VITALS
TEMPERATURE: 98.6 F | SYSTOLIC BLOOD PRESSURE: 108 MMHG | OXYGEN SATURATION: 98 % | HEART RATE: 66 BPM | DIASTOLIC BLOOD PRESSURE: 64 MMHG

## 2019-11-01 PROCEDURE — 3331090002 HH PPS REVENUE DEBIT

## 2019-11-01 PROCEDURE — 3331090001 HH PPS REVENUE CREDIT

## 2019-11-02 PROCEDURE — 3331090001 HH PPS REVENUE CREDIT

## 2019-11-02 PROCEDURE — 3331090002 HH PPS REVENUE DEBIT

## 2019-11-03 PROCEDURE — 3331090002 HH PPS REVENUE DEBIT

## 2019-11-03 PROCEDURE — 3331090001 HH PPS REVENUE CREDIT

## 2019-11-04 PROCEDURE — 3331090001 HH PPS REVENUE CREDIT

## 2019-11-04 PROCEDURE — 3331090002 HH PPS REVENUE DEBIT

## 2019-11-05 ENCOUNTER — HOME CARE VISIT (OUTPATIENT)
Dept: SCHEDULING | Facility: HOME HEALTH | Age: 69
End: 2019-11-05
Payer: MEDICARE

## 2019-11-05 VITALS
DIASTOLIC BLOOD PRESSURE: 68 MMHG | SYSTOLIC BLOOD PRESSURE: 101 MMHG | HEART RATE: 89 BPM | RESPIRATION RATE: 18 BRPM | TEMPERATURE: 96.7 F | OXYGEN SATURATION: 97 %

## 2019-11-05 PROCEDURE — 3331090001 HH PPS REVENUE CREDIT

## 2019-11-05 PROCEDURE — G0495 RN CARE TRAIN/EDU IN HH: HCPCS

## 2019-11-05 PROCEDURE — 3331090002 HH PPS REVENUE DEBIT

## 2019-11-06 ENCOUNTER — HOME CARE VISIT (OUTPATIENT)
Dept: HOME HEALTH SERVICES | Facility: HOME HEALTH | Age: 69
End: 2019-11-06
Payer: MEDICARE

## 2019-11-06 ENCOUNTER — PATIENT OUTREACH (OUTPATIENT)
Dept: CASE MANAGEMENT | Age: 69
End: 2019-11-06

## 2019-11-06 PROCEDURE — 3331090002 HH PPS REVENUE DEBIT

## 2019-11-06 PROCEDURE — 3331090001 HH PPS REVENUE CREDIT

## 2019-11-06 NOTE — PROGRESS NOTES
Community Care Team Documentation for Patient in [facility]     Patient discharged from [address] to [place] [address], on 10/15/19.      Leanne Herrera:     1. Septic shock due to obstructing kidney stone. 2.  Acute pulmonary edema, resolved. 3.  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 4.  Pyelonephritis, improved. 5.  Severe protein-calorie malnutrition. 6.  Renal stones status post cystoscopy, right retrograde pyelogram with interpretation, right ureteroscopy with laser lithotripsy and stent change    SNF Attending Provider:       Anticipated discharge date from SNF:  10/25/19 with [facility]    PCP : Pearl Russell NP    Nurse Navigator:     [facility] Team rounds completed, updates provided by facility. Brief Summary of Care:    Patient discharged home on 10/25 with [facility]       RRAT:  Low Risk            10       Total Score        3 Has Seen PCP in Last 6 Months (Yes=3, No=0)    4 IP Visits Last 12 Months (1-3=4, 4=9, >4=11)    3 Charlson Comorbidity Score (Age + Comorbid Conditions)        Criteria that do not apply:    . Living with Significant Other. Assisted Living. LTAC. SNF. or   Rehab    Patient Length of Stay (>5 days = 3)    Pt.  Coverage (Medicare=5 , Medicaid, or Self-Pay=4)        Active Ambulatory Problems     Diagnosis Date Noted    Enterocutaneous fistula 05/15/2019    Pyelonephritis 08/31/2019    Ureterolithiasis 09/25/2019    Pyelitis 09/25/2019    Renal stone 10/07/2019    Sepsis ([address] 10/08/2019    Septic shock ([address] 10/09/2019    Malnutrition ([address] 10/09/2019    Acute pulmonary edema ([address] 10/14/2019     Resolved Ambulatory Problems     Diagnosis Date Noted    No Resolved Ambulatory Problems     Past Medical History:   Diagnosis Date    Asthma     Depression     Emphysema of lung ([address]     Gastroparesis     Hypothyroidism     Malabsorption     Pancreatic insufficiency     Restless leg

## 2019-11-07 PROCEDURE — 3331090002 HH PPS REVENUE DEBIT

## 2019-11-07 PROCEDURE — 3331090001 HH PPS REVENUE CREDIT

## 2019-11-08 ENCOUNTER — HOME CARE VISIT (OUTPATIENT)
Dept: SCHEDULING | Facility: HOME HEALTH | Age: 69
End: 2019-11-08
Payer: MEDICARE

## 2019-11-08 PROCEDURE — 3331090002 HH PPS REVENUE DEBIT

## 2019-11-08 PROCEDURE — A6216 NON-STERILE GAUZE<=16 SQ IN: HCPCS

## 2019-11-08 PROCEDURE — 3331090001 HH PPS REVENUE CREDIT

## 2019-11-08 PROCEDURE — G0495 RN CARE TRAIN/EDU IN HH: HCPCS

## 2019-11-09 VITALS
SYSTOLIC BLOOD PRESSURE: 114 MMHG | HEART RATE: 97 BPM | DIASTOLIC BLOOD PRESSURE: 74 MMHG | TEMPERATURE: 96.5 F | RESPIRATION RATE: 18 BRPM | OXYGEN SATURATION: 94 %

## 2019-11-09 PROCEDURE — 3331090002 HH PPS REVENUE DEBIT

## 2019-11-09 PROCEDURE — 3331090001 HH PPS REVENUE CREDIT

## 2019-11-10 PROCEDURE — 3331090002 HH PPS REVENUE DEBIT

## 2019-11-10 PROCEDURE — 3331090001 HH PPS REVENUE CREDIT

## 2019-11-11 ENCOUNTER — HOSPITAL ENCOUNTER (EMERGENCY)
Age: 69
Discharge: HOME | End: 2019-11-11
Attending: EMERGENCY MEDICINE | Admitting: EMERGENCY MEDICINE
Payer: MEDICARE

## 2019-11-11 ENCOUNTER — APPOINTMENT (OUTPATIENT)
Dept: GENERAL RADIOLOGY | Age: 69
End: 2019-11-11
Attending: PHYSICIAN ASSISTANT
Payer: MEDICARE

## 2019-11-11 ENCOUNTER — HOME CARE VISIT (OUTPATIENT)
Dept: HOME HEALTH SERVICES | Facility: HOME HEALTH | Age: 69
End: 2019-11-11
Payer: MEDICARE

## 2019-11-11 VITALS
TEMPERATURE: 97.6 F | RESPIRATION RATE: 25 BRPM | OXYGEN SATURATION: 96 % | SYSTOLIC BLOOD PRESSURE: 111 MMHG | DIASTOLIC BLOOD PRESSURE: 66 MMHG | HEART RATE: 108 BPM

## 2019-11-11 VITALS
DIASTOLIC BLOOD PRESSURE: 77 MMHG | OXYGEN SATURATION: 100 % | TEMPERATURE: 98.3 F | BODY MASS INDEX: 17.34 KG/M2 | HEIGHT: 59 IN | HEART RATE: 72 BPM | RESPIRATION RATE: 18 BRPM | SYSTOLIC BLOOD PRESSURE: 123 MMHG | WEIGHT: 86 LBS

## 2019-11-11 DIAGNOSIS — R11.2 NAUSEA AND VOMITING, INTRACTABILITY OF VOMITING NOT SPECIFIED, UNSPECIFIED VOMITING TYPE: Primary | ICD-10-CM

## 2019-11-11 DIAGNOSIS — N39.0 ACUTE UTI: ICD-10-CM

## 2019-11-11 DIAGNOSIS — N39.0 UTI (URINARY TRACT INFECTION), UNCOMPLICATED: ICD-10-CM

## 2019-11-11 DIAGNOSIS — T78.40XA ALLERGIC REACTION, INITIAL ENCOUNTER: Primary | ICD-10-CM

## 2019-11-11 LAB
ALBUMIN SERPL-MCNC: 2.7 G/DL (ref 3.4–5)
ALBUMIN/GLOB SERPL: 0.6 {RATIO} (ref 0.8–1.7)
ALP SERPL-CCNC: 214 U/L (ref 45–117)
ALT SERPL-CCNC: 14 U/L (ref 13–56)
ANION GAP SERPL CALC-SCNC: 8 MMOL/L (ref 3–18)
APPEARANCE UR: ABNORMAL
AST SERPL-CCNC: 14 U/L (ref 10–38)
BACTERIA URNS QL MICRO: ABNORMAL /HPF
BASOPHILS # BLD: 0.1 K/UL (ref 0–0.1)
BASOPHILS NFR BLD: 1 % (ref 0–2)
BILIRUB SERPL-MCNC: 0.6 MG/DL (ref 0.2–1)
BILIRUB UR QL: NEGATIVE
BUN SERPL-MCNC: 13 MG/DL (ref 7–18)
BUN/CREAT SERPL: 20 (ref 12–20)
CALCIUM SERPL-MCNC: 9.4 MG/DL (ref 8.5–10.1)
CAOX CRY URNS QL MICRO: ABNORMAL
CHLORIDE SERPL-SCNC: 99 MMOL/L (ref 100–111)
CK MB CFR SERPL CALC: ABNORMAL % (ref 0–4)
CK MB SERPL-MCNC: <1 NG/ML (ref 5–25)
CK SERPL-CCNC: 19 U/L (ref 26–192)
CO2 SERPL-SCNC: 26 MMOL/L (ref 21–32)
COLOR UR: YELLOW
CREAT SERPL-MCNC: 0.64 MG/DL (ref 0.6–1.3)
DIFFERENTIAL METHOD BLD: ABNORMAL
EOSINOPHIL # BLD: 0.1 K/UL (ref 0–0.4)
EOSINOPHIL NFR BLD: 1 % (ref 0–5)
EPITH CASTS URNS QL MICRO: ABNORMAL /LPF (ref 0–5)
ERYTHROCYTE [DISTWIDTH] IN BLOOD BY AUTOMATED COUNT: 15.5 % (ref 11.6–14.5)
GLOBULIN SER CALC-MCNC: 4.9 G/DL (ref 2–4)
GLUCOSE SERPL-MCNC: 87 MG/DL (ref 74–99)
GLUCOSE UR STRIP.AUTO-MCNC: NEGATIVE MG/DL
HCT VFR BLD AUTO: 37.1 % (ref 35–45)
HGB BLD-MCNC: 11.8 G/DL (ref 12–16)
HGB UR QL STRIP: NEGATIVE
KETONES UR QL STRIP.AUTO: 15 MG/DL
LEUKOCYTE ESTERASE UR QL STRIP.AUTO: ABNORMAL
LIPASE SERPL-CCNC: 49 U/L (ref 73–393)
LYMPHOCYTES # BLD: 2.3 K/UL (ref 0.9–3.6)
LYMPHOCYTES NFR BLD: 26 % (ref 21–52)
MAGNESIUM SERPL-MCNC: 1.8 MG/DL (ref 1.6–2.6)
MCH RBC QN AUTO: 27.3 PG (ref 24–34)
MCHC RBC AUTO-ENTMCNC: 31.8 G/DL (ref 31–37)
MCV RBC AUTO: 85.9 FL (ref 74–97)
MONOCYTES # BLD: 0.8 K/UL (ref 0.05–1.2)
MONOCYTES NFR BLD: 9 % (ref 3–10)
NEUTS SEG # BLD: 5.5 K/UL (ref 1.8–8)
NEUTS SEG NFR BLD: 63 % (ref 40–73)
NITRITE UR QL STRIP.AUTO: NEGATIVE
PH UR STRIP: 6.5 [PH] (ref 5–8)
PLATELET # BLD AUTO: 483 K/UL (ref 135–420)
PMV BLD AUTO: 10 FL (ref 9.2–11.8)
POTASSIUM SERPL-SCNC: 4.5 MMOL/L (ref 3.5–5.5)
PROT SERPL-MCNC: 7.6 G/DL (ref 6.4–8.2)
PROT UR STRIP-MCNC: NEGATIVE MG/DL
RBC # BLD AUTO: 4.32 M/UL (ref 4.2–5.3)
RBC #/AREA URNS HPF: ABNORMAL /HPF (ref 0–5)
SODIUM SERPL-SCNC: 133 MMOL/L (ref 136–145)
SP GR UR REFRACTOMETRY: 1.02 (ref 1–1.03)
TROPONIN I SERPL-MCNC: <0.02 NG/ML (ref 0–0.04)
UROBILINOGEN UR QL STRIP.AUTO: 0.2 EU/DL (ref 0.2–1)
WBC # BLD AUTO: 8.7 K/UL (ref 4.6–13.2)
WBC URNS QL MICRO: ABNORMAL /HPF (ref 0–5)

## 2019-11-11 PROCEDURE — 96361 HYDRATE IV INFUSION ADD-ON: CPT

## 2019-11-11 PROCEDURE — 96375 TX/PRO/DX INJ NEW DRUG ADDON: CPT

## 2019-11-11 PROCEDURE — 87077 CULTURE AEROBIC IDENTIFY: CPT

## 2019-11-11 PROCEDURE — 96374 THER/PROPH/DIAG INJ IV PUSH: CPT

## 2019-11-11 PROCEDURE — A4452 WATERPROOF TAPE: HCPCS

## 2019-11-11 PROCEDURE — 74011000250 HC RX REV CODE- 250: Performed by: EMERGENCY MEDICINE

## 2019-11-11 PROCEDURE — 93005 ELECTROCARDIOGRAM TRACING: CPT

## 2019-11-11 PROCEDURE — 3331090001 HH PPS REVENUE CREDIT

## 2019-11-11 PROCEDURE — 99285 EMERGENCY DEPT VISIT HI MDM: CPT

## 2019-11-11 PROCEDURE — C9113 INJ PANTOPRAZOLE SODIUM, VIA: HCPCS | Performed by: PHYSICIAN ASSISTANT

## 2019-11-11 PROCEDURE — 83690 ASSAY OF LIPASE: CPT

## 2019-11-11 PROCEDURE — 81001 URINALYSIS AUTO W/SCOPE: CPT

## 2019-11-11 PROCEDURE — 74011250636 HC RX REV CODE- 250/636: Performed by: PHYSICIAN ASSISTANT

## 2019-11-11 PROCEDURE — A6402 STERILE GAUZE <= 16 SQ IN: HCPCS

## 2019-11-11 PROCEDURE — 71045 X-RAY EXAM CHEST 1 VIEW: CPT

## 2019-11-11 PROCEDURE — 87086 URINE CULTURE/COLONY COUNT: CPT

## 2019-11-11 PROCEDURE — 82550 ASSAY OF CK (CPK): CPT

## 2019-11-11 PROCEDURE — 74011250636 HC RX REV CODE- 250/636: Performed by: EMERGENCY MEDICINE

## 2019-11-11 PROCEDURE — 87186 SC STD MICRODIL/AGAR DIL: CPT

## 2019-11-11 PROCEDURE — 80053 COMPREHEN METABOLIC PANEL: CPT

## 2019-11-11 PROCEDURE — 94640 AIRWAY INHALATION TREATMENT: CPT

## 2019-11-11 PROCEDURE — 85025 COMPLETE CBC W/AUTO DIFF WBC: CPT

## 2019-11-11 PROCEDURE — 3331090002 HH PPS REVENUE DEBIT

## 2019-11-11 PROCEDURE — 83735 ASSAY OF MAGNESIUM: CPT

## 2019-11-11 PROCEDURE — 74011250637 HC RX REV CODE- 250/637: Performed by: PHYSICIAN ASSISTANT

## 2019-11-11 RX ORDER — CEPHALEXIN 250 MG/1
500 CAPSULE ORAL
Status: COMPLETED | OUTPATIENT
Start: 2019-11-11 | End: 2019-11-11

## 2019-11-11 RX ORDER — PANTOPRAZOLE SODIUM 40 MG/10ML
40 INJECTION, POWDER, LYOPHILIZED, FOR SOLUTION INTRAVENOUS
Status: COMPLETED | OUTPATIENT
Start: 2019-11-11 | End: 2019-11-11

## 2019-11-11 RX ORDER — ONDANSETRON 2 MG/ML
4 INJECTION INTRAMUSCULAR; INTRAVENOUS
Status: COMPLETED | OUTPATIENT
Start: 2019-11-11 | End: 2019-11-11

## 2019-11-11 RX ORDER — DIPHENHYDRAMINE HYDROCHLORIDE 50 MG/ML
25 INJECTION, SOLUTION INTRAMUSCULAR; INTRAVENOUS
Status: COMPLETED | OUTPATIENT
Start: 2019-11-11 | End: 2019-11-11

## 2019-11-11 RX ORDER — NITROFURANTOIN 25; 75 MG/1; MG/1
100 CAPSULE ORAL 2 TIMES DAILY
Qty: 14 CAP | Refills: 0 | Status: SHIPPED | OUTPATIENT
Start: 2019-11-11 | End: 2019-11-18

## 2019-11-11 RX ORDER — ONDANSETRON 4 MG/1
4 TABLET, ORALLY DISINTEGRATING ORAL
Qty: 20 TAB | Refills: 0 | Status: SHIPPED | OUTPATIENT
Start: 2019-11-11

## 2019-11-11 RX ORDER — CEPHALEXIN 500 MG/1
500 CAPSULE ORAL 3 TIMES DAILY
Qty: 21 CAP | Refills: 0 | OUTPATIENT
Start: 2019-11-11 | End: 2019-11-11

## 2019-11-11 RX ORDER — FAMOTIDINE 10 MG/ML
20 INJECTION INTRAVENOUS
Status: COMPLETED | OUTPATIENT
Start: 2019-11-11 | End: 2019-11-11

## 2019-11-11 RX ORDER — ALBUTEROL SULFATE 0.83 MG/ML
2.5 SOLUTION RESPIRATORY (INHALATION)
Status: DISCONTINUED | OUTPATIENT
Start: 2019-11-11 | End: 2019-11-11

## 2019-11-11 RX ORDER — DIPHENHYDRAMINE HCL 25 MG
25 TABLET ORAL
Qty: 20 TAB | Refills: 0 | Status: SHIPPED | OUTPATIENT
Start: 2019-11-11

## 2019-11-11 RX ORDER — PREDNISONE 20 MG/1
60 TABLET ORAL DAILY
Qty: 15 TAB | Refills: 0 | Status: SHIPPED | OUTPATIENT
Start: 2019-11-11 | End: 2019-11-16

## 2019-11-11 RX ORDER — HYDROXYZINE 25 MG/1
25 TABLET, FILM COATED ORAL
Qty: 30 TAB | Refills: 0 | Status: SHIPPED | OUTPATIENT
Start: 2019-11-11

## 2019-11-11 RX ORDER — ALBUTEROL SULFATE 0.83 MG/ML
2.5 SOLUTION RESPIRATORY (INHALATION)
Status: COMPLETED | OUTPATIENT
Start: 2019-11-11 | End: 2019-11-11

## 2019-11-11 RX ADMIN — SODIUM CHLORIDE 1000 ML: 900 INJECTION, SOLUTION INTRAVENOUS at 15:49

## 2019-11-11 RX ADMIN — FAMOTIDINE 20 MG: 10 INJECTION, SOLUTION INTRAVENOUS at 19:36

## 2019-11-11 RX ADMIN — DIPHENHYDRAMINE HYDROCHLORIDE 25 MG: 50 INJECTION, SOLUTION INTRAMUSCULAR; INTRAVENOUS at 19:36

## 2019-11-11 RX ADMIN — SODIUM CHLORIDE 1000 ML: 900 INJECTION, SOLUTION INTRAVENOUS at 20:13

## 2019-11-11 RX ADMIN — ALBUTEROL SULFATE 2.5 MG: 2.5 SOLUTION RESPIRATORY (INHALATION) at 20:11

## 2019-11-11 RX ADMIN — CEPHALEXIN 500 MG: 250 CAPSULE ORAL at 17:52

## 2019-11-11 RX ADMIN — ONDANSETRON 4 MG: 2 INJECTION INTRAMUSCULAR; INTRAVENOUS at 15:50

## 2019-11-11 RX ADMIN — METHYLPREDNISOLONE SODIUM SUCCINATE 125 MG: 125 INJECTION, POWDER, FOR SOLUTION INTRAMUSCULAR; INTRAVENOUS at 19:36

## 2019-11-11 RX ADMIN — PANTOPRAZOLE SODIUM 40 MG: 40 INJECTION, POWDER, FOR SOLUTION INTRAVENOUS at 15:49

## 2019-11-11 NOTE — ED PROVIDER NOTES
EMERGENCY DEPARTMENT HISTORY AND PHYSICAL EXAM    Date: 11/11/2019  Patient Name: Shweta Car    History of Presenting Illness     Chief Complaint   Patient presents with    Vomiting         History Provided By: Patient    Shweta Car is a 71 y.o. female with PMHX of COPD, pancreatic insufficiency, gastroparesis, malabsorption, multiple abdominal surgeries who presents to the emergency department C/O nausea. Associated sxs include vomiting. She reports that 2-day history of nausea and vomiting. Patient states she is unable to even keep water down. Pt denies abdominal pain, fever, chest pain, shortness of breath, diarrhea, known sick contacts, and any other sxs or complaints. PCP: Darryle Schools D, NP    Current Facility-Administered Medications   Medication Dose Route Frequency Provider Last Rate Last Dose    cephALEXin (KEFLEX) capsule 500 mg  500 mg Oral NOW Cesar Quick, PA         Current Outpatient Medications   Medication Sig Dispense Refill    cephALEXin (KEFLEX) 500 mg capsule Take 1 Cap by mouth three (3) times daily for 7 days. 21 Cap 0    ondansetron (ZOFRAN ODT) 4 mg disintegrating tablet Take 1 Tab by mouth every eight (8) hours as needed for Nausea. 20 Tab 0    temazepam (RESTORIL) 30 mg capsule Take 30 mg by mouth nightly as needed for Sleep.  umeclidinium (INCRUSE ELLIPTA) 62.5 mcg/actuation inhaler Take 1 Puff by inhalation daily.  hydrOXYzine HCl (ATARAX) 25 mg tablet Take 25 mg by mouth two (2) times daily as needed for Anxiety or Itching.  cyanocobalamin 1,000 mcg tablet Take 1,000 mcg by mouth daily.  naloxone (NARCAN) 4 mg/actuation nasal spray 1 [place] by IntraNASal route once as needed for Other (opiod overdose).  montelukast (SINGULAIR) 10 mg tablet Take 4 mg by mouth daily.  multivitamin with iron tablet Take 1 Tab by mouth daily.       lipase-protease-amylase (CREON) 12,000-38,000 -60,000 unit capsule Take 1 Cap by mouth three (3) times daily (with meals). Indications: with snack      fluticasone propion-salmeterol (ADVAIR DISKUS) 500-50 mcg/dose diskus inhaler Take 1 Puff by inhalation every twelve (12) hours.  acetaminophen (TYLENOL EXTRA STRENGTH) 500 mg tablet Take 500 mg by mouth every four (4) hours as needed for Pain.  diphenhydrAMINE (BENADRYL ALLERGY) 25 mg tablet Take 25 mg by mouth every six (6) hours as needed for Itching or Sleep.  ropinirole HCl (REQUIP PO) Take 1 mg by mouth daily as needed (restless leg syndrome (RLS)).  levothyroxine (SYNTHROID) 112 mcg tablet Take 88 mcg by mouth Daily (before breakfast).  nystatin-triamcinolone (MYCOLOG II) topical cream Apply  to affected area three (3) times daily. 30 g 2    lipase-protease-amylase (CREON) 12,000-38,000 -60,000 unit capsule Take 2 Caps by mouth three (3) times daily (with meals).  loperamide (IMODIUM) 2 mg capsule Take 2 mg by mouth four (4) times daily as needed for Diarrhea.  buPROPion XL (WELLBUTRIN XL) 150 mg tablet Take 300 mg by mouth every morning.  DULoxetine (CYMBALTA) 20 mg capsule Take 60 mg by mouth daily.  albuterol-ipratropium (DUO-NEB) 2.5 mg-0.5 mg/3 ml nebu 3 mL by Nebulization route every four (4) hours as needed for Other (SOB or wheezing).  albuterol (PROVENTIL HFA, VENTOLIN HFA, PROAIR HFA) 90 mcg/actuation inhaler Take 1 Puff by inhalation every four (4) hours as needed for Wheezing.  1 Inhaler 1       Past History     Past Medical History:  Past Medical History:   Diagnosis Date    Asthma     Depression     Emphysema of lung (HCC)     Gastroparesis     Hypothyroidism     Malabsorption     of fat    Pancreatic insufficiency     Restless leg     bilateral       Past Surgical History:  Past Surgical History:   Procedure Laterality Date    ABDOMEN SURGERY PROC UNLISTED      HX HERNIA REPAIR      HX SMALL BOWEL RESECTION      intra aortic baloon pump    HX TUBAL LIGATION         Family History:  History reviewed. No pertinent family history. Social History:  Social History     Tobacco Use    Smoking status: Former Smoker    Smokeless tobacco: Never Used   Substance Use Topics    Alcohol use: Never     Frequency: Never    Drug use: Not Currently       Allergies:  No Known Allergies      Review of Systems   Review of Systems   Constitutional: Negative for fever. Respiratory: Negative for shortness of breath. Cardiovascular: Negative for chest pain. Gastrointestinal: Positive for nausea and vomiting. Negative for abdominal pain, blood in stool and diarrhea. Musculoskeletal: Negative for myalgias. Neurological: Negative for syncope and weakness. All other systems reviewed and are negative. Physical Exam     Vitals:    11/11/19 1420 11/11/19 1600 11/11/19 1700   BP: 113/72 116/68 123/77   Pulse: 72     Resp: 18     Temp: 98.3 °F (36.8 °C)     SpO2: 100% 100% 100%   Weight: 39 kg (86 lb)     Height: 4' 11\" (1.499 m)       Physical Exam   Constitutional: She is oriented to person, place, and time. She appears well-developed and well-nourished. Thin elderly reclined on stretcher appears uncomfortable spitting into a Styrofoam cup   HENT:   Head: Normocephalic and atraumatic. Eyes: Pupils are equal, round, and reactive to light. Conjunctivae and EOM are normal. No scleral icterus. Neck: Normal range of motion. Neck supple. Cardiovascular: Normal rate and regular rhythm. Pulmonary/Chest: Effort normal and breath sounds normal.   Abdominal: Soft. Bowel sounds are normal. She exhibits no distension. There is no tenderness. There is no rebound and no guarding. Upper abdominal wall with healing wound appears healing stoma almost completely closed some mild yellow like exudate, does not appear acutely infected   Musculoskeletal: Normal range of motion. Neurological: She is alert and oriented to person, place, and time. Skin: Skin is warm and dry.    Psychiatric: She has a normal mood and affect. Her behavior is normal.   Nursing note and vitals reviewed. Diagnostic Study Results     Labs -     Recent Results (from the past 12 hour(s))   EKG, 12 LEAD, INITIAL    Collection Time: 11/11/19  2:52 PM   Result Value Ref Range    Ventricular Rate 111 BPM    Atrial Rate 111 BPM    P-R Interval 156 ms    QRS Duration 66 ms    Q-T Interval 298 ms    QTC Calculation (Bezet) 405 ms    Calculated P Axis 77 degrees    Calculated R Axis 40 degrees    Calculated T Axis 54 degrees    Diagnosis       Sinus tachycardia  Otherwise normal ECG  When compared with ECG of 26-SEP-2019 03:49,  No significant change was found     CBC WITH AUTOMATED DIFF    Collection Time: 11/11/19  2:55 PM   Result Value Ref Range    WBC 8.7 4.6 - 13.2 K/uL    RBC 4.32 4.20 - 5.30 M/uL    HGB 11.8 (L) 12.0 - 16.0 g/dL    HCT 37.1 35.0 - 45.0 %    MCV 85.9 74.0 - 97.0 FL    MCH 27.3 24.0 - 34.0 PG    MCHC 31.8 31.0 - 37.0 g/dL    RDW 15.5 (H) 11.6 - 14.5 %    PLATELET 615 (H) 753 - 420 K/uL    MPV 10.0 9.2 - 11.8 FL    NEUTROPHILS 63 40 - 73 %    LYMPHOCYTES 26 21 - 52 %    MONOCYTES 9 3 - 10 %    EOSINOPHILS 1 0 - 5 %    BASOPHILS 1 0 - 2 %    ABS. NEUTROPHILS 5.5 1.8 - 8.0 K/UL    ABS. LYMPHOCYTES 2.3 0.9 - 3.6 K/UL    ABS. MONOCYTES 0.8 0.05 - 1.2 K/UL    ABS. EOSINOPHILS 0.1 0.0 - 0.4 K/UL    ABS.  BASOPHILS 0.1 0.0 - 0.1 K/UL    DF AUTOMATED     METABOLIC PANEL, COMPREHENSIVE    Collection Time: 11/11/19  2:55 PM   Result Value Ref Range    Sodium 133 (L) 136 - 145 mmol/L    Potassium 4.5 3.5 - 5.5 mmol/L    Chloride 99 (L) 100 - 111 mmol/L    CO2 26 21 - 32 mmol/L    Anion gap 8 3.0 - 18 mmol/L    Glucose 87 74 - 99 mg/dL    BUN 13 7.0 - 18 MG/DL    Creatinine 0.64 0.6 - 1.3 MG/DL    BUN/Creatinine ratio 20 12 - 20      GFR est AA >60 >60 ml/min/1.73m2    GFR est non-AA >60 >60 ml/min/1.73m2    Calcium 9.4 8.5 - 10.1 MG/DL    Bilirubin, total 0.6 0.2 - 1.0 MG/DL    ALT (SGPT) 14 13 - 56 U/L    AST (SGOT) 14 10 - 38 U/L    Alk. phosphatase 214 (H) 45 - 117 U/L    Protein, total 7.6 6.4 - 8.2 g/dL    Albumin 2.7 (L) 3.4 - 5.0 g/dL    Globulin 4.9 (H) 2.0 - 4.0 g/dL    A-G Ratio 0.6 (L) 0.8 - 1.7     CARDIAC PANEL,(CK, CKMB & TROPONIN)    Collection Time: 11/11/19  2:55 PM   Result Value Ref Range    CK 19 (L) 26 - 192 U/L    CK - MB <1.0 <3.6 ng/ml    CK-MB Index  0.0 - 4.0 %     CALCULATION NOT PERFORMED WHEN RESULT IS BELOW LINEAR LIMIT    Troponin-I, QT <0.02 0.0 - 0.045 NG/ML   LIPASE    Collection Time: 11/11/19  2:55 PM   Result Value Ref Range    Lipase 49 (L) 73 - 393 U/L   MAGNESIUM    Collection Time: 11/11/19  2:55 PM   Result Value Ref Range    Magnesium 1.8 1.6 - 2.6 mg/dL   URINALYSIS W/ RFLX MICROSCOPIC    Collection Time: 11/11/19  5:00 PM   Result Value Ref Range    Color YELLOW      Appearance CLOUDY      Specific gravity 1.018 1.005 - 1.030      pH (UA) 6.5 5.0 - 8.0      Protein NEGATIVE  NEG mg/dL    Glucose NEGATIVE  NEG mg/dL    Ketone 15 (A) NEG mg/dL    Bilirubin NEGATIVE  NEG      Blood NEGATIVE  NEG      Urobilinogen 0.2 0.2 - 1.0 EU/dL    Nitrites NEGATIVE  NEG      Leukocyte Esterase MODERATE (A) NEG     URINE MICROSCOPIC ONLY    Collection Time: 11/11/19  5:00 PM   Result Value Ref Range    WBC 5 to 10 0 - 5 /hpf    RBC 3 to 5 0 - 5 /hpf    Epithelial cells 4+ 0 - 5 /lpf    Bacteria 1+ (A) NEG /hpf    CA Oxalate crystals 10 to 25 NEG       Radiologic Studies -   XR CHEST PORT   Final Result   IMPRESSION:         1. Overall improved pulmonary aeration with resolution of previously seen   interstitial edema as well as bilateral pleural effusions. CT Results  (Last 48 hours)    None        CXR Results  (Last 48 hours)               11/11/19 1545  XR CHEST PORT Final result    Impression:  IMPRESSION:           1. Overall improved pulmonary aeration with resolution of previously seen   interstitial edema as well as bilateral pleural effusions.         Narrative:  EXAM: XR CHEST PORT CLINICAL INDICATION/HISTORY: Vomiting and dizziness   -Additional: None       COMPARISON: Several prior exams, most recently 10/14/2019       TECHNIQUE: Frontal view of the chest       _______________       FINDINGS:       HEART AND MEDIASTINUM: Normal cardiac size and mediastinal contours. LUNGS AND PLEURAL SPACES: Resolution of previously seen interstitial edema as   well as clearance of bilateral pleural effusions. No focal pneumonic   consolidation. No pneumothorax. BONY THORAX AND SOFT TISSUES: No acute osseous abnormality. Prior right shoulder   rotator cuff repair.       _______________                 Medications given in the ED-  Medications   cephALEXin (KEFLEX) capsule 500 mg (has no administration in time range)   sodium chloride 0.9 % bolus infusion 1,000 mL (0 mL IntraVENous IV Completed 11/11/19 1636)   ondansetron (ZOFRAN) injection 4 mg (4 mg IntraVENous Given 11/11/19 1550)   pantoprazole (PROTONIX) injection 40 mg (40 mg IntraVENous Given 11/11/19 1549)         Medical Decision Making   I am the first provider for this patient. I reviewed the vital signs, available nursing notes, past medical history, past surgical history, family history and social history. Vital Signs-Reviewed the patient's vital signs. Pulse Oximetry Analysis - 100% on RA     Records Reviewed: Nursing Notes    Procedures:  Procedures    ED Course:   2:57 PM   Initial assessment performed. The patients presenting problems have been discussed, and they are in agreement with the care plan formulated and outlined with them. I have encouraged them to ask questions as they arise throughout their visit. 3:56 PM   Patient feeling better nausea improved. Patient asking for water will p.o. challenge with ice chips and reassess. 5:48 PM  She continues to do well feeling much better tolerating p.o. Review laboratory findings significant for very mild urinary tract infection.   She is hopeful for discharge will see her PCP this week for follow-up appointment. Discussion: 71 y.o. female that 2 days of nausea and vomiting. Patient is afebrile with appropriate vital signs laboratory findings only significant for mild UTI. She is tolerating p.o. at the time of discharge. Plan for antibiotics antiemetics and strict return precautions discussed. Diagnosis and Disposition       DISCHARGE NOTE:  Nishi Bautista  results have been reviewed with her. She has been counseled regarding her diagnosis, treatment, and plan. She verbally conveys understanding and agreement of the signs, symptoms, diagnosis, treatment and prognosis and additionally agrees to follow up as discussed. She also agrees with the care-plan and conveys that all of her questions have been answered. I have also provided discharge instructions for her that include: educational information regarding their diagnosis and treatment, and list of reasons why they would want to return to the ED prior to their follow-up appointment, should her condition change. She has been provided with education for proper emergency department utilization. CLINICAL IMPRESSION:    1. Nausea and vomiting, intractability of vomiting not specified, unspecified vomiting type    2. UTI (urinary tract infection), uncomplicated        PLAN:  1. D/C Home  2. Current Discharge Medication List      START taking these medications    Details   cephALEXin (KEFLEX) 500 mg capsule Take 1 Cap by mouth three (3) times daily for 7 days. Qty: 21 Cap, Refills: 0      ondansetron (ZOFRAN ODT) 4 mg disintegrating tablet Take 1 Tab by mouth every eight (8) hours as needed for Nausea. Qty: 20 Tab, Refills: 0           3.    Follow-up Information     Follow up With Specialties Details Why Contact Luma Russell NP Nurse Practitioner Schedule an appointment as soon as possible for a visit  [address] [place] [address]      [facility] EMERGENCY DEPT Emergency Medicine  As needed, If symptoms worsen [address] [place] [address]  [phone]                  Please note that this dictation was completed with [employer], the computer voice recognition software. Quite often unanticipated grammatical, syntax, homophones, and other interpretive errors are inadvertently transcribed by the computer software. Please disregard these errors. Please excuse any errors that have escaped final proofreading.

## 2019-11-11 NOTE — DISCHARGE INSTRUCTIONS
Patient Education        Nausea and Vomiting: Care Instructions  Your Care Instructions    When you are nauseated, you may feel weak and sweaty and notice a lot of saliva in your mouth. Nausea often leads to vomiting. Most of the time you do not need to worry about nausea and vomiting, but they can be signs of other illnesses. Two common causes of nausea and vomiting are stomach flu and food poisoning. Nausea and vomiting from viral stomach flu will usually start to improve within 24 hours. Nausea and vomiting from food poisoning may last from 12 to 48 hours. The doctor has checked you carefully, but problems can develop later. If you notice any problems or new symptoms, get medical treatment right away. Follow-up care is a key part of your treatment and safety. Be sure to make and go to all appointments, and call your doctor if you are having problems. It's also a good idea to know your test results and keep a list of the medicines you take. How can you care for yourself at home? · To prevent dehydration, drink plenty of fluids, enough so that your urine is light yellow or clear like water. Choose water and other caffeine-free clear liquids until you feel better. If you have kidney, heart, or liver disease and have to limit fluids, talk with your doctor before you increase the amount of fluids you drink. · Rest in bed until you feel better. · When you are able to eat, try clear soups, mild foods, and liquids until all symptoms are gone for 12 to 48 hours. Other good choices include dry toast, crackers, cooked cereal, and gelatin dessert, such as Jell-O. When should you call for help? Call 911 anytime you think you may need emergency care. For example, call if:    · You passed out (lost consciousness).    Call your doctor now or seek immediate medical care if:    · You have symptoms of dehydration, such as:  ? Dry eyes and a dry mouth. ? Passing only a little dark urine. ?  Feeling thirstier than usual.   · You have new or worsening belly pain.     · You have a new or higher fever.     · You vomit blood or what looks like coffee grounds.    Watch closely for changes in your health, and be sure to contact your doctor if:    · You have ongoing nausea and vomiting.     · Your vomiting is getting worse.     · Your vomiting lasts longer than 2 days.     · You are not getting better as expected. Where can you learn more? Go to http://rocky-kenneth.info/. Enter 25 [id number] in the search box to learn more about \"Nausea and Vomiting: Care Instructions. \"  Current as of: June 26, 2019  Content Version: 12.2  © 6762-0605 [employer]. Care instructions adapted under license by [employer] (which disclaims liability or warranty for this information). If you have questions about a medical condition or this instruction, always ask your healthcare professional. [address] any warranty or liability for your use of this information.

## 2019-11-12 PROCEDURE — 3331090002 HH PPS REVENUE DEBIT

## 2019-11-12 PROCEDURE — 3331090001 HH PPS REVENUE CREDIT

## 2019-11-12 NOTE — DISCHARGE INSTRUCTIONS
Allergic Reaction: Care Instructions  Your Care Instructions    An allergic reaction is an excessive response from your immune system to a medicine, chemical, food, insect bite, or other substance. A reaction can range from mild to life-threatening. Some people have a mild rash, hives, and itching or stomach cramps. In severe reactions, swelling of your tongue and throat can close up your airway so that you cannot breathe. Follow-up care is a key part of your treatment and safety. Be sure to make and go to all appointments, and call your doctor if you are having problems. It's also a good idea to know your test results and keep a list of the medicines you take. How can you care for yourself at home? · If you know what caused your allergic reaction, be sure to avoid it. Your allergy may become more severe each time you have a reaction. · Take an over-the-counter antihistamine, such as cetirizine (Zyrtec) or loratadine (Claritin), to treat mild symptoms. Read and follow directions on the label. Some antihistamines can make you feel sleepy. Do not give antihistamines to a child unless you have checked with your doctor first. Mild symptoms include sneezing or an itchy or runny nose; an itchy mouth; a few hives or mild itching; and mild nausea or stomach discomfort. · Do not scratch hives or a rash. Put a cold, moist towel on them or take cool baths to relieve itching. Put ice packs on hives, swelling, or insect stings for 10 to 15 minutes at a time. Put a thin cloth between the ice pack and your skin. Do not take hot baths or showers. They will make the itching worse. · Your doctor may prescribe a shot of epinephrine to carry with you in case you have a severe reaction. Learn how to give yourself the shot and keep it with you at all times. Make sure it is not . · Go to the emergency room every time you have a severe reaction, even if you have used your shot of epinephrine and are feeling better. Symptoms can come back after a shot. · Wear medical alert jewelry that lists your allergies. You can buy this at most drugstores. · If your child has a severe allergy, make sure that his or her teachers, babysitters, coaches, and other caregivers know about the allergy. They should have an epinephrine shot, know how and when to give it, and have a plan to take your child to the hospital.  When should you call for help? Give an epinephrine shot if:    · You think you are having a severe allergic reaction.     · You have symptoms in more than one body area, such as mild nausea and an itchy mouth.    After giving an epinephrine shot call 911, even if you feel better.   Call 911 if:    · You have symptoms of a severe allergic reaction. These may include:  ? Sudden raised, red areas (hives) all over your body. ? Swelling of the throat, mouth, lips, or tongue. ? Trouble breathing. ? Passing out (losing consciousness). Or you may feel very lightheaded or suddenly feel weak, confused, or restless.     · You have been given an epinephrine shot, even if you feel better.    Call your doctor now or seek immediate medical care if:    · You have symptoms of an allergic reaction, such as:  ? A rash or hives (raised, red areas on the skin). ? Itching. ? Swelling. ? Belly pain, nausea, or vomiting.    Watch closely for changes in your health, and be sure to contact your doctor if:    · You do not get better as expected. Where can you learn more? Go to http://rocky-kenneth.info/. Enter R111 in the search box to learn more about \"Allergic Reaction: Care Instructions. \"  Current as of: April 7, 2019  Content Version: 12.2  © 8340-2595 [employer]. Care instructions adapted under license by [employer] (which disclaims liability or warranty for this information).  If you have questions about a medical condition or this instruction, always ask your healthcare professional. [employer] disclaims any warranty or liability for your use of this information. Urinary Tract Infection in Women: Care Instructions  Your Care Instructions    A urinary tract infection, or UTI, is a general term for an infection anywhere between the kidneys and the urethra (where urine comes out). Most UTIs are bladder infections. They often cause pain or burning when you urinate. UTIs are caused by bacteria and can be cured with antibiotics. Be sure to complete your treatment so that the infection goes away. Follow-up care is a key part of your treatment and safety. Be sure to make and go to all appointments, and call your doctor if you are having problems. It's also a good idea to know your test results and keep a list of the medicines you take. How can you care for yourself at home? · Take your antibiotics as directed. Do not stop taking them just because you feel better. You need to take the full course of antibiotics. · Drink extra water and other fluids for the next day or two. This may help wash out the bacteria that are causing the infection. (If you have kidney, heart, or liver disease and have to limit fluids, talk with your doctor before you increase your fluid intake.)  · Avoid drinks that are carbonated or have caffeine. They can irritate the bladder. · Urinate often. Try to empty your bladder each time. · To relieve pain, take a hot bath or lay a heating pad set on low over your lower belly or genital area. Never go to sleep with a heating pad in place. To prevent UTIs  · Drink plenty of water each day. This helps you urinate often, which clears bacteria from your system. (If you have kidney, heart, or liver disease and have to limit fluids, talk with your doctor before you increase your fluid intake.)  · Urinate when you need to. · Urinate right after you have sex. · Change sanitary pads often.   · Avoid douches, bubble baths, feminine hygiene sprays, and other feminine hygiene products that have deodorants. · After going to the bathroom, wipe from front to back. When should you call for help? Call your doctor now or seek immediate medical care if:    · Symptoms such as fever, chills, nausea, or vomiting get worse or appear for the first time.     · You have new pain in your back just below your rib cage. This is called flank pain.     · There is new blood or pus in your urine.     · You have any problems with your antibiotic medicine.    Watch closely for changes in your health, and be sure to contact your doctor if:    · You are not getting better after taking an antibiotic for 2 days.     · Your symptoms go away but then come back. Where can you learn more? Go to http://rocky-kenneth.info/. Enter R125 in the search box to learn more about \"Urinary Tract Infection in Women: Care Instructions. \"  Current as of: December 19, 2018  Content Version: 12.2  © 3931-2191 [employer]. Care instructions adapted under license by [employer] (which disclaims liability or warranty for this information). If you have questions about a medical condition or this instruction, always ask your healthcare professional. [address] any warranty or liability for your use of this information.

## 2019-11-12 NOTE — ED TRIAGE NOTES
Patient was waiting for Medicaid cab s/p being seen and discharge from ED when patient reports feels \"weak and dizzy\"

## 2019-11-12 NOTE — ED NOTES
Pt had previously stated that she was still mildly short of breath, provider made aware and order for second breathing tx received. Entered room to provide medication to pt and pt states that at this time she feels better and does not need the second tx.

## 2019-11-12 NOTE — ED PROVIDER NOTES
EMERGENCY DEPARTMENT HISTORY AND PHYSICAL EXAM    Date: 11/11/2019  Patient Name: Edgar Draper    History of Presenting Illness     Chief Complaint   Patient presents with    Dizziness         History Provided By: Patient    History:   7:46 PM  Edgar Draper is a 71 y.o. female with PMHx of COPD who presents to the emergency department with complaint of acute onset dizziness. Associated symptoms include diffuse pruritus, nausea, and vomiting. Patient was just discharged from the ED 1 hour ago after being evaluated and treated for nausea and vomiting for 2 days. Her labs were normal aside from UTI. She was given Keflex PO prior to discharge. She states she became weak and dizzy while waiting for her Medicaid cab in the ED lobby. She reports that she laid herself onto the ground and denies any fall. Patient denies any other sxs or complaints. Patient denies tobacco, EtOH, or illicit drug use. PCP: Sunni DUNCAN NP    Current Outpatient Medications   Medication Sig Dispense Refill    hydrOXYzine HCl (ATARAX) 25 mg tablet Take 1 Tab by mouth two (2) times daily as needed for Anxiety or Itching. 30 Tab 0    diphenhydrAMINE (BENADRYL ALLERGY) 25 mg tablet Take 1 Tab by mouth every six (6) hours as needed for Itching or Sleep. 20 Tab 0    predniSONE (DELTASONE) 20 mg tablet Take 60 mg by mouth daily for 5 days. With Breakfast 15 Tab 0    nitrofurantoin, macrocrystal-monohydrate, (MACROBID) 100 mg capsule Take 1 Cap by mouth two (2) times a day for 7 days. 14 Cap 0    ondansetron (ZOFRAN ODT) 4 mg disintegrating tablet Take 1 Tab by mouth every eight (8) hours as needed for Nausea. 20 Tab 0    temazepam (RESTORIL) 30 mg capsule Take 30 mg by mouth nightly as needed for Sleep.  umeclidinium (INCRUSE ELLIPTA) 62.5 mcg/actuation inhaler Take 1 Puff by inhalation daily.  cyanocobalamin 1,000 mcg tablet Take 1,000 mcg by mouth daily.       naloxone (NARCAN) 4 mg/actuation nasal spray 1 Spray by IntraNASal route once as needed for Other (opiod overdose).  montelukast (SINGULAIR) 10 mg tablet Take 4 mg by mouth daily.  multivitamin with iron tablet Take 1 Tab by mouth daily.  lipase-protease-amylase (CREON) 12,000-38,000 -60,000 unit capsule Take 1 Cap by mouth three (3) times daily (with meals). Indications: with snack      fluticasone propion-salmeterol (ADVAIR DISKUS) 500-50 mcg/dose diskus inhaler Take 1 Puff by inhalation every twelve (12) hours.  acetaminophen (TYLENOL EXTRA STRENGTH) 500 mg tablet Take 500 mg by mouth every four (4) hours as needed for Pain.  ropinirole HCl (REQUIP PO) Take 1 mg by mouth daily as needed (restless leg syndrome (RLS)).  levothyroxine (SYNTHROID) 112 mcg tablet Take 88 mcg by mouth Daily (before breakfast).  nystatin-triamcinolone (MYCOLOG II) topical cream Apply  to affected area three (3) times daily. 30 g 2    lipase-protease-amylase (CREON) 12,000-38,000 -60,000 unit capsule Take 2 Caps by mouth three (3) times daily (with meals).  loperamide (IMODIUM) 2 mg capsule Take 2 mg by mouth four (4) times daily as needed for Diarrhea.  buPROPion XL (WELLBUTRIN XL) 150 mg tablet Take 300 mg by mouth every morning.  DULoxetine (CYMBALTA) 20 mg capsule Take 60 mg by mouth daily.  albuterol-ipratropium (DUO-NEB) 2.5 mg-0.5 mg/3 ml nebu 3 mL by Nebulization route every four (4) hours as needed for Other (SOB or wheezing).  albuterol (PROVENTIL HFA, VENTOLIN HFA, PROAIR HFA) 90 mcg/actuation inhaler Take 1 Puff by inhalation every four (4) hours as needed for Wheezing.  1 Inhaler 1       Past History     Past Medical History:  Past Medical History:   Diagnosis Date    Asthma     Depression     Emphysema of lung ([address]     Gastroparesis     Hypothyroidism     Malabsorption     of fat    Pancreatic insufficiency     Restless leg     bilateral       Past Surgical History:  Past Surgical History:   Procedure Laterality Date    ABDOMEN SURGERY PROC UNLISTED      HX HERNIA REPAIR      HX SMALL BOWEL RESECTION      intra aortic baloon pump    HX TUBAL LIGATION         Family History:  History reviewed. No pertinent family history. Social History:  Social History     Tobacco Use    Smoking status: Former Smoker    Smokeless tobacco: Never Used   Substance Use Topics    Alcohol use: Never     Frequency: Never    Drug use: Not Currently       Allergies: Allergies   Allergen Reactions    Keflex [Cephalexin] Shortness of Breath, Rash and Itching    Zofran [Ondansetron Hcl] Shortness of Breath, Rash and Itching         Review of Systems   Review of Systems   Constitutional: Negative for chills, diaphoresis, fever and unexpected weight change. HENT: Negative for congestion, drooling, ear pain, rhinorrhea, sore throat, tinnitus and trouble swallowing. Eyes: Negative for photophobia, pain, redness and visual disturbance. Respiratory: Negative for cough, choking, chest tightness, shortness of breath, wheezing and stridor. Cardiovascular: Negative for chest pain, palpitations and leg swelling. Gastrointestinal: Positive for nausea and vomiting. Negative for abdominal distention, abdominal pain, anal bleeding, blood in stool, constipation and diarrhea. Genitourinary: Negative for difficulty urinating, dysuria, flank pain, frequency, hematuria and urgency. Musculoskeletal: Negative for arthralgias, back pain and neck pain. Skin: Negative for color change, rash and wound. (+) diffuse pruritus   Neurological: Positive for dizziness and weakness. Negative for seizures, syncope, speech difficulty, light-headedness and headaches. Hematological: Does not bruise/bleed easily. Psychiatric/Behavioral: Negative for agitation, behavioral problems, hallucinations, self-injury and suicidal ideas. The patient is not hyperactive.         Physical Exam     Vitals:    11/11/19 2110 11/11/19 2115 11/11/19 2130 11/11/19 2145   BP:  109/67 113/69 111/64   Pulse:  (!) 108     Resp: 25      Temp:       SpO2:  98% 99% 98%     Physical Exam   Constitutional: She is oriented to person, place, and time. She appears well-developed. No distress. Elderly lady, very thin, non-toxic, a bit tremulous   HENT:   Head: Normocephalic and atraumatic. Right Ear: External ear normal.   Left Ear: External ear normal.   Mouth/Throat: Oropharynx is clear and moist. No oropharyngeal exudate. Eyes: Pupils are equal, round, and reactive to light. Conjunctivae and EOM are normal. No scleral icterus. No pallor   Neck: Normal range of motion. Neck supple. No JVD present. No tracheal deviation present. No thyromegaly present. Cardiovascular: Regular rhythm and normal heart sounds. Tachycardic   Pulmonary/Chest: Effort normal. No stridor. No respiratory distress. End expiratory wheezing   Abdominal: Soft. Bowel sounds are normal. She exhibits no distension. There is no tenderness. There is no rebound and no guarding. Dressing to her anterior abdominal wall with trace exudate to the periphery of her stoma, no redness. Musculoskeletal: Normal range of motion. She exhibits no edema or tenderness. No soft tissue injuries   Lymphadenopathy:     She has no cervical adenopathy. Neurological: She is alert and oriented to person, place, and time. She has normal reflexes. No cranial nerve deficit. Coordination normal.   Skin: Skin is warm and dry. She is not diaphoretic. Few scattered areas of erythema with visible but very mild excoriations, no urticaria. Psychiatric: She has a normal mood and affect. Her behavior is normal. Judgment and thought content normal.   Nursing note and vitals reviewed.     Diagnostic Study Results     Labs -     Recent Results (from the past 12 hour(s))   EKG, 12 LEAD, INITIAL    Collection Time: 11/11/19  2:52 PM   Result Value Ref Range    Ventricular Rate 111 BPM    Atrial Rate 111 BPM    P-R Interval 156 ms    QRS Duration 66 ms    Q-T Interval 298 ms    QTC Calculation (Bezet) 405 ms    Calculated P Axis 77 degrees    Calculated R Axis 40 degrees    Calculated T Axis 54 degrees    Diagnosis       Sinus tachycardia  Otherwise normal ECG  When compared with ECG of 26-SEP-2019 03:49,  No significant change was found     CBC WITH AUTOMATED DIFF    Collection Time: 11/11/19  2:55 PM   Result Value Ref Range    WBC 8.7 4.6 - 13.2 K/uL    RBC 4.32 4.20 - 5.30 M/uL    HGB 11.8 (L) 12.0 - 16.0 g/dL    HCT 37.1 35.0 - 45.0 %    MCV 85.9 74.0 - 97.0 FL    MCH 27.3 24.0 - 34.0 PG    MCHC 31.8 31.0 - 37.0 g/dL    RDW 15.5 (H) 11.6 - 14.5 %    PLATELET 572 (H) 425 - 420 K/uL    MPV 10.0 9.2 - 11.8 FL    NEUTROPHILS 63 40 - 73 %    LYMPHOCYTES 26 21 - 52 %    MONOCYTES 9 3 - 10 %    EOSINOPHILS 1 0 - 5 %    BASOPHILS 1 0 - 2 %    ABS. NEUTROPHILS 5.5 1.8 - 8.0 K/UL    ABS. LYMPHOCYTES 2.3 0.9 - 3.6 K/UL    ABS. MONOCYTES 0.8 0.05 - 1.2 K/UL    ABS. EOSINOPHILS 0.1 0.0 - 0.4 K/UL    ABS. BASOPHILS 0.1 0.0 - 0.1 K/UL    DF AUTOMATED     METABOLIC PANEL, COMPREHENSIVE    Collection Time: 11/11/19  2:55 PM   Result Value Ref Range    Sodium 133 (L) 136 - 145 mmol/L    Potassium 4.5 3.5 - 5.5 mmol/L    Chloride 99 (L) 100 - 111 mmol/L    CO2 26 21 - 32 mmol/L    Anion gap 8 3.0 - 18 mmol/L    Glucose 87 74 - 99 mg/dL    BUN 13 7.0 - 18 MG/DL    Creatinine 0.64 0.6 - 1.3 MG/DL    BUN/Creatinine ratio 20 12 - 20      GFR est AA >60 >60 ml/min/1.73m2    GFR est non-AA >60 >60 ml/min/1.73m2    Calcium 9.4 8.5 - 10.1 MG/DL    Bilirubin, total 0.6 0.2 - 1.0 MG/DL    ALT (SGPT) 14 13 - 56 U/L    AST (SGOT) 14 10 - 38 U/L    Alk.  phosphatase 214 (H) 45 - 117 U/L    Protein, total 7.6 6.4 - 8.2 g/dL    Albumin 2.7 (L) 3.4 - 5.0 g/dL    Globulin 4.9 (H) 2.0 - 4.0 g/dL    A-G Ratio 0.6 (L) 0.8 - 1.7     CARDIAC PANEL,(CK, CKMB & TROPONIN)    Collection Time: 11/11/19  2:55 PM   Result Value Ref Range    CK 19 (L) 26 - 192 U/L    CK - MB <1.0 <3.6 ng/ml    CK-MB Index  0.0 - 4.0 %     CALCULATION NOT PERFORMED WHEN RESULT IS BELOW LINEAR LIMIT    Troponin-I, QT <0.02 0.0 - 0.045 NG/ML   LIPASE    Collection Time: 11/11/19  2:55 PM   Result Value Ref Range    Lipase 49 (L) 73 - 393 U/L   MAGNESIUM    Collection Time: 11/11/19  2:55 PM   Result Value Ref Range    Magnesium 1.8 1.6 - 2.6 mg/dL   URINALYSIS W/ RFLX MICROSCOPIC    Collection Time: 11/11/19  5:00 PM   Result Value Ref Range    Color YELLOW      Appearance CLOUDY      Specific gravity 1.018 1.005 - 1.030      pH (UA) 6.5 5.0 - 8.0      Protein NEGATIVE  NEG mg/dL    Glucose NEGATIVE  NEG mg/dL    Ketone 15 (A) NEG mg/dL    Bilirubin NEGATIVE  NEG      Blood NEGATIVE  NEG      Urobilinogen 0.2 0.2 - 1.0 EU/dL    Nitrites NEGATIVE  NEG      Leukocyte Esterase MODERATE (A) NEG     URINE MICROSCOPIC ONLY    Collection Time: 11/11/19  5:00 PM   Result Value Ref Range    WBC 5 to 10 0 - 5 /hpf    RBC 3 to 5 0 - 5 /hpf    Epithelial cells 4+ 0 - 5 /lpf    Bacteria 1+ (A) NEG /hpf    CA Oxalate crystals 10 to 25 NEG   EKG, 12 LEAD, INITIAL    Collection Time: 11/11/19  7:34 PM   Result Value Ref Range    Ventricular Rate 119 BPM    Atrial Rate 119 BPM    P-R Interval 164 ms    QRS Duration 68 ms    Q-T Interval 304 ms    QTC Calculation (Bezet) 427 ms    Calculated P Axis 66 degrees    Calculated R Axis -19 degrees    Calculated T Axis 74 degrees    Diagnosis       Sinus tachycardia  Otherwise normal ECG  When compared with ECG of 11-NOV-2019 14:52,  No significant change was found         Radiologic Studies -    No orders to display         Medical Decision Making   I am the first provider for this patient. I reviewed the vital signs, available nursing notes, past medical history, past surgical history, family history and social history. Vital Signs-Reviewed the patient's vital signs.     Pulse Oximetry Analysis - 92% on room air     Cardiac Monitor:  Rate: 128 bpm  Rhythm: Sinus tachycardia    EKG interpretation: (Preliminary)  7:34 PM  Sinus tachycardia at 119 bpm. Motion artifact. Otherwise normal.  EKG read by Angel Romero. Warren Gonzalez MD    Records Reviewed: Nursing Notes and Old Medical Records    Provider Notes (Medical Decision Making):   Ddx: She has a mild reaction to her skin and breathing. Likely this is from her medication, though this could be coincidental. Regardless, she has minimal wheezing, no stridor or airway compromise. Will switch ABX as we consider this a new cephalosporin reaction. Treat breathing, which is likely her baseline, provide breathing medicines, steroids, and hopefully return to home disposition. Procedures:  Procedures    MEDICATIONS GIVEN:  Medications   diphenhydrAMINE (BENADRYL) injection 25 mg (25 mg IntraVENous Given 11/11/19 1936)   methylPREDNISolone (PF) (Solu-MEDROL) injection 125 mg (125 mg IntraVENous Given 11/11/19 1936)   famotidine (PF) (PEPCID) injection 20 mg (20 mg IntraVENous Given 11/11/19 1936)   sodium chloride 0.9 % bolus infusion 1,000 mL (1,000 mL IntraVENous New Bag 11/11/19 2013)   albuterol (PROVENTIL VENTOLIN) nebulizer solution 2.5 mg (2.5 mg Nebulization Given 11/11/19 2011)       ED Course:   7:46 PM   Initial assessment performed. The patients presenting problems have been discussed, and they are in agreement with the care plan formulated and outlined with them. I have encouraged them to ask questions as they arise throughout their visit. Diagnosis and Disposition     CRITICAL CARE NOTE:  10:23 PM  I have spent 45 minutes of critical care time involved in lab review, consultations with specialist, family decision-making, and documentation. During this entire length of time I was immediately available to the patient. Critical Care:   The reason for providing this level of medical care for this critically ill patient was due a critical illness that impaired one or more vital organ systems such that there was a high probability of imminent or life threatening deterioration in the patients condition. This care involved high complexity decision making to assess, manipulate, and support vital system functions, to treat this degreee vital organ system failure and to prevent further life threatening deterioration of the patients condition. DISCHARGE NOTE:  10:23 PM  Carl Barker's  results have been reviewed with her. She has been counseled regarding her diagnosis, treatment, and plan. She verbally conveys understanding and agreement of the signs, symptoms, diagnosis, treatment and prognosis and additionally agrees to follow up as discussed. She also agrees with the care-plan and conveys that all of her questions have been answered. I have also provided discharge instructions for her that include: educational information regarding their diagnosis and treatment, and list of reasons why they would want to return to the ED prior to their follow-up appointment, should her condition change. She has been provided with education for proper emergency department utilization. CLINICAL IMPRESSION:    1. Allergic reaction, initial encounter    2. Acute UTI        PLAN:  1. D/C Home  2. Current Discharge Medication List      START taking these medications    Details   predniSONE (DELTASONE) 20 mg tablet Take 60 mg by mouth daily for 5 days. With Breakfast  Qty: 15 Tab, Refills: 0      nitrofurantoin, macrocrystal-monohydrate, (MACROBID) 100 mg capsule Take 1 Cap by mouth two (2) times a day for 7 days. Qty: 14 Cap, Refills: 0         CONTINUE these medications which have CHANGED    Details   hydrOXYzine HCl (ATARAX) 25 mg tablet Take 1 Tab by mouth two (2) times daily as needed for Anxiety or Itching. Qty: 30 Tab, Refills: 0      diphenhydrAMINE (BENADRYL ALLERGY) 25 mg tablet Take 1 Tab by mouth every six (6) hours as needed for Itching or Sleep.   Qty: 20 Tab, Refills: 0         CONTINUE these medications which have NOT CHANGED    Details   ondansetron (ZOFRAN ODT) 4 mg disintegrating tablet Take 1 Tab by mouth every eight (8) hours as needed for Nausea. Qty: 20 Tab, Refills: 0      temazepam (RESTORIL) 30 mg capsule Take 30 mg by mouth nightly as needed for Sleep.      umeclidinium (INCRUSE ELLIPTA) 62.5 mcg/actuation inhaler Take 1 Puff by inhalation daily. cyanocobalamin 1,000 mcg tablet Take 1,000 mcg by mouth daily. naloxone (NARCAN) 4 mg/actuation nasal spray 1 [place] by IntraNASal route once as needed for Other (opiod overdose). montelukast (SINGULAIR) 10 mg tablet Take 4 mg by mouth daily. multivitamin with iron tablet Take 1 Tab by mouth daily. !! lipase-protease-amylase (CREON) 12,000-38,000 -60,000 unit capsule Take 1 Cap by mouth three (3) times daily (with meals). Indications: with snack    Comments: and with snacks      fluticasone propion-salmeterol (ADVAIR DISKUS) 500-50 mcg/dose diskus inhaler Take 1 Puff by inhalation every twelve (12) hours. acetaminophen (TYLENOL EXTRA STRENGTH) 500 mg tablet Take 500 mg by mouth every four (4) hours as needed for Pain. ropinirole HCl (REQUIP PO) Take 1 mg by mouth daily as needed (restless leg syndrome (RLS)). levothyroxine (SYNTHROID) 112 mcg tablet Take 88 mcg by mouth Daily (before breakfast). nystatin-triamcinolone (MYCOLOG II) topical cream Apply  to affected area three (3) times daily. Qty: 30 g, Refills: 2      !! lipase-protease-amylase (CREON) 12,000-38,000 -60,000 unit capsule Take 2 Caps by mouth three (3) times daily (with meals). loperamide (IMODIUM) 2 mg capsule Take 2 mg by mouth four (4) times daily as needed for Diarrhea. buPROPion XL (WELLBUTRIN XL) 150 mg tablet Take 300 mg by mouth every morning. DULoxetine (CYMBALTA) 20 mg capsule Take 60 mg by mouth daily.       albuterol-ipratropium (DUO-NEB) 2.5 mg-0.5 mg/3 ml nebu 3 mL by Nebulization route every four (4) hours as needed for Other (SOB or wheezing). albuterol (PROVENTIL HFA, VENTOLIN HFA, PROAIR HFA) 90 mcg/actuation inhaler Take 1 Puff by inhalation every four (4) hours as needed for Wheezing. Qty: 1 Inhaler, Refills: 1       !! - Potential duplicate medications found. Please discuss with provider. STOP taking these medications       cephALEXin (KEFLEX) 500 mg capsule Comments:   Reason for Stopping:             3.   Follow-up Information     Follow up With Specialties Details Why Contact Info    Chandler Jones NP Nurse Practitioner Schedule an appointment as soon as possible for a visit in 2 days For primary care follow up [address] [facility] EMERGENCY DEPT Emergency Medicine  As needed, If symptoms worsen [address] [place] [address]  [phone]          _______________________________    This note was prepared by Brian Granados, acting as Scribe for Will. Katelynn Soto MD.    [employer]. Katelynn Soto MD:  The scribe's documentation has been prepared under my direction and personally reviewed by me in its entirety. I confirm that the note above accurately reflects all work, treatment, procedures, and medical decision making performed by me.

## 2019-11-13 ENCOUNTER — HOME CARE VISIT (OUTPATIENT)
Dept: SCHEDULING | Facility: HOME HEALTH | Age: 69
End: 2019-11-13
Payer: MEDICARE

## 2019-11-13 VITALS
TEMPERATURE: 96.9 F | DIASTOLIC BLOOD PRESSURE: 81 MMHG | HEART RATE: 100 BPM | OXYGEN SATURATION: 100 % | SYSTOLIC BLOOD PRESSURE: 134 MMHG | RESPIRATION RATE: 18 BRPM

## 2019-11-13 LAB
ATRIAL RATE: 111 BPM
ATRIAL RATE: 119 BPM
CALCULATED P AXIS, ECG09: 66 DEGREES
CALCULATED P AXIS, ECG09: 77 DEGREES
CALCULATED R AXIS, ECG10: -19 DEGREES
CALCULATED R AXIS, ECG10: 40 DEGREES
CALCULATED T AXIS, ECG11: 54 DEGREES
CALCULATED T AXIS, ECG11: 74 DEGREES
DIAGNOSIS, 93000: NORMAL
DIAGNOSIS, 93000: NORMAL
P-R INTERVAL, ECG05: 156 MS
P-R INTERVAL, ECG05: 164 MS
Q-T INTERVAL, ECG07: 298 MS
Q-T INTERVAL, ECG07: 304 MS
QRS DURATION, ECG06: 66 MS
QRS DURATION, ECG06: 68 MS
QTC CALCULATION (BEZET), ECG08: 405 MS
QTC CALCULATION (BEZET), ECG08: 427 MS
VENTRICULAR RATE, ECG03: 111 BPM
VENTRICULAR RATE, ECG03: 119 BPM

## 2019-11-13 PROCEDURE — 3331090001 HH PPS REVENUE CREDIT

## 2019-11-13 PROCEDURE — G0495 RN CARE TRAIN/EDU IN HH: HCPCS

## 2019-11-13 PROCEDURE — A6250 SKIN SEAL PROTECT MOISTURIZR: HCPCS

## 2019-11-13 PROCEDURE — 3331090002 HH PPS REVENUE DEBIT

## 2019-11-13 PROCEDURE — A5120 SKIN BARRIER, WIPE OR SWAB: HCPCS

## 2019-11-13 PROCEDURE — A4450 NON-WATERPROOF TAPE: HCPCS

## 2019-11-13 PROCEDURE — A6402 STERILE GAUZE <= 16 SQ IN: HCPCS

## 2019-11-14 ENCOUNTER — HOME CARE VISIT (OUTPATIENT)
Dept: HOME HEALTH SERVICES | Facility: HOME HEALTH | Age: 69
End: 2019-11-14
Payer: MEDICARE

## 2019-11-14 PROCEDURE — 3331090001 HH PPS REVENUE CREDIT

## 2019-11-14 PROCEDURE — 3331090002 HH PPS REVENUE DEBIT

## 2019-11-15 LAB
BACTERIA SPEC CULT: ABNORMAL
BACTERIA SPEC CULT: ABNORMAL
SERVICE CMNT-IMP: ABNORMAL

## 2019-11-15 PROCEDURE — 3331090001 HH PPS REVENUE CREDIT

## 2019-11-15 PROCEDURE — 3331090002 HH PPS REVENUE DEBIT

## 2019-11-16 PROCEDURE — 3331090001 HH PPS REVENUE CREDIT

## 2019-11-16 PROCEDURE — 3331090002 HH PPS REVENUE DEBIT

## 2019-11-17 PROCEDURE — 3331090001 HH PPS REVENUE CREDIT

## 2019-11-17 PROCEDURE — 3331090002 HH PPS REVENUE DEBIT

## 2019-11-18 PROCEDURE — 3331090002 HH PPS REVENUE DEBIT

## 2019-11-18 PROCEDURE — 3331090001 HH PPS REVENUE CREDIT

## 2019-11-19 PROCEDURE — 3331090001 HH PPS REVENUE CREDIT

## 2019-11-19 PROCEDURE — 3331090002 HH PPS REVENUE DEBIT

## 2019-11-20 PROCEDURE — 3331090001 HH PPS REVENUE CREDIT

## 2019-11-20 PROCEDURE — 3331090002 HH PPS REVENUE DEBIT

## 2019-11-21 PROCEDURE — 3331090002 HH PPS REVENUE DEBIT

## 2019-11-21 PROCEDURE — 3331090001 HH PPS REVENUE CREDIT

## 2019-11-22 ENCOUNTER — HOME CARE VISIT (OUTPATIENT)
Dept: SCHEDULING | Facility: HOME HEALTH | Age: 69
End: 2019-11-22
Payer: MEDICARE

## 2019-11-22 PROCEDURE — 3331090001 HH PPS REVENUE CREDIT

## 2019-11-22 PROCEDURE — 3331090002 HH PPS REVENUE DEBIT

## 2019-11-23 PROCEDURE — 3331090001 HH PPS REVENUE CREDIT

## 2019-11-23 PROCEDURE — 3331090002 HH PPS REVENUE DEBIT

## 2019-11-24 PROCEDURE — 3331090001 HH PPS REVENUE CREDIT

## 2019-11-24 PROCEDURE — 3331090002 HH PPS REVENUE DEBIT

## 2019-11-25 PROCEDURE — 3331090002 HH PPS REVENUE DEBIT

## 2019-11-25 PROCEDURE — 3331090001 HH PPS REVENUE CREDIT

## 2019-11-26 PROCEDURE — 3331090001 HH PPS REVENUE CREDIT

## 2019-11-26 PROCEDURE — 3331090002 HH PPS REVENUE DEBIT

## 2019-11-27 PROCEDURE — 3331090001 HH PPS REVENUE CREDIT

## 2019-11-27 PROCEDURE — 3331090002 HH PPS REVENUE DEBIT

## 2019-11-28 PROCEDURE — 3331090001 HH PPS REVENUE CREDIT

## 2019-11-28 PROCEDURE — 3331090002 HH PPS REVENUE DEBIT

## 2019-11-29 ENCOUNTER — HOME CARE VISIT (OUTPATIENT)
Dept: SCHEDULING | Facility: HOME HEALTH | Age: 69
End: 2019-11-29
Payer: MEDICARE

## 2019-11-29 PROCEDURE — 3331090001 HH PPS REVENUE CREDIT

## 2019-11-29 PROCEDURE — 3331090003 HH PPS REVENUE ADJ

## 2019-11-29 PROCEDURE — G0495 RN CARE TRAIN/EDU IN HH: HCPCS

## 2019-11-29 PROCEDURE — 3331090002 HH PPS REVENUE DEBIT

## 2019-11-30 VITALS
DIASTOLIC BLOOD PRESSURE: 65 MMHG | OXYGEN SATURATION: 95 % | HEART RATE: 80 BPM | SYSTOLIC BLOOD PRESSURE: 112 MMHG | RESPIRATION RATE: 18 BRPM | TEMPERATURE: 96 F

## 2019-12-06 ENCOUNTER — HOSPITAL ENCOUNTER (EMERGENCY)
Age: 69
Discharge: HOME | End: 2019-12-06
Attending: EMERGENCY MEDICINE
Payer: MEDICARE

## 2019-12-06 ENCOUNTER — APPOINTMENT (OUTPATIENT)
Dept: CT IMAGING | Age: 69
End: 2019-12-06
Attending: EMERGENCY MEDICINE
Payer: MEDICARE

## 2019-12-06 VITALS
SYSTOLIC BLOOD PRESSURE: 119 MMHG | DIASTOLIC BLOOD PRESSURE: 75 MMHG | HEART RATE: 74 BPM | BODY MASS INDEX: 17.74 KG/M2 | WEIGHT: 88 LBS | TEMPERATURE: 99.1 F | OXYGEN SATURATION: 98 % | HEIGHT: 59 IN | RESPIRATION RATE: 20 BRPM

## 2019-12-06 DIAGNOSIS — K63.2 ABDOMINAL WALL FISTULA: Primary | ICD-10-CM

## 2019-12-06 LAB
ALBUMIN SERPL-MCNC: 2 G/DL (ref 3.4–5)
ALBUMIN/GLOB SERPL: 0.6 {RATIO} (ref 0.8–1.7)
ALP SERPL-CCNC: 144 U/L (ref 45–117)
ALT SERPL-CCNC: 10 U/L (ref 13–56)
ANION GAP SERPL CALC-SCNC: 8 MMOL/L (ref 3–18)
AST SERPL-CCNC: 10 U/L (ref 10–38)
BASOPHILS # BLD: 0 K/UL (ref 0–0.1)
BASOPHILS NFR BLD: 1 % (ref 0–2)
BILIRUB SERPL-MCNC: 0.2 MG/DL (ref 0.2–1)
BUN SERPL-MCNC: 13 MG/DL (ref 7–18)
BUN/CREAT SERPL: 26 (ref 12–20)
CALCIUM SERPL-MCNC: 8.6 MG/DL (ref 8.5–10.1)
CHLORIDE SERPL-SCNC: 108 MMOL/L (ref 100–111)
CO2 SERPL-SCNC: 23 MMOL/L (ref 21–32)
CREAT SERPL-MCNC: 0.5 MG/DL (ref 0.6–1.3)
DIFFERENTIAL METHOD BLD: ABNORMAL
EOSINOPHIL # BLD: 0.5 K/UL (ref 0–0.4)
EOSINOPHIL NFR BLD: 7 % (ref 0–5)
ERYTHROCYTE [DISTWIDTH] IN BLOOD BY AUTOMATED COUNT: 16.3 % (ref 11.6–14.5)
GLOBULIN SER CALC-MCNC: 3.6 G/DL (ref 2–4)
GLUCOSE SERPL-MCNC: 81 MG/DL (ref 74–99)
HCT VFR BLD AUTO: 30.7 % (ref 35–45)
HGB BLD-MCNC: 9.7 G/DL (ref 12–16)
LIPASE SERPL-CCNC: 33 U/L (ref 73–393)
LYMPHOCYTES # BLD: 1.9 K/UL (ref 0.9–3.6)
LYMPHOCYTES NFR BLD: 28 % (ref 21–52)
MCH RBC QN AUTO: 27.1 PG (ref 24–34)
MCHC RBC AUTO-ENTMCNC: 31.6 G/DL (ref 31–37)
MCV RBC AUTO: 85.8 FL (ref 74–97)
MONOCYTES # BLD: 0.7 K/UL (ref 0.05–1.2)
MONOCYTES NFR BLD: 10 % (ref 3–10)
NEUTS SEG # BLD: 3.8 K/UL (ref 1.8–8)
NEUTS SEG NFR BLD: 54 % (ref 40–73)
PLATELET # BLD AUTO: 453 K/UL (ref 135–420)
PMV BLD AUTO: 9 FL (ref 9.2–11.8)
POTASSIUM SERPL-SCNC: 3.8 MMOL/L (ref 3.5–5.5)
PROT SERPL-MCNC: 5.6 G/DL (ref 6.4–8.2)
RBC # BLD AUTO: 3.58 M/UL (ref 4.2–5.3)
SODIUM SERPL-SCNC: 139 MMOL/L (ref 136–145)
WBC # BLD AUTO: 6.9 K/UL (ref 4.6–13.2)

## 2019-12-06 PROCEDURE — 99285 EMERGENCY DEPT VISIT HI MDM: CPT

## 2019-12-06 PROCEDURE — 80053 COMPREHEN METABOLIC PANEL: CPT

## 2019-12-06 PROCEDURE — 74011636320 HC RX REV CODE- 636/320: Performed by: EMERGENCY MEDICINE

## 2019-12-06 PROCEDURE — 93005 ELECTROCARDIOGRAM TRACING: CPT

## 2019-12-06 PROCEDURE — 74011250636 HC RX REV CODE- 250/636: Performed by: EMERGENCY MEDICINE

## 2019-12-06 PROCEDURE — 83690 ASSAY OF LIPASE: CPT

## 2019-12-06 PROCEDURE — 74177 CT ABD & PELVIS W/CONTRAST: CPT

## 2019-12-06 PROCEDURE — 85025 COMPLETE CBC W/AUTO DIFF WBC: CPT

## 2019-12-06 RX ADMIN — SODIUM CHLORIDE 1000 ML: 900 INJECTION, SOLUTION INTRAVENOUS at 15:49

## 2019-12-06 RX ADMIN — IOPAMIDOL 100 ML: 612 INJECTION, SOLUTION INTRAVENOUS at 14:21

## 2019-12-06 RX ADMIN — IOHEXOL: 240 INJECTION, SOLUTION INTRATHECAL; INTRAVASCULAR; INTRAVENOUS; ORAL at 13:45

## 2019-12-06 NOTE — ED PROVIDER NOTES
EMERGENCY DEPARTMENT HISTORY AND PHYSICAL EXAM    Date: 12/6/2019  Patient Name: Jolene Pacheco    History of Presenting Illness     Chief Complaint   Patient presents with    Wound Check         History Provided By: Patient    12:32 PM  Jolene Pacheco is a 71 y.o. 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 She states normally she uses approximately 5-6 pads per day for fistula output and now she is having to put on multiple pads per hour. She states this increased output has been over the last 48 hours and she now is having decreased urine output. She denies any nausea, vomiting, bowel complaints. She states she does have pain around the fistula where the skin has become red and excoriated from the constant moisture. No other complaints. PCP: Shlomo DUNCAN NP    Current Facility-Administered Medications   Medication Dose Route Frequency Provider Last Rate Last Dose    sodium chloride 0.9 % bolus infusion 1,000 mL  1,000 mL IntraVENous ONCE Anest, Delaney Avendaño MD         Current Outpatient Medications   Medication Sig Dispense Refill    ondansetron (ZOFRAN ODT) 4 mg disintegrating tablet Take 1 Tab by mouth every eight (8) hours as needed for Nausea. 20 Tab 0    hydrOXYzine HCl (ATARAX) 25 mg tablet Take 1 Tab by mouth two (2) times daily as needed for Anxiety or Itching. 30 Tab 0    diphenhydrAMINE (BENADRYL ALLERGY) 25 mg tablet Take 1 Tab by mouth every six (6) hours as needed for Itching or Sleep. 20 Tab 0    temazepam (RESTORIL) 30 mg capsule Take 30 mg by mouth nightly as needed for Sleep.  umeclidinium (INCRUSE ELLIPTA) 62.5 mcg/actuation inhaler Take 1 Puff by inhalation daily.  cyanocobalamin 1,000 mcg tablet Take 1,000 mcg by mouth daily.       naloxone (NARCAN) 4 mg/actuation nasal spray 1 Hope by IntraNASal route once as needed for Other (opiod overdose).  montelukast (SINGULAIR) 10 mg tablet Take 4 mg by mouth daily.  multivitamin with iron tablet Take 1 Tab by mouth daily.  lipase-protease-amylase (CREON) 12,000-38,000 -60,000 unit capsule Take 1 Cap by mouth three (3) times daily (with meals). Indications: with snack      fluticasone propion-salmeterol (ADVAIR DISKUS) 500-50 mcg/dose diskus inhaler Take 1 Puff by inhalation every twelve (12) hours.  acetaminophen (TYLENOL EXTRA STRENGTH) 500 mg tablet Take 500 mg by mouth every four (4) hours as needed for Pain.  ropinirole HCl (REQUIP PO) Take 1 mg by mouth daily as needed (restless leg syndrome (RLS)).  levothyroxine (SYNTHROID) 112 mcg tablet Take 88 mcg by mouth Daily (before breakfast).  nystatin-triamcinolone (MYCOLOG II) topical cream Apply  to affected area three (3) times daily. 30 g 2    lipase-protease-amylase (CREON) 12,000-38,000 -60,000 unit capsule Take 2 Caps by mouth three (3) times daily (with meals).  loperamide (IMODIUM) 2 mg capsule Take 2 mg by mouth four (4) times daily as needed for Diarrhea.  buPROPion XL (WELLBUTRIN XL) 150 mg tablet Take 300 mg by mouth every morning.  DULoxetine (CYMBALTA) 20 mg capsule Take 60 mg by mouth daily.  albuterol-ipratropium (DUO-NEB) 2.5 mg-0.5 mg/3 ml nebu 3 mL by Nebulization route every four (4) hours as needed for Other (SOB or wheezing).  albuterol (PROVENTIL HFA, VENTOLIN HFA, PROAIR HFA) 90 mcg/actuation inhaler Take 1 Puff by inhalation every four (4) hours as needed for Wheezing.  1 Inhaler 1       Past History     Past Medical History:  Past Medical History:   Diagnosis Date    Asthma     Depression     Emphysema of lung ([address]     Gastroparesis     Hypothyroidism     Malabsorption     of fat    Pancreatic insufficiency     Restless leg     bilateral       Past Surgical History:  Past Surgical History:   Procedure Laterality Date    ABDOMEN SURGERY PROC UNLISTED  HX HERNIA REPAIR      HX SMALL BOWEL RESECTION      intra aortic baloon pump    HX TUBAL LIGATION         Family History:  History reviewed. No pertinent family history. Social History:  Social History     Tobacco Use    Smoking status: Former Smoker    Smokeless tobacco: Never Used   Substance Use Topics    Alcohol use: Never     Frequency: Never    Drug use: Not Currently       Allergies: Allergies   Allergen Reactions    Keflex [Cephalexin] Shortness of Breath, Rash and Itching    Zofran [Ondansetron Hcl] Shortness of Breath, Rash and Itching         Review of Systems   Review of Systems   Constitutional: Negative for fever. Respiratory: Negative for shortness of breath. Cardiovascular: Negative for chest pain. Gastrointestinal: Positive for abdominal pain. Negative for nausea and vomiting. Genitourinary: Positive for decreased urine volume. All other systems reviewed and are negative.         Physical Exam     Vitals:    12/06/19 1228 12/06/19 1345 12/06/19 1400 12/06/19 1445   BP: 140/86 113/87 116/71 119/75   Pulse: 74      Resp: 20      Temp: 99.1 °F (37.3 °C)      SpO2: 100% 100% 100% 98%   Weight: 39.9 kg (88 lb)      Height: 4' 11\" (1.499 m)        Physical Exam    Nursing notes and vital signs reviewed    Constitutional: Chronically ill appearing, very thin, mild distress  Head: Normocephalic, Atraumatic  Eyes: EOMI  Neck: Supple  Cardiovascular: Regular rate and rhythm, no murmurs, rubs, or gallops  Chest: Normal work of breathing and chest excursion bilaterally  Lungs: Clear to ausculation bilaterally  Abdomen: Soft, mildly tender around fistula, fistula is putting out yellow fluid, area around fistula is erythematous with skin breakdown, non distended, normoactive bowel sounds  Back: No evidence of trauma or deformity  Extremities: No evidence of trauma or deformity, no LE edema  Skin: Warm and dry, normal cap refill  Neuro: Alert and appropriate  Psychiatric: Normal mood and affect      Diagnostic Study Results     Labs -     Recent Results (from the past 12 hour(s))   EKG, 12 LEAD, INITIAL    Collection Time: 12/06/19 12:58 PM   Result Value Ref Range    Ventricular Rate 87 BPM    Atrial Rate 87 BPM    P-R Interval 150 ms    QRS Duration 66 ms    Q-T Interval 356 ms    QTC Calculation (Bezet) 428 ms    Calculated P Axis 65 degrees    Calculated R Axis 73 degrees    Calculated T Axis 60 degrees    Diagnosis       Normal sinus rhythm  Low voltage QRS  Borderline ECG  When compared with ECG of 11-NOV-2019 19:34,  Questionable change in QRS axis  Nonspecific T wave abnormality now evident in Anterior leads     CBC WITH AUTOMATED DIFF    Collection Time: 12/06/19 12:59 PM   Result Value Ref Range    WBC 6.9 4.6 - 13.2 K/uL    RBC 3.58 (L) 4.20 - 5.30 M/uL    HGB 9.7 (L) 12.0 - 16.0 g/dL    HCT 30.7 (L) 35.0 - 45.0 %    MCV 85.8 74.0 - 97.0 FL    MCH 27.1 24.0 - 34.0 PG    MCHC 31.6 31.0 - 37.0 g/dL    RDW 16.3 (H) 11.6 - 14.5 %    PLATELET 757 (H) 245 - 420 K/uL    MPV 9.0 (L) 9.2 - 11.8 FL    NEUTROPHILS 54 40 - 73 %    LYMPHOCYTES 28 21 - 52 %    MONOCYTES 10 3 - 10 %    EOSINOPHILS 7 (H) 0 - 5 %    BASOPHILS 1 0 - 2 %    ABS. NEUTROPHILS 3.8 1.8 - 8.0 K/UL    ABS. LYMPHOCYTES 1.9 0.9 - 3.6 K/UL    ABS. MONOCYTES 0.7 0.05 - 1.2 K/UL    ABS. EOSINOPHILS 0.5 (H) 0.0 - 0.4 K/UL    ABS.  BASOPHILS 0.0 0.0 - 0.1 K/UL    DF AUTOMATED     METABOLIC PANEL, COMPREHENSIVE    Collection Time: 12/06/19 12:59 PM   Result Value Ref Range    Sodium 139 136 - 145 mmol/L    Potassium 3.8 3.5 - 5.5 mmol/L    Chloride 108 100 - 111 mmol/L    CO2 23 21 - 32 mmol/L    Anion gap 8 3.0 - 18 mmol/L    Glucose 81 74 - 99 mg/dL    BUN 13 7.0 - 18 MG/DL    Creatinine 0.50 (L) 0.6 - 1.3 MG/DL    BUN/Creatinine ratio 26 (H) 12 - 20      GFR est AA >60 >60 ml/min/1.73m2    GFR est non-AA >60 >60 ml/min/1.73m2    Calcium 8.6 8.5 - 10.1 MG/DL    Bilirubin, total 0.2 0.2 - 1.0 MG/DL    ALT (SGPT) 10 (L) 13 - 56 U/L    AST (SGOT) 10 10 - 38 U/L    Alk. phosphatase 144 (H) 45 - 117 U/L    Protein, total 5.6 (L) 6.4 - 8.2 g/dL    Albumin 2.0 (L) 3.4 - 5.0 g/dL    Globulin 3.6 2.0 - 4.0 g/dL    A-G Ratio 0.6 (L) 0.8 - 1.7     LIPASE    Collection Time: 12/06/19 12:59 PM   Result Value Ref Range    Lipase 33 (L) 73 - 393 U/L       Radiologic Studies -   CT ABD PELV W CONT   Final Result   IMPRESSION:         1. Gastrojejunostomy with considerable thickening of proximal jejunum   immediately distal to the gastroenterostomy in the anterior epigastrium in close   proximity to the abdominal wall, suggesting inflammation. In addition, focal   extra luminal gas extends anteriorly towards the intra-abdominal wall from the   jejunum immediately distal to the anastomosis, with a maximal diameter 3 mm,   however, not remain gas filled/patent through to the skin, presumably the   fistula, which is collapsed at its distal aspect. 2. Stable cystic masses in the right pelvis, likely benign. 3. Interval resolution of previous right pyelonephritis and clearing of previous   right renal pelvic calculus. Residual 3 mm calculus is present in the lower pole   the right kidney. 4. Moderate pericardial effusion. 5. Interval resolution of previous dilatation of the extrahepatic biliary tree. CT Results  (Last 48 hours)               12/06/19 1428  CT ABD PELV W CONT Final result    Impression:  IMPRESSION:           1. Gastrojejunostomy with considerable thickening of proximal jejunum   immediately distal to the gastroenterostomy in the anterior epigastrium in close   proximity to the abdominal wall, suggesting inflammation. In addition, focal   extra luminal gas extends anteriorly towards the intra-abdominal wall from the   jejunum immediately distal to the anastomosis, with a maximal diameter 3 mm,   however, not remain gas filled/patent through to the skin, presumably the   fistula, which is collapsed at its distal aspect.    2. Stable cystic masses in the right pelvis, likely benign. 3. Interval resolution of previous right pyelonephritis and clearing of previous   right renal pelvic calculus. Residual 3 mm calculus is present in the lower pole   the right kidney. 4. Moderate pericardial effusion. 5. Interval resolution of previous dilatation of the extrahepatic biliary tree. Narrative:  EXAM: CT of the abdomen and pelvis       INDICATION: Abdominal pain. Upper abdominal fistula draining large amounts for a   few days. COMPARISON: 9/25/2019       TECHNIQUE: Helical volumetric scanning through the abdomen and pelvis was   performed after small amount of oral contrast, with nonionic intravenous   contrast.  Axial, coronal and sagittal reconstructions were created. One or more   dose reduction techniques were used on this CT: automated exposure control,   adjustment of the mAs and/or kVp according to patient size, and iterative   reconstruction techniques. The specific techniques used on this CT exam have   been documented in the patient's electronic medical record. Digital Imaging and   Communications in Medicine (DICOM) format image data are available to   nonaffiliated external healthcare facilities or entities on a secure, media   free, reciprocally searchable basis with patient authorization for at least a   12-month period after this study. _______________       FINDINGS:       LOWER CHEST: Moderate pericardial effusion is present, previously minimal.   Emphysematous again noted. No pericardial effusion. LIVER, BILIARY: Liver is normal. Previous dilatation of the extrahepatic biliary   tree has resolved. Gallbladder is unremarkable. PANCREAS: Normal.       SPLEEN: Normal.       ADRENALS: Normal.       KIDNEYS: Interval resolution of previous right renal pelvic calculus. Lower pole   3 mm calculus is present in the right kidney. No left renal calculi.  Renal   parenchyma appears normal.       LYMPH NODES: No enlarged lymph nodes. GASTROINTESTINAL TRACT: Evidence of partial gastrectomy and gastrojejunostomy is   again noted. Within the anterior epigastrium, proximal jejunum adjacent to the   gastroenterostomy demonstrates moderate wall thickening and is in close   proximity to the undersurface of the anterior abdominal wall in the midline. Tiny amount of extraluminal gas is present anterior to the proximal jejunum just   distal to the anastomosis, measuring maximally about 3 mm in greatest transverse   diameter, probably corresponding with the clinical fistula. Communication   through the skin is not demonstrated on the current exam, as it is probably   collapsed. The colon is normal in caliber without wall thickening. PELVIC ORGANS: In the lateral right pelvis, 1.9 x 1.7 cm smooth focal fluid   collection producing measured 2.2 x 1.6 cm in in the posterior right pelvis   further inferiorly, between the lower uterine segment and the rectum, centered   to the right of midline, an additional smooth fluid collection measures 3.7 x   1.5 cm (previously 4.2 x 2.3 cm). No significant pseudocapsule and no internal   gas. VASCULATURE: Moderate calcific atherosclerosis is present. No AAA. BONES: No acute or aggressive osseous abnormalities identified. OTHER: None.       _______________               CXR Results  (Last 48 hours)    None          Medications given in the ED-  Medications   sodium chloride 0.9 % bolus infusion 1,000 mL (has no administration in time range)   iohexol (OMNIPAQUE) ORAL mixture ( Oral Given 12/6/19 1345)   iopamidol (ISOVUE 300) 61 % contrast injection 100 mL (100 mL IntraVENous Given 12/6/19 1421)         Medical Decision Making   I am the first provider for this patient. I reviewed the vital signs, available nursing notes, past medical history, past surgical history, family history and social history.     Vital Signs-Reviewed the patient's vital signs.    Pulse Oximetry Analysis -100 % on room air    Cardiac Monitor:  Rate: 90 bpm  Rhythm: Normal sinus    EKG interpretation: (Preliminary)  EKG read by Dr. Sofia Reza at 1:10 PM  Normal sinus rhythm at a rate of 87 bpm, SC interval 150 ms, QRS duration of 66 ms, low voltage QRS    Records Reviewed: Nursing Notes, Old Medical Records and Previous electrocardiograms    Provider Notes (Medical Decision Making): Jolene Pacheco is a 71 y.o. female resenting with chronic abdominal wall fistula that has become higher in output recently. Patient is not clinically dehydrated and labs are benign. CT redemonstrates fistula but no other acute process. Discussed with surgeon. Plan for discharge with wound care referral and strict return precautions. Patient understands and agrees with this plan. Procedures:  Procedures    ED Course:   CONSULT NOTE:   3:16 PM  Dr. Sofia Reza spoke with Dr. Marii Faith   Specialty: General Surgery  Discussed pt's hx, disposition, and available diagnostic and imaging results over the telephone. Reviewed care plans. Recommends follow up with wound clinic regarding fistula skin care. 3:33 PM  Updated patient on all results and plan. All questions answered. Diagnosis and Disposition     Critical Care: None    DISCHARGE NOTE:    Shima Perrin  results have been reviewed with her. She has been counseled regarding her diagnosis, treatment, and plan. She verbally conveys understanding and agreement of the signs, symptoms, diagnosis, treatment and prognosis and additionally agrees to follow up as discussed. She also agrees with the care-plan and conveys that all of her questions have been answered. I have also provided discharge instructions for her that include: educational information regarding their diagnosis and treatment, and list of reasons why they would want to return to the ED prior to their follow-up appointment, should her condition change.  She has been provided with education for proper emergency department utilization. CLINICAL IMPRESSION:    1. Abdominal wall fistula        PLAN:  1. D/C Home  2. Current Discharge Medication List        3. Follow-up Information     Follow up With Specialties Details Why Contact Info Additional Information    Mercedes Farah NP Nurse Practitioner Schedule an appointment as soon as possible for a visit  [address] [facility]       THE [facility] OP WOUND CARE Wound Care Schedule an appointment as soon as possible for a visit  [address] [id number] [phone] Patients scheduled for OP Wound Care visits should enter the [facility], 2nd floor, [address] for check in. THE [facility] EMERGENCY DEPT Emergency Medicine  If symptoms worsen [address] [id number]  [phone]         _______________________________      Please note that this dictation was completed with [employer], the computer voice recognition software. Quite often unanticipated grammatical, syntax, homophones, and other interpretive errors are inadvertently transcribed by the computer software. Please disregard these errors. Please excuse any errors that have escaped final proofreading.

## 2019-12-06 NOTE — ED TRIAGE NOTES
Patient with abdominal fistula that has been draining large amounts of brown drainage for a few days. Patient reports abdominal pain.

## 2019-12-06 NOTE — DISCHARGE INSTRUCTIONS
Patient Education     Wound Care: After Your Visit  Your Care Instructions  Taking good care of your wound at home will help it heal quickly and reduce your chance of infection. The doctor has checked you carefully, but problems can develop later. If you notice any problems or new symptoms, get medical treatment right away. Follow-up care is a key part of your treatment and safety. Be sure to make and go to all appointments, and call your doctor if you are having problems. It's also a good idea to know your test results and keep a list of the medicines you take. How can you care for yourself at home? · Clean the area with soap and water 2 times a day unless your doctor gives you different instructions. Don't use hydrogen peroxide or alcohol, which can slow healing. ¨ You may cover the wound with a thin layer of antibiotic ointment, such as bacitracin, and a nonstick bandage. ¨ Apply more ointment and replace the bandage as needed. · Take pain medicines exactly as directed. Some pain is normal with a wound, but do not ignore pain that is getting worse instead of better. You could have an infection. ¨ If the doctor gave you a prescription medicine for pain, take it as prescribed. ¨ If you are not taking a prescription pain medicine, ask your doctor if you can take an over-the-counter medicine. · Your doctor may have closed your wound with stitches (sutures), staples, or skin glue. ¨ If you have stitches, your doctor may remove them after several days to 2 weeks. Or you may have stitches that dissolve on their own. ¨ If you have staples, your doctor may remove them after 7 to 10 days. ¨ If your wound was closed with skin glue, the glue will wear off in a few days to 2 weeks. When should you call for help? Call your doctor now or seek immediate medical care if:  · You have signs of infection, such as:  ¨ Increased pain, swelling, warmth, or redness near the wound.   ¨ Red streaks leading from the wound.  ¨ Pus draining from the wound. ¨ A fever. · You bleed so much from your incision that you soak one or more bandages over 2 to 4 hours. Watch closely for changes in your health, and be sure to contact your doctor if:  · The wound is not getting better each day. Where can you learn more? Go to [employer]  Enter M973 in the search box to learn more about \"Wound Care: After Your Visit. \"   © 4012-5210 [employer]. Care instructions adapted under license by [employer] (which disclaims liability or warranty for this information). This care instruction is for use with your licensed healthcare professional. If you have questions about a medical condition or this instruction, always ask your healthcare professional. [place] [address] any warranty or liability for your use of this information. Content Version: 37.8.242997;  Last Revised: April 23, 2012

## 2019-12-08 LAB
ATRIAL RATE: 87 BPM
CALCULATED P AXIS, ECG09: 65 DEGREES
CALCULATED R AXIS, ECG10: 73 DEGREES
CALCULATED T AXIS, ECG11: 60 DEGREES
DIAGNOSIS, 93000: NORMAL
P-R INTERVAL, ECG05: 150 MS
Q-T INTERVAL, ECG07: 356 MS
QRS DURATION, ECG06: 66 MS
QTC CALCULATION (BEZET), ECG08: 428 MS
VENTRICULAR RATE, ECG03: 87 BPM

## 2019-12-13 ENCOUNTER — HOSPITAL ENCOUNTER (EMERGENCY)
Age: 69
Discharge: HOME | End: 2019-12-13
Attending: EMERGENCY MEDICINE
Payer: MEDICARE

## 2019-12-13 VITALS
TEMPERATURE: 98.5 F | WEIGHT: 85.7 LBS | RESPIRATION RATE: 16 BRPM | OXYGEN SATURATION: 100 % | BODY MASS INDEX: 17.28 KG/M2 | DIASTOLIC BLOOD PRESSURE: 59 MMHG | HEART RATE: 89 BPM | HEIGHT: 59 IN | SYSTOLIC BLOOD PRESSURE: 112 MMHG

## 2019-12-13 DIAGNOSIS — K63.2 ABDOMINAL WALL FISTULA: ICD-10-CM

## 2019-12-13 DIAGNOSIS — R23.8 SKIN IRRITATION: Primary | ICD-10-CM

## 2019-12-13 LAB
ALBUMIN SERPL-MCNC: 2 G/DL (ref 3.4–5)
ALBUMIN/GLOB SERPL: 0.5 {RATIO} (ref 0.8–1.7)
ALP SERPL-CCNC: 190 U/L (ref 45–117)
ALT SERPL-CCNC: 9 U/L (ref 13–56)
ANION GAP SERPL CALC-SCNC: 7 MMOL/L (ref 3–18)
AST SERPL-CCNC: 13 U/L (ref 10–38)
BASOPHILS # BLD: 0 K/UL (ref 0–0.1)
BASOPHILS NFR BLD: 0 % (ref 0–2)
BILIRUB SERPL-MCNC: 0.2 MG/DL (ref 0.2–1)
BUN SERPL-MCNC: 23 MG/DL (ref 7–18)
BUN/CREAT SERPL: 38 (ref 12–20)
CALCIUM SERPL-MCNC: 8.4 MG/DL (ref 8.5–10.1)
CHLORIDE SERPL-SCNC: 108 MMOL/L (ref 100–111)
CO2 SERPL-SCNC: 25 MMOL/L (ref 21–32)
CREAT SERPL-MCNC: 0.61 MG/DL (ref 0.6–1.3)
DIFFERENTIAL METHOD BLD: ABNORMAL
EOSINOPHIL # BLD: 0.4 K/UL (ref 0–0.4)
EOSINOPHIL NFR BLD: 3 % (ref 0–5)
ERYTHROCYTE [DISTWIDTH] IN BLOOD BY AUTOMATED COUNT: 16.7 % (ref 11.6–14.5)
GLOBULIN SER CALC-MCNC: 4 G/DL (ref 2–4)
GLUCOSE SERPL-MCNC: 96 MG/DL (ref 74–99)
HCT VFR BLD AUTO: 33 % (ref 35–45)
HGB BLD-MCNC: 10.3 G/DL (ref 12–16)
LYMPHOCYTES # BLD: 2.3 K/UL (ref 0.9–3.6)
LYMPHOCYTES NFR BLD: 18 % (ref 21–52)
MCH RBC QN AUTO: 26.6 PG (ref 24–34)
MCHC RBC AUTO-ENTMCNC: 31.2 G/DL (ref 31–37)
MCV RBC AUTO: 85.3 FL (ref 74–97)
MONOCYTES # BLD: 1.2 K/UL (ref 0.05–1.2)
MONOCYTES NFR BLD: 10 % (ref 3–10)
NEUTS SEG # BLD: 8.4 K/UL (ref 1.8–8)
NEUTS SEG NFR BLD: 69 % (ref 40–73)
PLATELET # BLD AUTO: 443 K/UL (ref 135–420)
PMV BLD AUTO: 8.8 FL (ref 9.2–11.8)
POTASSIUM SERPL-SCNC: 3.8 MMOL/L (ref 3.5–5.5)
PROT SERPL-MCNC: 6 G/DL (ref 6.4–8.2)
RBC # BLD AUTO: 3.87 M/UL (ref 4.2–5.3)
SODIUM SERPL-SCNC: 140 MMOL/L (ref 136–145)
WBC # BLD AUTO: 12.3 K/UL (ref 4.6–13.2)

## 2019-12-13 PROCEDURE — 85025 COMPLETE CBC W/AUTO DIFF WBC: CPT

## 2019-12-13 PROCEDURE — 80053 COMPREHEN METABOLIC PANEL: CPT

## 2019-12-13 PROCEDURE — 99284 EMERGENCY DEPT VISIT MOD MDM: CPT

## 2019-12-14 NOTE — ED NOTES
Patient refused wound care. PT states, \"I have all that stuff at home and it doesn't work. \"  notified and spoke with patient about options. Pt continues to decline wound care in ER. Dr. Arcelia Muñoz has given referral to wound care and case management for follow up.

## 2019-12-14 NOTE — ED PROVIDER NOTES
EMERGENCY DEPARTMENT HISTORY AND PHYSICAL EXAM    Date: 12/13/2019  Patient Name: Hao Payton    History of Presenting Illness     Chief Complaint   Patient presents with    Skin Problem         History Provided By: Patient      Hao Payton is a 71 y.o. female with PMHX of asthma, gastroparesis, small bowel resection by complicated by abdominal wall fistula who presents to the emergency department C/O pain around fistula site. Patient states she has been having increased drainage the past couple days and the skin around her fistula site is raw and irritable. Says she tries to keep the site clean and dry. She has been doing wound care at home. She previously had wound care visit patient. She is otherwise been in state of her normal health. PCP: Arvind DUNCAN NP    Current Outpatient Medications   Medication Sig Dispense Refill    ondansetron (ZOFRAN ODT) 4 mg disintegrating tablet Take 1 Tab by mouth every eight (8) hours as needed for Nausea. 20 Tab 0    hydrOXYzine HCl (ATARAX) 25 mg tablet Take 1 Tab by mouth two (2) times daily as needed for Anxiety or Itching. 30 Tab 0    diphenhydrAMINE (BENADRYL ALLERGY) 25 mg tablet Take 1 Tab by mouth every six (6) hours as needed for Itching or Sleep. 20 Tab 0    temazepam (RESTORIL) 30 mg capsule Take 30 mg by mouth nightly as needed for Sleep.  umeclidinium (INCRUSE ELLIPTA) 62.5 mcg/actuation inhaler Take 1 Puff by inhalation daily.  cyanocobalamin 1,000 mcg tablet Take 1,000 mcg by mouth daily.  naloxone (NARCAN) 4 mg/actuation nasal spray 1 [place] by IntraNASal route once as needed for Other (opiod overdose).  montelukast (SINGULAIR) 10 mg tablet Take 4 mg by mouth daily.  multivitamin with iron tablet Take 1 Tab by mouth daily.  lipase-protease-amylase (CREON) 12,000-38,000 -60,000 unit capsule Take 1 Cap by mouth three (3) times daily (with meals).  Indications: with snack      fluticasone propion-salmeterol (ADVAIR DISKUS) 500-50 mcg/dose diskus inhaler Take 1 Puff by inhalation every twelve (12) hours.  acetaminophen (TYLENOL EXTRA STRENGTH) 500 mg tablet Take 500 mg by mouth every four (4) hours as needed for Pain.  ropinirole HCl (REQUIP PO) Take 1 mg by mouth daily as needed (restless leg syndrome (RLS)).  levothyroxine (SYNTHROID) 112 mcg tablet Take 88 mcg by mouth Daily (before breakfast).  nystatin-triamcinolone (MYCOLOG II) topical cream Apply  to affected area three (3) times daily. 30 g 2    lipase-protease-amylase (CREON) 12,000-38,000 -60,000 unit capsule Take 2 Caps by mouth three (3) times daily (with meals).  loperamide (IMODIUM) 2 mg capsule Take 2 mg by mouth four (4) times daily as needed for Diarrhea.  buPROPion XL (WELLBUTRIN XL) 150 mg tablet Take 300 mg by mouth every morning.  DULoxetine (CYMBALTA) 20 mg capsule Take 60 mg by mouth daily.  albuterol-ipratropium (DUO-NEB) 2.5 mg-0.5 mg/3 ml nebu 3 mL by Nebulization route every four (4) hours as needed for Other (SOB or wheezing).  albuterol (PROVENTIL HFA, VENTOLIN HFA, PROAIR HFA) 90 mcg/actuation inhaler Take 1 Puff by inhalation every four (4) hours as needed for Wheezing. 1 Inhaler 1       Past History     Past Medical History:  Past Medical History:   Diagnosis Date    Asthma     Depression     Emphysema of lung (HCC)     Gastroparesis     Hypothyroidism     Malabsorption     of fat    Pancreatic insufficiency     Restless leg     bilateral       Past Surgical History:  Past Surgical History:   Procedure Laterality Date    ABDOMEN SURGERY PROC UNLISTED      HX HERNIA REPAIR      HX SMALL BOWEL RESECTION      intra aortic baloon pump    HX TUBAL LIGATION         Family History:  History reviewed. No pertinent family history.     Social History:  Social History     Tobacco Use    Smoking status: Former Smoker    Smokeless tobacco: Never Used   Substance Use Topics    Alcohol use: Never     Frequency: Never    Drug use: Not Currently       Allergies: Allergies   Allergen Reactions    Keflex [Cephalexin] Shortness of Breath, Rash and Itching    Zofran [Ondansetron Hcl] Shortness of Breath, Rash and Itching         Review of Systems   Review of Systems   Constitutional: Negative for chills and fever. Respiratory: Negative for shortness of breath. Cardiovascular: Negative for chest pain. Gastrointestinal: Negative for abdominal pain, diarrhea, nausea and vomiting. Skin: Positive for wound. All other systems reviewed and are negative.         Physical Exam     Vitals:    12/13/19 2015   BP: 110/60   Pulse: 91   Resp: 16   Temp: 98.5 °F (36.9 °C)   SpO2: 99%   Weight: 38.9 kg (85 lb 11.2 oz)   Height: 4' 11\" (1.499 m)     Physical Exam    Nursing notes and vital signs reviewed    Constitutional: Elderly and frail female  Head: Normocephalic, Atraumatic  Eyes: Pupils are equal, round, and reactive to light, EOMI  Neck: Supple  Cardiovascular: Regular rate and rhythm, no murmurs, rubs, or gallops  Chest: Normal work of breathing and chest excursion bilaterally  Lungs: Clear to ausculation bilaterally  Abdomen: Soft, non tender, non distended, normoactive bowel sounds, she has a small midline stoma with green-vida discharge and beefy irritable red skin surrounding it  Back: No evidence of trauma or deformity  Extremities: No evidence of trauma or deformity, no LE edema  Skin: Warm and dry, normal cap refill  Neuro: Alert and appropriate, CN intact, normal speech, strength and sensation full and symmetric bilaterally, normal gait, normal coordination  Psychiatric: Normal mood and affect      Diagnostic Study Results     Labs -     Recent Results (from the past 12 hour(s))   CBC WITH AUTOMATED DIFF    Collection Time: 12/13/19  8:39 PM   Result Value Ref Range    WBC 12.3 4.6 - 13.2 K/uL    RBC 3.87 (L) 4.20 - 5.30 M/uL    HGB 10.3 (L) 12.0 - 16.0 g/dL    HCT 33.0 (L) 35.0 - 45.0 %    MCV 85.3 74.0 - 97.0 FL    MCH 26.6 24.0 - 34.0 PG    MCHC 31.2 31.0 - 37.0 g/dL    RDW 16.7 (H) 11.6 - 14.5 %    PLATELET 379 (H) 251 - 420 K/uL    MPV 8.8 (L) 9.2 - 11.8 FL    NEUTROPHILS 69 40 - 73 %    LYMPHOCYTES 18 (L) 21 - 52 %    MONOCYTES 10 3 - 10 %    EOSINOPHILS 3 0 - 5 %    BASOPHILS 0 0 - 2 %    ABS. NEUTROPHILS 8.4 (H) 1.8 - 8.0 K/UL    ABS. LYMPHOCYTES 2.3 0.9 - 3.6 K/UL    ABS. MONOCYTES 1.2 0.05 - 1.2 K/UL    ABS. EOSINOPHILS 0.4 0.0 - 0.4 K/UL    ABS. BASOPHILS 0.0 0.0 - 0.1 K/UL    DF AUTOMATED     METABOLIC PANEL, COMPREHENSIVE    Collection Time: 12/13/19  8:39 PM   Result Value Ref Range    Sodium 140 136 - 145 mmol/L    Potassium 3.8 3.5 - 5.5 mmol/L    Chloride 108 100 - 111 mmol/L    CO2 25 21 - 32 mmol/L    Anion gap 7 3.0 - 18 mmol/L    Glucose 96 74 - 99 mg/dL    BUN 23 (H) 7.0 - 18 MG/DL    Creatinine 0.61 0.6 - 1.3 MG/DL    BUN/Creatinine ratio 38 (H) 12 - 20      GFR est AA >60 >60 ml/min/1.73m2    GFR est non-AA >60 >60 ml/min/1.73m2    Calcium 8.4 (L) 8.5 - 10.1 MG/DL    Bilirubin, total 0.2 0.2 - 1.0 MG/DL    ALT (SGPT) 9 (L) 13 - 56 U/L    AST (SGOT) 13 10 - 38 U/L    Alk. phosphatase 190 (H) 45 - 117 U/L    Protein, total 6.0 (L) 6.4 - 8.2 g/dL    Albumin 2.0 (L) 3.4 - 5.0 g/dL    Globulin 4.0 2.0 - 4.0 g/dL    A-G Ratio 0.5 (L) 0.8 - 1.7         Radiologic Studies -   No orders to display     CT Results  (Last 48 hours)    None        CXR Results  (Last 48 hours)    None          Medications given in the ED-  Medications - No data to display      Medical Decision Making   I am the first provider for this patient. I reviewed the vital signs, available nursing notes, past medical history, past surgical history, family history and social history. Vital Signs-Reviewed the patient's vital signs.     Pulse Oximetry Analysis - 99% on ra       Records Reviewed: Nursing Notes, Old Medical Records, Previous Radiology Studies and Previous Laboratory Studies    Provider Notes (Medical Decision Making): Reshma Fox is a 71 y.o. female with chronic abdominal fistula who is here for skin pain. Per patient she has seen surgeon as an outpatient and was told that surgery might not correct the problem or could cause more fistulas so she has not sought surgical repair. She was seen emergency department with similar complaint 6 days ago. She had a CT abdomen pelvis that demonstrated the fistula without any sort of acute intra-abdominal complications. The ER tonight her labs are within normal limits demonstrating a increase of white blood cell count from 6.9-12.3. And not concerned for intra-abdominal infection or systemic infection rather think this increase in white blood cell counts due to inflammation irritation of her skin. Her exam is consistent with dermatitis from drainage - she does not have cellulitis. I offered patient wound care in the emergency department topical sealant and small ostomy bag to collect drainage so does not simply end up on her skin. She states this has not worked and does not wish us to place it on in emergency department. I referred patient to outpatient wound care and stated that I had put in a case management consult for wound care at home. Patient says they have come to her house in the past and none of their strategies have adequately helped her maintain the wound. I discussed with the patient that emergency department with limited options and as it was a Friday night wound care is not here currently. I directed her that she would get her best care by following up at the wound care clinic which patient does not seem to think will help her much. She requested contact information for surgeon for a second opinion regarding outpatient elective surgical repair of fistula which I will provide for her.   I asked patient if there is anything that I could do for her in the emergency department that has worked in the past but patient says nothing has worked in the past for her skin irritation and discomfort. Procedures:  Procedures    ED Course:       Diagnosis and Disposition     Critical Care:     DISCHARGE NOTE:    Jenni Craft  results have been reviewed with her. She has been counseled regarding her diagnosis, treatment, and plan. She verbally conveys understanding and agreement of the signs, symptoms, diagnosis, treatment and prognosis and additionally agrees to follow up as discussed. She also agrees with the care-plan and conveys that all of her questions have been answered. I have also provided discharge instructions for her that include: educational information regarding their diagnosis and treatment, and list of reasons why they would want to return to the ED prior to their follow-up appointment, should her condition change. She has been provided with education for proper emergency department utilization. CLINICAL IMPRESSION:    1. Skin irritation    2. Abdominal wall fistula        PLAN:  1. D/C Home  2. Current Discharge Medication List        3. Follow-up Information     Follow up With Specialties Details Why Contact Info    Nathan Sapp NP Nurse Practitioner Schedule an appointment as soon as possible for a visit   [facility]      [place],  Colon and Rectal Surgery Schedule an appointment as soon as possible for a visit   [address] Schedule an appointment as soon as possible for a visit   [address] [phone]        _______________________________      Please note that this dictation was completed with [employer], the computer voice recognition software. Quite often unanticipated grammatical, syntax, homophones, and other interpretive errors are inadvertently transcribed by the computer software. Please disregard these errors.   Please excuse any errors that have escaped final proofreading.

## 2019-12-14 NOTE — ED TRIAGE NOTES
Patient brought to ED by EMS c/c abdominal fistula draining more than usual, and skin around fistula is burning per patient. Pt reports fistula present for 1.5 years.

## 2023-03-23 NOTE — ANESTHESIA POSTPROCEDURE EVALUATION
Post-Anesthesia Evaluation and Assessment    Cardiovascular Function/Vital Signs  Visit Vitals  /66   Pulse (!) 101   Temp 37.5 °C (99.5 °F)   Resp 26   Wt 43.1 kg (95 lb)   SpO2 100%   BMI 18.55 kg/m²       Patient is status post Procedure(s):  CYSTOSCOPY WITH RIGHT RETROGRADE PYELOGRAM, RIGHT URETERAL STENT PLACEMENT WITH C-ARM, PT IS IN ROOM 333. Nausea/Vomiting: Controlled. Postoperative hydration reviewed and adequate. Pain:  Pain Scale 1: Numeric (0 - 10) (09/26/19 0727)  Pain Intensity 1: 0 (09/26/19 0727)   Managed. Neurological Status:   Neuro (WDL): Within Defined Limits (09/26/19 0727)   At baseline. Mental Status and Level of Consciousness: Baseline and appropriate for discharge. Pulmonary Status:   O2 Device: Nasal cannula (09/26/19 0715)   Adequate oxygenation and airway patent. Complications related to anesthesia: None    Post-anesthesia assessment completed. No concerns. Patient has met all discharge requirements.     Signed By: Pebbles Obregon MD    September 26, 2019 denies Ketoconazole Pregnancy And Lactation Text: This medication is Pregnancy Category C and it isn't know if it is safe during pregnancy. It is also excreted in breast milk and breast feeding isn't recommended.

## 2023-07-20 NOTE — ANESTHESIA POSTPROCEDURE EVALUATION
Follow up with PCP in 48-72 hours; return to ED if any new or worsening of symptoms occur. Continue Macrobid regimen as prescribed   Post-Anesthesia Evaluation and Assessment    Cardiovascular Function/Vital Signs  Visit Vitals  /81   Pulse 98   Temp 36.8 °C (98.2 °F)   Resp 19   Ht 4' 11\" (1.499 m)   Wt 41.3 kg (91 lb)   SpO2 98%   BMI 18.38 kg/m²       Patient is status post Procedure(s):  CYSTOSCOPY, RIGHT RETROGRADE PYELOGRAM WITH INTERPRETATION, RIGHT URETEROSCOPY LASER LITHOTRIPSY WITH HOLMIUM, STENT CHANGE WITH C-ARM, \"SPEC POP\". Nausea/Vomiting: Controlled. Postoperative hydration reviewed and adequate. Pain:  Pain Scale 1: Numeric (0 - 10) (10/08/19 1605)  Pain Intensity 1: 0 (10/08/19 1605)   Managed. Neurological Status:   Neuro (WDL): Within Defined Limits (10/08/19 1550)   At baseline. Mental Status and Level of Consciousness: Baseline and appropriate for discharge. Pulmonary Status:   O2 Device: Room air (10/08/19 1546)   Adequate oxygenation and airway patent. Complications related to anesthesia: None    Post-anesthesia assessment completed. No concerns. Patient has met all discharge requirements.     Signed By: Mayra Wilks MD    October 8, 2019

## 2024-03-21 NOTE — ED NOTES
Continuity of Care Form    Patient Name: Jareth Paulino   :  1945  MRN:  [id number]    Admit date:  3/19/2024  Discharge date:  3/22/24    Code Status Order: [id number]   Advance Directives:     Admitting Physician:  Rashel Ha MD  PCP: Zuleika Arreola APRN - CNP    Discharging Nurse: Sherrie  Discharging Hospital Unit/Room#: 326/326-01  Discharging Unit Phone Number: [phone]    Emergency Contact:   Extended Emergency Contact Information  Primary Emergency Contact: Nano Paulino  Home Phone: [phone]  Relation: Child  Secondary Emergency Contact: Xin Wayne  Home Phone: [phone]  Relation: Child  Preferred language: English    Past Surgical History:  Past Surgical History:   Procedure Laterality Date    ATRIAL ABLATION SURGERY      JOINT REPLACEMENT Left     TONSILLECTOMY         Immunization History:   Immunization History   Administered Date(s) Administered    COVID-19, MODERNA BLUE border, Primary or Immunocompromised, (age 12y+), IM, 100 mcg/0.5mL 2021, 2021, 2022    COVID-19, MODERNA, ( formula), (age 12y+), IM, 50mcg/0.5mL 2024    Influenza, FLUAD, (age 65 y+), Adjuvanted, 0.5mL 2023    Pneumococcal, PCV20, PREVNAR 20, (age 6w+), IM, 0.5mL 2023    Pneumococcal, PPSV23, PNEUMOVAX 23, (age 2y+), SC/IM, 0.5mL 2022       Active Problems:  Patient Active Problem List   Diagnosis Code    Hyperlipidemia E78.5    Essential hypertension I10    Alcohol abuse F10.10    Depression F32.A    PAF (paroxysmal atrial fibrillation) ([facility]) I48.0    Status post catheter ablation of atrial fibrillation Z98.890    Long term current use of antiarrhythmic drug Z79.899    Long term (current) use of anticoagulants Z79.01    Macrocytic anemia D53.9    Chronic systolic congestive heart failure, NYHA class 3 ([facility]) I50.22    Atelectasis, left J98.11    Chronic diastolic congestive heart failure ([facility]) I50.32    TIERNEY (acute kidney injury) (HCC) N17.9    Leukocytosis  POC lactic 2.85. Provider aware.

## (undated) DEVICE — STRAP,POSITIONING,KNEE/BODY,FOAM,4X60": Brand: MEDLINE

## (undated) DEVICE — CATHETER URET L70CM OD6FR OPN END FLEXIMA

## (undated) DEVICE — MEDI-VAC NON-CONDUCTIVE SUCTION TUBING: Brand: CARDINAL HEALTH

## (undated) DEVICE — GARMENT,MEDLINE,DVT,INT,CALF,MED, GEN2: Brand: MEDLINE

## (undated) DEVICE — NITINOL STONE RETRIEVAL BASKET: Brand: ZERO TIP

## (undated) DEVICE — GDWIRE 3CM FLX-TIP 0.038X150CM -- BX/5 SENSOR

## (undated) DEVICE — SOLUTION IRRIG 3000ML 0.9% SOD CHL FLX CONT 0797208] ICU MEDICAL INC]

## (undated) DEVICE — CYSTO PACK: Brand: MEDLINE INDUSTRIES, INC.

## (undated) DEVICE — URETERAL ACCESS SHEATH SET: Brand: NAVIGATOR HD

## (undated) DEVICE — DRAPE XR C ARM 41X74IN LF --

## (undated) DEVICE — TRAP MUCUS SPECIMEN 40ML -- MEDICHOICE

## (undated) DEVICE — SEAL ENDOSCP INSTR 7FR BX SELF SEAL

## (undated) DEVICE — DUAL LUMEN URETERAL CATHETER

## (undated) DEVICE — STERILE POLYISOPRENE POWDER-FREE SURGICAL GLOVES: Brand: PROTEXIS